# Patient Record
Sex: MALE | Race: WHITE | Employment: OTHER | URBAN - METROPOLITAN AREA
[De-identification: names, ages, dates, MRNs, and addresses within clinical notes are randomized per-mention and may not be internally consistent; named-entity substitution may affect disease eponyms.]

---

## 2020-06-16 ENCOUNTER — HOSPITAL ENCOUNTER (OUTPATIENT)
Age: 70
Discharge: HOME | End: 2020-06-18
Payer: COMMERCIAL

## 2020-06-16 PROCEDURE — U0003 INFECTIOUS AGENT DETECTION BY NUCLEIC ACID (DNA OR RNA); SEVERE ACUTE RESPIRATORY SYNDROME CORONAVIRUS 2 (SARS-COV-2) (CORONAVIRUS DISEASE [COVID-19]), AMPLIFIED PROBE TECHNIQUE, MAKING USE OF HIGH THROUGHPUT TECHNOLOGIES AS DESCRIBED BY CMS-2020-01-R: HCPCS

## 2020-06-18 LAB
SARS-COV-2: NOT DETECTED
SOURCE: NORMAL

## 2020-06-22 ENCOUNTER — ANESTHESIA EVENT (OUTPATIENT)
Dept: OPERATING ROOM | Age: 70
End: 2020-06-22
Payer: MEDICARE

## 2020-06-22 NOTE — ANESTHESIA PRE PROCEDURE
Department of Anesthesiology  Preprocedure Note       Name:  Juan Daniel Saini   Age:  69 y.o.  :  1950                                          MRN:  [id number]         Date:  2020      Surgeon: Terrance Stout):  Modesto Sandoval MD    Procedure: Procedure(s):  RIGHT EYE CATARACT EMULSIFICATION IOL IMPLANT    Medications prior to admission:   Prior to Admission medications    Medication Sig Start Date End Date Taking? Authorizing Provider   losartan (COZAAR) 50 MG tablet Take 50 mg by mouth daily 2PM    Historical Provider, MD       Current medications:    No current facility-administered medications for this encounter. Current Outpatient Medications   Medication Sig Dispense Refill    losartan (COZAAR) 50 MG tablet Take 50 mg by mouth daily 2PM         Allergies:  No Known Allergies    Problem List:  There is no problem list on file for this patient. Past Medical History:        Diagnosis Date    Chronic back pain     swarnoma     Hx of blood clots     after gastric bypass surgery    Hypertension        Past Surgical History:        Procedure Laterality Date    BACK SURGERY      spine-herniated disc    CHOLECYSTECTOMY      COLONOSCOPY      GASTRIC BYPASS SURGERY      HERNIA REPAIR      TONSILLECTOMY      VENA CAVA FILTER PLACEMENT         Social History:    Social History     Tobacco Use    Smoking status: Never Smoker    Smokeless tobacco: Never Used   Substance Use Topics    Alcohol use:  Yes     Alcohol/week: 1.0 standard drinks     Types: 1 Glasses of wine per week     Comment: 1 glass of wine daily                                Counseling given: Not Answered      Vital Signs (Current):   Vitals:    20 0902   Weight: 300 lb (136.1 kg)   Height: 6' 2\" (1.88 m)                                              BP Readings from Last 3 Encounters:   17 (!) 174/96   16 (!) 198/98   16 (!) 177/96       NPO Status:  >8.H

## 2020-06-23 ENCOUNTER — HOSPITAL ENCOUNTER (OUTPATIENT)
Age: 70
Setting detail: OUTPATIENT SURGERY
Discharge: HOME | End: 2020-06-23
Attending: OPHTHALMOLOGY | Admitting: OPHTHALMOLOGY
Payer: MEDICARE

## 2020-06-23 ENCOUNTER — ANESTHESIA (OUTPATIENT)
Dept: OPERATING ROOM | Age: 70
End: 2020-06-23
Payer: MEDICARE

## 2020-06-23 VITALS
HEART RATE: 72 BPM | TEMPERATURE: 97.7 F | HEIGHT: 74 IN | RESPIRATION RATE: 18 BRPM | WEIGHT: 315 LBS | BODY MASS INDEX: 40.43 KG/M2 | DIASTOLIC BLOOD PRESSURE: 81 MMHG | OXYGEN SATURATION: 96 % | SYSTOLIC BLOOD PRESSURE: 152 MMHG

## 2020-06-23 VITALS
TEMPERATURE: 98.6 F | SYSTOLIC BLOOD PRESSURE: 192 MMHG | DIASTOLIC BLOOD PRESSURE: 100 MMHG | OXYGEN SATURATION: 100 % | RESPIRATION RATE: 11 BRPM

## 2020-06-23 PROBLEM — H26.9 RIGHT CATARACT: Status: ACTIVE | Noted: 2020-06-23

## 2020-06-23 PROCEDURE — V2632 POST CHMBR INTRAOCULAR LENS: HCPCS | Performed by: OPHTHALMOLOGY

## 2020-06-23 PROCEDURE — 6370000000 HC RX 637 (ALT 250 FOR IP): Performed by: OPHTHALMOLOGY

## 2020-06-23 PROCEDURE — 2580000003 HC RX 258: Performed by: ANESTHESIOLOGY

## 2020-06-23 PROCEDURE — 2709999900 HC NON-CHARGEABLE SUPPLY: Performed by: OPHTHALMOLOGY

## 2020-06-23 PROCEDURE — 2500000003 HC RX 250 WO HCPCS: Performed by: NURSE ANESTHETIST, CERTIFIED REGISTERED

## 2020-06-23 PROCEDURE — 7100000010 HC PHASE II RECOVERY - FIRST 15 MIN: Performed by: OPHTHALMOLOGY

## 2020-06-23 PROCEDURE — 3700000001 HC ADD 15 MINUTES (ANESTHESIA): Performed by: OPHTHALMOLOGY

## 2020-06-23 PROCEDURE — 3600000013 HC SURGERY LEVEL 3 ADDTL 15MIN: Performed by: OPHTHALMOLOGY

## 2020-06-23 PROCEDURE — 2500000003 HC RX 250 WO HCPCS: Performed by: OPHTHALMOLOGY

## 2020-06-23 PROCEDURE — 3600000003 HC SURGERY LEVEL 3 BASE: Performed by: OPHTHALMOLOGY

## 2020-06-23 PROCEDURE — 7100000011 HC PHASE II RECOVERY - ADDTL 15 MIN: Performed by: OPHTHALMOLOGY

## 2020-06-23 PROCEDURE — 3700000000 HC ANESTHESIA ATTENDED CARE: Performed by: OPHTHALMOLOGY

## 2020-06-23 PROCEDURE — 6360000002 HC RX W HCPCS: Performed by: NURSE ANESTHETIST, CERTIFIED REGISTERED

## 2020-06-23 DEVICE — LENS INTOCU +17.5 DIOPT A CONSTANT 118.8 L13MM DIA6MM 0DEG: Type: IMPLANTABLE DEVICE | Site: EYE | Status: FUNCTIONAL

## 2020-06-23 RX ORDER — LABETALOL HYDROCHLORIDE 5 MG/ML
INJECTION, SOLUTION INTRAVENOUS PRN
Status: DISCONTINUED | OUTPATIENT
Start: 2020-06-23 | End: 2020-06-23 | Stop reason: SDUPTHER

## 2020-06-23 RX ORDER — TETRACAINE HYDROCHLORIDE 5 MG/ML
1 SOLUTION OPHTHALMIC ONCE
Status: COMPLETED | OUTPATIENT
Start: 2020-06-23 | End: 2020-06-23

## 2020-06-23 RX ORDER — HYDROCODONE BITARTRATE AND ACETAMINOPHEN 5; 325 MG/1; MG/1
1 TABLET ORAL PRN
Status: DISCONTINUED | OUTPATIENT
Start: 2020-06-23 | End: 2020-06-23 | Stop reason: HOSPADM

## 2020-06-23 RX ORDER — FENTANYL CITRATE 50 UG/ML
INJECTION, SOLUTION INTRAMUSCULAR; INTRAVENOUS PRN
Status: DISCONTINUED | OUTPATIENT
Start: 2020-06-23 | End: 2020-06-23 | Stop reason: SDUPTHER

## 2020-06-23 RX ORDER — TETRACAINE HYDROCHLORIDE 5 MG/ML
SOLUTION OPHTHALMIC PRN
Status: DISCONTINUED | OUTPATIENT
Start: 2020-06-23 | End: 2020-06-23 | Stop reason: ALTCHOICE

## 2020-06-23 RX ORDER — LABETALOL HYDROCHLORIDE 5 MG/ML
INJECTION, SOLUTION INTRAVENOUS PRN
Status: DISCONTINUED | OUTPATIENT
Start: 2020-06-23 | End: 2020-06-23

## 2020-06-23 RX ORDER — FENTANYL CITRATE 50 UG/ML
50 INJECTION, SOLUTION INTRAMUSCULAR; INTRAVENOUS EVERY 5 MIN PRN
Status: DISCONTINUED | OUTPATIENT
Start: 2020-06-23 | End: 2020-06-23 | Stop reason: HOSPADM

## 2020-06-23 RX ORDER — BALANCED SALT SOLUTION 6.4; .75; .48; .3; 3.9; 1.7 MG/ML; MG/ML; MG/ML; MG/ML; MG/ML; MG/ML
SOLUTION OPHTHALMIC PRN
Status: DISCONTINUED | OUTPATIENT
Start: 2020-06-23 | End: 2020-06-23 | Stop reason: ALTCHOICE

## 2020-06-23 RX ORDER — HYDRALAZINE HYDROCHLORIDE 20 MG/ML
5 INJECTION INTRAMUSCULAR; INTRAVENOUS EVERY 10 MIN PRN
Status: DISCONTINUED | OUTPATIENT
Start: 2020-06-23 | End: 2020-06-23 | Stop reason: HOSPADM

## 2020-06-23 RX ORDER — PHENYLEPHRINE HCL 2.5 %
1 DROPS OPHTHALMIC (EYE)
Status: COMPLETED | OUTPATIENT
Start: 2020-06-23 | End: 2020-06-23

## 2020-06-23 RX ORDER — PHENYLEPHRINE HYDROCHLORIDE 100 MG/ML
1 SOLUTION/ DROPS OPHTHALMIC PRN
Status: DISCONTINUED | OUTPATIENT
Start: 2020-06-23 | End: 2020-06-23 | Stop reason: HOSPADM

## 2020-06-23 RX ORDER — CYCLOPENTOLATE HYDROCHLORIDE 10 MG/ML
1 SOLUTION/ DROPS OPHTHALMIC
Status: COMPLETED | OUTPATIENT
Start: 2020-06-23 | End: 2020-06-23

## 2020-06-23 RX ORDER — DIPHENHYDRAMINE HYDROCHLORIDE 50 MG/ML
12.5 INJECTION INTRAMUSCULAR; INTRAVENOUS
Status: DISCONTINUED | OUTPATIENT
Start: 2020-06-23 | End: 2020-06-23 | Stop reason: HOSPADM

## 2020-06-23 RX ORDER — PROMETHAZINE HYDROCHLORIDE 25 MG/ML
25 INJECTION, SOLUTION INTRAMUSCULAR; INTRAVENOUS
Status: DISCONTINUED | OUTPATIENT
Start: 2020-06-23 | End: 2020-06-23 | Stop reason: HOSPADM

## 2020-06-23 RX ORDER — SODIUM CHLORIDE, SODIUM LACTATE, POTASSIUM CHLORIDE, CALCIUM CHLORIDE 600; 310; 30; 20 MG/100ML; MG/100ML; MG/100ML; MG/100ML
INJECTION, SOLUTION INTRAVENOUS CONTINUOUS
Status: DISCONTINUED | OUTPATIENT
Start: 2020-06-23 | End: 2020-06-23 | Stop reason: HOSPADM

## 2020-06-23 RX ORDER — MIDAZOLAM HYDROCHLORIDE 1 MG/ML
INJECTION INTRAMUSCULAR; INTRAVENOUS PRN
Status: DISCONTINUED | OUTPATIENT
Start: 2020-06-23 | End: 2020-06-23 | Stop reason: SDUPTHER

## 2020-06-23 RX ORDER — HYDROCODONE BITARTRATE AND ACETAMINOPHEN 5; 325 MG/1; MG/1
2 TABLET ORAL PRN
Status: DISCONTINUED | OUTPATIENT
Start: 2020-06-23 | End: 2020-06-23 | Stop reason: HOSPADM

## 2020-06-23 RX ORDER — MEPERIDINE HYDROCHLORIDE 25 MG/ML
12.5 INJECTION INTRAMUSCULAR; INTRAVENOUS; SUBCUTANEOUS EVERY 5 MIN PRN
Status: DISCONTINUED | OUTPATIENT
Start: 2020-06-23 | End: 2020-06-23 | Stop reason: HOSPADM

## 2020-06-23 RX ORDER — MORPHINE SULFATE 2 MG/ML
1 INJECTION, SOLUTION INTRAMUSCULAR; INTRAVENOUS EVERY 5 MIN PRN
Status: DISCONTINUED | OUTPATIENT
Start: 2020-06-23 | End: 2020-06-23 | Stop reason: HOSPADM

## 2020-06-23 RX ORDER — FLURBIPROFEN SODIUM 0.3 MG/ML
1 SOLUTION/ DROPS OPHTHALMIC
Status: COMPLETED | OUTPATIENT
Start: 2020-06-23 | End: 2020-06-23

## 2020-06-23 RX ORDER — LABETALOL HYDROCHLORIDE 5 MG/ML
5 INJECTION, SOLUTION INTRAVENOUS EVERY 10 MIN PRN
Status: DISCONTINUED | OUTPATIENT
Start: 2020-06-23 | End: 2020-06-23 | Stop reason: HOSPADM

## 2020-06-23 RX ADMIN — MIDAZOLAM 2 MG: 1 INJECTION INTRAMUSCULAR; INTRAVENOUS at 06:55

## 2020-06-23 RX ADMIN — LABETALOL HYDROCHLORIDE 10 MG: 5 INJECTION INTRAVENOUS at 07:01

## 2020-06-23 RX ADMIN — FLURBIPROFEN SODIUM 1 DROP: 0.3 SOLUTION/ DROPS OPHTHALMIC at 06:00

## 2020-06-23 RX ADMIN — FENTANYL CITRATE 50 MCG: 50 INJECTION, SOLUTION INTRAMUSCULAR; INTRAVENOUS at 06:55

## 2020-06-23 RX ADMIN — CYCLOPENTOLATE HYDROCHLORIDE 1 DROP: 10 SOLUTION/ DROPS OPHTHALMIC at 06:00

## 2020-06-23 RX ADMIN — FENTANYL CITRATE 50 MCG: 50 INJECTION, SOLUTION INTRAMUSCULAR; INTRAVENOUS at 06:56

## 2020-06-23 RX ADMIN — FLURBIPROFEN SODIUM 1 DROP: 0.3 SOLUTION/ DROPS OPHTHALMIC at 05:55

## 2020-06-23 RX ADMIN — LABETALOL HYDROCHLORIDE 10 MG: 5 INJECTION INTRAVENOUS at 06:57

## 2020-06-23 RX ADMIN — FLURBIPROFEN SODIUM 1 DROP: 0.3 SOLUTION/ DROPS OPHTHALMIC at 06:05

## 2020-06-23 RX ADMIN — TETRACAINE HYDROCHLORIDE 1 DROP: 25 LIQUID OPHTHALMIC at 06:21

## 2020-06-23 RX ADMIN — PHENYLEPHRINE HYDROCHLORIDE 1 DROP: 25 SOLUTION/ DROPS OPHTHALMIC at 06:05

## 2020-06-23 RX ADMIN — CYCLOPENTOLATE HYDROCHLORIDE 1 DROP: 10 SOLUTION/ DROPS OPHTHALMIC at 06:05

## 2020-06-23 RX ADMIN — CYCLOPENTOLATE HYDROCHLORIDE 1 DROP: 10 SOLUTION/ DROPS OPHTHALMIC at 05:55

## 2020-06-23 RX ADMIN — PHENYLEPHRINE HYDROCHLORIDE 1 DROP: 25 SOLUTION/ DROPS OPHTHALMIC at 06:00

## 2020-06-23 RX ADMIN — PHENYLEPHRINE HYDROCHLORIDE 1 DROP: 25 SOLUTION/ DROPS OPHTHALMIC at 05:55

## 2020-06-23 RX ADMIN — SODIUM CHLORIDE, POTASSIUM CHLORIDE, SODIUM LACTATE AND CALCIUM CHLORIDE: 600; 310; 30; 20 INJECTION, SOLUTION INTRAVENOUS at 06:18

## 2020-06-23 ASSESSMENT — PULMONARY FUNCTION TESTS
PIF_VALUE: 0

## 2020-06-23 ASSESSMENT — PAIN SCALES - GENERAL
PAINLEVEL_OUTOF10: 0
PAINLEVEL_OUTOF10: 0

## 2020-06-23 ASSESSMENT — LIFESTYLE VARIABLES: SMOKING_STATUS: 0

## 2020-06-23 NOTE — OP NOTE
instilled into the capsular bag and dialed to 3 and 9 o'clock positions. Healon was removed from in front of and behind the lens. The lens was found to be well centered, well positioned in the bag and stable. The wound was checked and found to be airtight and watertight. The lid speculum was removed and the drape was removed. The patient was brought to the recovery room in excellent condition, given postoperative instructions, and discharge in excellent condition. Operative Note      Patient: Frantz Bar  YOB: 1950  MRN: [id number]    Date of Procedure: 6/23/2020    Pre-Op Diagnosis: RIGHT EYE CATARACT    Post-Op Diagnosis: Same       Procedure(s):  RIGHT EYE CATARACT EMULSIFICATION IOL IMPLANT    Surgeon(s):  Slime Melton MD    Assistant:   * No surgical staff found *    Anesthesia: Monitor Anesthesia Care    Estimated Blood Loss (mL): Minimal    Complications: None    Specimens:   * No specimens in log *    Implants:  Implant Name Type Inv.  Item Serial No.  Lot No. LRB No. Used Action   LENS IOL TECNIS W/PRELOADED DEL SYS 17.5D - [id number] Eye LENS IOL TECNIS W/PRELOADED DEL SYS 17.5D [id number] [employer]  Right 1 Implanted         Drains: * No LDAs found *    Findings: Right cataract    Detailed Description of Procedure:   Right cataract extraction with intra ocular lens implant    Electronically signed by Rasheed Bills MD on 6/23/2020 at 7:02 AM

## 2020-09-24 ENCOUNTER — APPOINTMENT (OUTPATIENT)
Dept: GENERAL RADIOLOGY | Age: 70
End: 2020-09-24
Payer: MEDICARE

## 2020-09-24 ENCOUNTER — HOSPITAL ENCOUNTER (EMERGENCY)
Age: 70
Discharge: HOME | End: 2020-09-24
Payer: MEDICARE

## 2020-09-24 VITALS
TEMPERATURE: 98.3 F | SYSTOLIC BLOOD PRESSURE: 196 MMHG | WEIGHT: 310 LBS | HEART RATE: 83 BPM | BODY MASS INDEX: 39.8 KG/M2 | OXYGEN SATURATION: 98 % | RESPIRATION RATE: 16 BRPM | DIASTOLIC BLOOD PRESSURE: 97 MMHG

## 2020-09-24 PROCEDURE — 99212 OFFICE O/P EST SF 10 MIN: CPT

## 2020-09-24 PROCEDURE — 73030 X-RAY EXAM OF SHOULDER: CPT

## 2020-09-24 NOTE — ED PROVIDER NOTES
This is a 67-year-old male that presents to urgent care complaining of right anterior shoulder pain for the past 2 days patient states he got off of his tractor and fell on his right shoulder area. He denies head neck back chest or other extremity pain no numbness or tingling. Denies any significant injuries to the right shoulder in the past does complain of decreased range of motion of the right shoulder denies numbness or tingling. On first contact patient he appears to be in no acute distress. Review of Systems   Constitutional:        Pertinent positives and negatives are stated within HPI, all other systems reviewed and are negative. Physical Exam  Vitals signs and nursing note reviewed. Constitutional:       Appearance: He is well-developed. HENT:      Head: Normocephalic and atraumatic. Jaw: No trismus. Right Ear: Hearing, tympanic membrane, ear canal and external ear normal.      Left Ear: Hearing, tympanic membrane, ear canal and external ear normal.      Nose: Nose normal.      Right Sinus: No maxillary sinus tenderness or frontal sinus tenderness. Left Sinus: No maxillary sinus tenderness or frontal sinus tenderness. Mouth/Throat:      Pharynx: Uvula midline. No uvula swelling. Eyes:      General: Lids are normal.      Conjunctiva/sclera: Conjunctivae normal.      Pupils: Pupils are equal, round, and reactive to light. Neck:      Musculoskeletal: Normal range of motion and neck supple. Cardiovascular:      Rate and Rhythm: Normal rate and regular rhythm. Heart sounds: Normal heart sounds. No murmur. Pulmonary:      Effort: Pulmonary effort is normal.      Breath sounds: Normal breath sounds. Abdominal:      General: Bowel sounds are normal.      Palpations: Abdomen is soft. Abdomen is not rigid. Tenderness: There is no abdominal tenderness. There is no guarding or rebound. Musculoskeletal:      Comments: Head and neck are atraumatic.   Does have some right anterior shoulder tenderness with palpation no deformity noted. No collarbone pain no scapular pain no pain in the right elbow or upper arm. Has pain with range of motion to the right anterior shoulder. No cyanosis or open area. Has palpable radial pulse. Skin:     General: Skin is warm and dry. Findings: No abrasion or rash. Neurological:      Mental Status: He is alert and oriented to person, place, and time. GCS: GCS eye subscore is 4. GCS verbal subscore is 5. GCS motor subscore is 6. Cranial Nerves: No cranial nerve deficit. Sensory: No sensory deficit. Coordination: Coordination normal.      Gait: Gait normal.         Procedures    MDM  Number of Diagnoses or Management Options  Osteoarthritis of left shoulder, unspecified osteoarthritis type:   Sprain of right shoulder, unspecified shoulder sprain type, initial encounter:   Diagnosis management comments: Xray neg for acute  Take ibuprofen  Has sling  Use topical medications  rest      --------------------------------------------- PAST HISTORY ---------------------------------------------  Past Medical History:  has a past medical history of Chronic back pain, Hx of blood clots, and Hypertension. Past Surgical History:  has a past surgical history that includes Tonsillectomy; hernia repair; Gastric bypass surgery (2003); back surgery (2008); Vena Cava Filter Placement (2002); Colonoscopy; Cholecystectomy; Cataract removal with implant (Right, 06/23/2020); and Intracapsular cataract extraction (Right, 6/23/2020). Social History:  reports that he has never smoked. He has never used smokeless tobacco. He reports current alcohol use of about 1.0 standard drinks of alcohol per week. He reports that he does not use drugs. Family History: family history is not on file. The patients home medications have been reviewed.     Allergies: Patient has no known allergies. -------------------------------------------------- RESULTS -------------------------------------------------  No results found for this visit on 09/24/20. XR SHOULDER RIGHT (MIN 2 VIEWS)   Final Result   Moderate to advanced degenerative changes of the right acromioclavicular   joint. No acute osseous abnormality.             ------------------------- NURSING NOTES AND VITALS REVIEWED ---------------------------   The nursing notes within the ED encounter and vital signs as below have been reviewed. BP (!) 196/97   Pulse 83   Temp 98.3 °F (36.8 °C)   Resp 16   Wt (!) 310 lb (140.6 kg)   SpO2 98%   BMI 39.80 kg/m²   Oxygen Saturation Interpretation: Normal      ------------------------------------------ PROGRESS NOTES ------------------------------------------   I have spoken with the patient and discussed todays results, in addition to providing specific details for the plan of care and counseling regarding the diagnosis and prognosis. Their questions are answered at this time and they are agreeable with the plan.      --------------------------------- ADDITIONAL PROVIDER NOTES ---------------------------------     This patient is stable for discharge. I have shared the specific conditions for return, as well as the importance of follow-up. * NOTE: This report was transcribed using voice recognition software. Every effort was made to ensure accuracy; however, inadvertent computerized transcription errors may be present.    --------------------------------- IMPRESSION AND DISPOSITION ---------------------------------    IMPRESSION  1. Sprain of right shoulder, unspecified shoulder sprain type, initial encounter    2.  Osteoarthritis of left shoulder, unspecified osteoarthritis type        DISPOSITION  Disposition: Discharge to home  Patient condition is good            Wing Abad PA-C  09/24/20 0538

## 2021-03-04 ENCOUNTER — HOSPITAL ENCOUNTER (OUTPATIENT)
Age: 71
Discharge: HOME | End: 2021-03-06
Payer: MEDICARE

## 2021-03-04 DIAGNOSIS — H26.9 CATARACT OF LEFT EYE, UNSPECIFIED CATARACT TYPE: ICD-10-CM

## 2021-03-04 PROCEDURE — U0003 INFECTIOUS AGENT DETECTION BY NUCLEIC ACID (DNA OR RNA); SEVERE ACUTE RESPIRATORY SYNDROME CORONAVIRUS 2 (SARS-COV-2) (CORONAVIRUS DISEASE [COVID-19]), AMPLIFIED PROBE TECHNIQUE, MAKING USE OF HIGH THROUGHPUT TECHNOLOGIES AS DESCRIBED BY CMS-2020-01-R: HCPCS

## 2021-03-05 LAB
SARS-COV-2: NOT DETECTED
SOURCE: NORMAL

## 2021-03-07 NOTE — H&P
History and Physical    Patient's Name/Date of Birth: Ector Frances / 1950 (89 y.o.)    Date: March 7, 2021     Chief Complaint: Decreased vision of the left eye    HPI: Mature left cataract with decreased vision . Risks and complications as well as options and benefits were discussed with the patient and he has elected to proceed with left cataract extraction with intra ocular lens implant. Past Medical History:   Diagnosis Date    Chronic back pain 2008    swarnoma     Hx of blood clots 2003    after gastric bypass surgery    Hypertension        Past Surgical History:   Procedure Laterality Date    BACK SURGERY  2008    spine-herniated disc    CHOLECYSTECTOMY      COLONOSCOPY      GASTRIC BYPASS SURGERY  2003    HERNIA REPAIR      INTRACAPSULAR CATARACT EXTRACTION Right 06/23/2020    RIGHT EYE CATARACT EMULSIFICATION IOL IMPLANT performed by Suzanna Mcmahon MD at [address] [place] [address], [place] [address]  2002       Prior to Admission medications    Medication Sig Start Date End Date Taking? Authorizing Provider   losartan (COZAAR) 50 MG tablet Take 50 mg by mouth daily 2PM    Historical Provider, MD       Patient has no known allergies. History reviewed. No pertinent family history. Social History     Socioeconomic History    Marital status:      Spouse name: Not on file    Number of children: Not on file    Years of education: Not on file    Highest education level: Not on file   Occupational History    Not on file   Social Needs    Financial resource strain: Not on file    Food insecurity     Worry: Not on file     Inability: Not on file    Transportation needs     Medical: Not on file     Non-medical: Not on file   Tobacco Use    Smoking status: Never Smoker    Smokeless tobacco: Never Used   Substance and Sexual Activity    Alcohol use:  Yes     Alcohol/week: 1.0 standard drinks     Types: 1 Glasses of wine per week     Comment: 1 glass of wine daily    Drug use: No    Sexual activity: Not on file   Lifestyle    Physical activity     Days per week: Not on file     Minutes per session: Not on file    Stress: Not on file   Relationships    Social connections     Talks on phone: Not on file     Gets together: Not on file     Attends Mandaen service: Not on file     Active member of club or organization: Not on file     Attends meetings of clubs or organizations: Not on file     Relationship status: Not on file    Intimate partner violence     Fear of current or ex partner: Not on file     Emotionally abused: Not on file     Physically abused: Not on file     Forced sexual activity: Not on file   Other Topics Concern    Not on file   Social History Narrative    Not on file       Review of Systems:   CONSTITUTIONAL: Oriented to person, place and time   EYES: Mature left cataract with decreased vision effecting reading, driving and all routine home activities. Visual acuity is 20/80  HEENT:  negative  RESPIRATORY:  negative  CARDIOVASCULAR:  negative  GASTROINTESTINAL:  negative  GENITOURINARY:  negative  INTEGUMENT/BREAST:  negative  HEMATOLOGIC/LYMPHATIC:  negative  ALLERGIC/IMMUNOLOGIC:  negative  ENDOCRINE:  negative  MUSCULOSKELETAL:  negative  NEUROLOGICAL:  negative  BEHAVIOR/PSYCH:  negative    Physical Exam:  Vitals:    03/03/21 1454   Weight: (!) 310 lb (140.6 kg)   Height: 6' 2\" (1.88 m)       CONSTITUTIONAL:  awake, alert, cooperative, no apparent distress, and appears stated age  EYES:  Lids and lashes normal, pupils equal, round and reactive to light, extra ocular muscles intact, sclera clear, conjunctiva normal  ENT:  Normocephalic, without obvious abnormality, atraumatic, sinuses nontender on palpation, external ears without lesions, oral pharynx with moist mucus membranes, tonsils without erythema or exudates, gums normal and good dentition.   NECK:  Supple, symmetrical, trachea midline, no adenopathy, thyroid symmetric, not enlarged and no tenderness, skin normal  HEMATOLOGIC/LYMPHATICS:  no cervical lymphadenopathy and no supraclavicular lymphadenopathy  BACK:  Symmetric, no curvature, spinous processes are non-tender on palpation, paraspinous muscles are non-tender on palpation, no costal vertebral tenderness  LUNGS:  No increased work of breathing, good air exchange, clear to auscultation bilaterally, no crackles or wheezing  CARDIOVASCULAR:  Normal apical impulse, regular rate and rhythm, normal S1 and S2, no S3 or S4, and no murmur noted  ABDOMEN:  No scars, normal bowel sounds, soft, non-distended, non-tender, no masses palpated, no hepatosplenomegally  CHEST/BREASTS:  Breasts symmetrical, skin without lesion(s), no nipple retraction or dimpling, no nipple discharge, no masses palpated, no axillary or supraclavicular adenopathy  GENITAL/URINARY: Deferred   MUSCULOSKELETAL:  There is no redness, warmth, or swelling of the joints. Full range of motion noted. Motor strength is 5 out of 5 all extremities bilaterally. Tone is normal.  NEUROLOGIC:  Awake, alert, oriented to name, place and time. Cranial nerves II-XII are grossly intact. Motor is 5 out of 5 bilaterally. Cerebellar finger to nose, heel to shin intact. Sensory is intact. Babinski down going, Romberg negative, and gait is normal.  SKIN:  no bruising or bleeding, normal skin color, texture, turgor and no redness, warmth, or swelling    Assessment:  Active Problems:    * No active hospital problems. *  Resolved Problems:    * No resolved hospital problems. *      Plan:  1.  Left cataract extraction with intra ocular lens implant    Electronically signed by Ron Moreno MD on 3/7/21 at 4:02 PM EST

## 2021-03-08 ENCOUNTER — ANESTHESIA EVENT (OUTPATIENT)
Dept: OPERATING ROOM | Age: 71
End: 2021-03-08
Payer: MEDICARE

## 2021-03-08 NOTE — ANESTHESIA PRE PROCEDURE
Department of Anesthesiology  Preprocedure Note       Name:  Demarco Campbell   Age:  69 y.o.  :  1950                                          MRN:  [id number]         Date:  3/8/2021      Surgeon: Pretty Ansari):  Francesco Mayes MD    Procedure: Procedure(s):  LEFT CATARACT EXTRACTION WITH IOL    Medications prior to admission:   Prior to Admission medications    Medication Sig Start Date End Date Taking? Authorizing Provider   losartan (COZAAR) 50 MG tablet Take 50 mg by mouth daily 2PM    Historical Provider, MD       Current medications:    No current facility-administered medications for this encounter. Current Outpatient Medications   Medication Sig Dispense Refill    losartan (COZAAR) 50 MG tablet Take 50 mg by mouth daily 2PM         Allergies:  No Known Allergies    Problem List:    Patient Active Problem List   Diagnosis Code    Right cataract H26.9       Past Medical History:        Diagnosis Date    Chronic back pain     swarnoma     Hx of blood clots 2003    after gastric bypass surgery    Hypertension        Past Surgical History:        Procedure Laterality Date    BACK SURGERY      spine-herniated disc    CHOLECYSTECTOMY      COLONOSCOPY      GASTRIC BYPASS SURGERY      HERNIA REPAIR      INTRACAPSULAR CATARACT EXTRACTION Right 2020    RIGHT EYE CATARACT EMULSIFICATION IOL IMPLANT performed by Francesco Mayes MD at [address] [place] [address]         Social History:    Social History     Tobacco Use    Smoking status: Never Smoker    Smokeless tobacco: Never Used   Substance Use Topics    Alcohol use:  Yes     Alcohol/week: 1.0 standard drinks     Types: 1 Glasses of wine per week     Comment: 1 glass of wine daily                                Counseling given: Not Answered      Vital Signs (Current):   Vitals:    21 1454   Weight: (!) 310 lb (140.6 kg)   Height: 6' 2\" (1.88 m) BP Readings from Last 3 Encounters:   09/24/20 (!) 196/97   06/23/20 (!) 192/100   06/23/20 (!) 152/81       NPO Status:                                                                                 BMI:   Wt Readings from Last 3 Encounters:   09/24/20 (!) 310 lb (140.6 kg)   06/23/20 (!) 334 lb (151.5 kg)   05/11/16 (!) 305 lb (138.3 kg)     Body mass index is 39.8 kg/m². CBC:   Lab Results   Component Value Date    WBC 4.8 05/27/2016    RBC 4.09 05/27/2016    HGB 10.3 05/27/2016    HCT 33.0 05/27/2016    MCV 80.6 05/27/2016    RDW 17.8 05/27/2016     05/27/2016       CMP:   Lab Results   Component Value Date     05/27/2016    K 5.1 05/27/2016     05/27/2016    CO2 24 05/27/2016    BUN 16 03/09/2017    CREATININE 1.1 03/09/2017    GFRAA >60 03/09/2017    LABGLOM >60 03/09/2017    GLUCOSE 101 05/27/2016    PROT 7.1 05/27/2016    CALCIUM 8.9 05/27/2016    BILITOT 0.3 05/27/2016    ALKPHOS 80 05/27/2016    AST 25 05/27/2016    ALT 18 05/27/2016       POC Tests: No results for input(s): POCGLU, POCNA, POCK, POCCL, POCBUN, POCHEMO, POCHCT in the last 72 hours. Coags: No results found for: PROTIME, INR, APTT    HCG (If Applicable): No results found for: PREGTESTUR, PREGSERUM, HCG, HCGQUANT     ABGs: No results found for: PHART, PO2ART, YDR8IVQ, CAR0CPQ, BEART, H3ECZENG     Type & Screen (If Applicable):  No results found for: LABABO, LABRH    Drug/Infectious Status (If Applicable):  No results found for: HIV, HEPCAB    COVID-19 Screening (If Applicable):   Lab Results   Component Value Date    COVID19 Not Detected 03/04/2021         Anesthesia Evaluation  Patient summary reviewed and Nursing notes reviewed no history of anesthetic complications:   Airway: Mallampati: III        Dental:      Comment: Edent on top.   Missing several on bottom    Pulmonary:Negative Pulmonary ROS breath sounds clear to auscultation                             Cardiovascular:    (+) hypertension:, hyperlipidemia        Rhythm: regular  Rate: normal                    Neuro/Psych:   Negative Neuro/Psych ROS              GI/Hepatic/Renal: Neg GI/Hepatic/Renal ROS  (+) morbid obesity         ROS comment: S/P Gastric bypass      . Endo/Other: Negative Endo/Other ROS   (+) : arthritis (DDD with chronic back pain.): OA., .                 Abdominal:         Obesity:  Morbidly obese. Vascular: negative vascular ROS.  + DVT ( H/O blood clots following Gastric bypass surgery, underwent vena caval filter placement.,), . Anesthesia Plan      MAC     ASA 3       Induction: intravenous. Anesthetic plan and risks discussed with patient. Plan discussed with CRNA. PAT Chart Review:  Chart reviewed per routine on March 8, 2021 at 1:22 PM by Freedom Howell MD.  (Final assessment and plan per day of surgery team.)      Freedom Howell MD   3/8/2021    Patient seen and examined, chart reviewed, agree with above findings. Anesthetic plan, risks, benefits, alternatives, and personnel involved discussed with patient. Patient verbalized an understanding and agreed to proceed. NPO status confirmed. Anesthetic plan discussed with care team members and agreed upon.     Pancho Lucio DO   3/9/2021  7:15 AM

## 2021-03-09 ENCOUNTER — ANESTHESIA (OUTPATIENT)
Dept: OPERATING ROOM | Age: 71
End: 2021-03-09
Payer: MEDICARE

## 2021-03-09 ENCOUNTER — HOSPITAL ENCOUNTER (OUTPATIENT)
Age: 71
Setting detail: OUTPATIENT SURGERY
Discharge: HOME | End: 2021-03-09
Attending: OPHTHALMOLOGY | Admitting: OPHTHALMOLOGY
Payer: MEDICARE

## 2021-03-09 VITALS
TEMPERATURE: 96.8 F | WEIGHT: 315 LBS | HEIGHT: 74 IN | OXYGEN SATURATION: 95 % | DIASTOLIC BLOOD PRESSURE: 74 MMHG | RESPIRATION RATE: 16 BRPM | BODY MASS INDEX: 40.43 KG/M2 | HEART RATE: 71 BPM | SYSTOLIC BLOOD PRESSURE: 197 MMHG

## 2021-03-09 VITALS
DIASTOLIC BLOOD PRESSURE: 74 MMHG | OXYGEN SATURATION: 99 % | RESPIRATION RATE: 20 BRPM | SYSTOLIC BLOOD PRESSURE: 166 MMHG

## 2021-03-09 DIAGNOSIS — H26.9 CATARACT OF LEFT EYE, UNSPECIFIED CATARACT TYPE: Primary | ICD-10-CM

## 2021-03-09 PROCEDURE — 2580000003 HC RX 258: Performed by: ANESTHESIOLOGY

## 2021-03-09 PROCEDURE — 6360000002 HC RX W HCPCS: Performed by: NURSE ANESTHETIST, CERTIFIED REGISTERED

## 2021-03-09 PROCEDURE — 3700000000 HC ANESTHESIA ATTENDED CARE: Performed by: OPHTHALMOLOGY

## 2021-03-09 PROCEDURE — 6370000000 HC RX 637 (ALT 250 FOR IP): Performed by: OPHTHALMOLOGY

## 2021-03-09 PROCEDURE — 7100000011 HC PHASE II RECOVERY - ADDTL 15 MIN: Performed by: OPHTHALMOLOGY

## 2021-03-09 PROCEDURE — 7100000010 HC PHASE II RECOVERY - FIRST 15 MIN: Performed by: OPHTHALMOLOGY

## 2021-03-09 PROCEDURE — 3600000003 HC SURGERY LEVEL 3 BASE: Performed by: OPHTHALMOLOGY

## 2021-03-09 PROCEDURE — 2709999900 HC NON-CHARGEABLE SUPPLY: Performed by: OPHTHALMOLOGY

## 2021-03-09 PROCEDURE — V2632 POST CHMBR INTRAOCULAR LENS: HCPCS | Performed by: OPHTHALMOLOGY

## 2021-03-09 PROCEDURE — 2500000003 HC RX 250 WO HCPCS: Performed by: NURSE ANESTHETIST, CERTIFIED REGISTERED

## 2021-03-09 PROCEDURE — 2500000003 HC RX 250 WO HCPCS: Performed by: OPHTHALMOLOGY

## 2021-03-09 DEVICE — LENS INTOCU +17.5 DIOPT A CONSTANT 118.8 L13MM DIA6MM 0DEG: Type: IMPLANTABLE DEVICE | Site: EYE | Status: FUNCTIONAL

## 2021-03-09 RX ORDER — TETRACAINE HYDROCHLORIDE 5 MG/ML
1 SOLUTION OPHTHALMIC ONCE
Status: COMPLETED | OUTPATIENT
Start: 2021-03-09 | End: 2021-03-09

## 2021-03-09 RX ORDER — PHENYLEPHRINE HYDROCHLORIDE 100 MG/ML
1 SOLUTION/ DROPS OPHTHALMIC PRN
Status: DISCONTINUED | OUTPATIENT
Start: 2021-03-09 | End: 2021-03-09 | Stop reason: HOSPADM

## 2021-03-09 RX ORDER — CYCLOPENTOLATE HYDROCHLORIDE 10 MG/ML
1 SOLUTION/ DROPS OPHTHALMIC
Status: COMPLETED | OUTPATIENT
Start: 2021-03-09 | End: 2021-03-09

## 2021-03-09 RX ORDER — FLURBIPROFEN SODIUM 0.3 MG/ML
1 SOLUTION/ DROPS OPHTHALMIC
Status: COMPLETED | OUTPATIENT
Start: 2021-03-09 | End: 2021-03-09

## 2021-03-09 RX ORDER — SODIUM CHLORIDE, SODIUM LACTATE, POTASSIUM CHLORIDE, CALCIUM CHLORIDE 600; 310; 30; 20 MG/100ML; MG/100ML; MG/100ML; MG/100ML
INJECTION, SOLUTION INTRAVENOUS CONTINUOUS
Status: DISCONTINUED | OUTPATIENT
Start: 2021-03-09 | End: 2021-03-09 | Stop reason: HOSPADM

## 2021-03-09 RX ORDER — ALFENTANIL HYDROCHLORIDE 500 UG/ML
INJECTION INTRAVENOUS PRN
Status: DISCONTINUED | OUTPATIENT
Start: 2021-03-09 | End: 2021-03-09 | Stop reason: SDUPTHER

## 2021-03-09 RX ORDER — BALANCED SALT SOLUTION 6.4; .75; .48; .3; 3.9; 1.7 MG/ML; MG/ML; MG/ML; MG/ML; MG/ML; MG/ML
SOLUTION OPHTHALMIC PRN
Status: DISCONTINUED | OUTPATIENT
Start: 2021-03-09 | End: 2021-03-09 | Stop reason: ALTCHOICE

## 2021-03-09 RX ORDER — PHENYLEPHRINE HCL 2.5 %
1 DROPS OPHTHALMIC (EYE)
Status: COMPLETED | OUTPATIENT
Start: 2021-03-09 | End: 2021-03-09

## 2021-03-09 RX ORDER — TETRACAINE HYDROCHLORIDE 5 MG/ML
SOLUTION OPHTHALMIC PRN
Status: DISCONTINUED | OUTPATIENT
Start: 2021-03-09 | End: 2021-03-09 | Stop reason: ALTCHOICE

## 2021-03-09 RX ORDER — MIDAZOLAM HYDROCHLORIDE 1 MG/ML
INJECTION INTRAMUSCULAR; INTRAVENOUS PRN
Status: DISCONTINUED | OUTPATIENT
Start: 2021-03-09 | End: 2021-03-09 | Stop reason: SDUPTHER

## 2021-03-09 RX ADMIN — PHENYLEPHRINE HYDROCHLORIDE 1 DROP: 25 SOLUTION/ DROPS OPHTHALMIC at 06:25

## 2021-03-09 RX ADMIN — FLURBIPROFEN SODIUM 1 DROP: 0.3 SOLUTION/ DROPS OPHTHALMIC at 06:15

## 2021-03-09 RX ADMIN — CYCLOPENTOLATE HYDROCHLORIDE 1 DROP: 10 SOLUTION/ DROPS OPHTHALMIC at 06:25

## 2021-03-09 RX ADMIN — PHENYLEPHRINE HYDROCHLORIDE 1 DROP: 25 SOLUTION/ DROPS OPHTHALMIC at 06:20

## 2021-03-09 RX ADMIN — CYCLOPENTOLATE HYDROCHLORIDE 1 DROP: 10 SOLUTION/ DROPS OPHTHALMIC at 06:20

## 2021-03-09 RX ADMIN — FLURBIPROFEN SODIUM 1 DROP: 0.3 SOLUTION/ DROPS OPHTHALMIC at 06:20

## 2021-03-09 RX ADMIN — MIDAZOLAM 2 MG: 1 INJECTION INTRAMUSCULAR; INTRAVENOUS at 07:39

## 2021-03-09 RX ADMIN — CYCLOPENTOLATE HYDROCHLORIDE 1 DROP: 10 SOLUTION/ DROPS OPHTHALMIC at 06:15

## 2021-03-09 RX ADMIN — SODIUM CHLORIDE, POTASSIUM CHLORIDE, SODIUM LACTATE AND CALCIUM CHLORIDE: 600; 310; 30; 20 INJECTION, SOLUTION INTRAVENOUS at 06:52

## 2021-03-09 RX ADMIN — ALFENTANIL HYDROCHLORIDE 250 MCG: 500 INJECTION INTRAVENOUS at 07:41

## 2021-03-09 RX ADMIN — FLURBIPROFEN SODIUM 1 DROP: 0.3 SOLUTION/ DROPS OPHTHALMIC at 06:25

## 2021-03-09 RX ADMIN — PHENYLEPHRINE HYDROCHLORIDE 1 DROP: 25 SOLUTION/ DROPS OPHTHALMIC at 06:15

## 2021-03-09 RX ADMIN — TETRACAINE HYDROCHLORIDE 1 DROP: 25 LIQUID OPHTHALMIC at 07:02

## 2021-03-09 RX ADMIN — ALFENTANIL HYDROCHLORIDE 250 MCG: 500 INJECTION INTRAVENOUS at 07:46

## 2021-03-09 RX ADMIN — ALFENTANIL HYDROCHLORIDE 250 MCG: 500 INJECTION INTRAVENOUS at 07:39

## 2021-03-09 ASSESSMENT — PAIN SCALES - GENERAL
PAINLEVEL_OUTOF10: 0
PAINLEVEL_OUTOF10: 0

## 2021-03-09 NOTE — ANESTHESIA POSTPROCEDURE EVALUATION
Department of Anesthesiology  Postprocedure Note    Patient: Stephy Lomeli  MRN: [id number]  YOB: 1950  Date of evaluation: 3/9/2021  Time:  12:39 PM     Procedure Summary     Date: 03/09/21 Room / Location: [address] [place] [address]    Anesthesia Start: 1738 Anesthesia Stop: 5000    Procedure: LEFT CATARACT EXTRACTION WITH IOL (Left Eye) Diagnosis: (LEFT CATARACT)    Surgeons: Shabbir Esparza MD Responsible Provider: Marko Schneider DO    Anesthesia Type: MAC ASA Status: 3          Anesthesia Type: MAC    Glendy Phase I: Glendy Score: 10    Glendy Phase II: Glendy Score: 10    Last vitals: Reviewed and per EMR flowsheets.        Anesthesia Post Evaluation    Patient location during evaluation: PACU  Patient participation: complete - patient participated  Level of consciousness: awake and alert  Pain score: 1  Airway patency: patent  Nausea & Vomiting: no nausea and no vomiting  Complications: no  Cardiovascular status: hemodynamically stable  Respiratory status: acceptable  Hydration status: euvolemic

## 2021-03-09 NOTE — OP NOTE
PREOPERATIVE DIAGNOSIS:  Left Cataract    POSTOPERATIVE DIAGNOSIS: Left Cataract    PROCEDURE:  Left phacoemulsification with intraocular lens implant. ANESTHESIA:  Local Mac    ESTIMATED BLOOD LOSS:  Minimal    COMPLICATIONS:  None    DESCRIPTION OF PROCEDURE:  The patient was brought into the operating room. The operative eye was marked, which was the left eye. The left eye was then prepped with a full-strength Betadine preparation. The periorbital area was copiously washed with Betadine. The lashes were washed with Betadine. Dilute Betadine was then also put in the inferior and superior fornices and left in place for approximately a minute or 2. This was then irrigated with sterile water. The face was then wiped and the preparation was repeated 1 more time. The drape was then placed over the operative eye with the sticky adhesive placed at the lid margins so that it draped the lashes out of the operative field superiorly and inferiorly, as well as isolated the meibomian glands behind the drape. This cleared the operative field of any meibomian gland secretions and eyelashes. Next, a lid speculum was positioned in the left eye. A super sharp blade was used to make a side-port incision at the 2 o'clock position. A clear corneal incision was fashioned over the 12 o;clock meridian with a 2.3mm keratome at the limbus. Healon was used to reform the anterior chamber. A continuous tear anterior capsulotomy was then performed with a bent needle cystotome. Hydrodissection was performed by irrigating with balanced salt solution through a syringe underneath the anterior capsular flap to loosen and allow free rotation of the lens nucleus. Phacoemulsification was used and the entire lens nucleus was removed. The I A unit was instilled in the eye, and the remaining cortex was removed. Healon was used to separate the anterior and posterior capsular flaps.   The PCBOO 17.5 diopter lens was then instilled

## 2021-03-09 NOTE — H&P
Update History & Physical    The patient's History and Physical of 3 / 7 / 2021 was reviewed with the patient and there were no significant changes. I examined the patient and there were no significant changes from the previous History and Physical.    Plan: The risk, benefits, expected outcome, and alternative to the recommended procedure have been discussed with the patient. Patient understands and wants to proceed with the procedure.     Electronically signed by Charleen Cortes MD on 3/9/21 at 7:46 AM EST

## 2021-11-08 ENCOUNTER — TELEPHONE (OUTPATIENT)
Dept: PRIMARY CARE CLINIC | Age: 71
End: 2021-11-08

## 2021-11-09 RX ORDER — LOSARTAN POTASSIUM AND HYDROCHLOROTHIAZIDE 25; 100 MG/1; MG/1
1 TABLET ORAL DAILY
Qty: 90 TABLET | Refills: 0 | Status: SHIPPED
Start: 2021-11-09 | End: 2022-02-08 | Stop reason: SDUPTHER

## 2022-01-25 ENCOUNTER — OFFICE VISIT (OUTPATIENT)
Dept: PRIMARY CARE CLINIC | Age: 72
End: 2022-01-25
Payer: MEDICARE

## 2022-01-25 VITALS
SYSTOLIC BLOOD PRESSURE: 144 MMHG | DIASTOLIC BLOOD PRESSURE: 80 MMHG | TEMPERATURE: 97.4 F | BODY MASS INDEX: 39.4 KG/M2 | HEART RATE: 84 BPM | WEIGHT: 307 LBS | OXYGEN SATURATION: 98 % | HEIGHT: 74 IN

## 2022-01-25 DIAGNOSIS — I10 PRIMARY HYPERTENSION: Primary | ICD-10-CM

## 2022-01-25 DIAGNOSIS — Z12.5 PROSTATE CANCER SCREENING: ICD-10-CM

## 2022-01-25 DIAGNOSIS — D50.9 IRON DEFICIENCY ANEMIA, UNSPECIFIED IRON DEFICIENCY ANEMIA TYPE: ICD-10-CM

## 2022-01-25 DIAGNOSIS — I10 PRIMARY HYPERTENSION: ICD-10-CM

## 2022-01-25 DIAGNOSIS — D64.9 ANEMIA, UNSPECIFIED TYPE: ICD-10-CM

## 2022-01-25 LAB
ALBUMIN SERPL-MCNC: 3.8 G/DL (ref 3.5–5.2)
ALP BLD-CCNC: 111 U/L (ref 40–129)
ALT SERPL-CCNC: 13 U/L (ref 0–40)
ANION GAP SERPL CALCULATED.3IONS-SCNC: 15 MMOL/L (ref 7–16)
AST SERPL-CCNC: 23 U/L (ref 0–39)
BILIRUB SERPL-MCNC: 0.3 MG/DL (ref 0–1.2)
BUN BLDV-MCNC: 23 MG/DL (ref 6–23)
CALCIUM SERPL-MCNC: 8.6 MG/DL (ref 8.6–10.2)
CHLORIDE BLD-SCNC: 104 MMOL/L (ref 98–107)
CHOLESTEROL, TOTAL: 132 MG/DL (ref 0–199)
CO2: 22 MMOL/L (ref 22–29)
CREAT SERPL-MCNC: 1.3 MG/DL (ref 0.7–1.2)
FOLATE: 7.1 NG/ML (ref 4.8–24.2)
GFR AFRICAN AMERICAN: >60
GFR NON-AFRICAN AMERICAN: 54 ML/MIN/1.73
GLUCOSE FASTING: 99 MG/DL (ref 74–99)
HCT VFR BLD CALC: 30.7 % (ref 37–54)
HDLC SERPL-MCNC: 38 MG/DL
HEMOGLOBIN: 9 G/DL (ref 12.5–16.5)
IRON % SATURATION: 6 % (ref 20–55)
IRON: 27 MCG/DL (ref 59–158)
LDL CHOLESTEROL CALCULATED: 80 MG/DL (ref 0–99)
MCH RBC QN AUTO: 23.3 PG (ref 26–35)
MCHC RBC AUTO-ENTMCNC: 29.3 % (ref 32–34.5)
MCV RBC AUTO: 79.3 FL (ref 80–99.9)
PDW BLD-RTO: 19.5 FL (ref 11.5–15)
PLATELET # BLD: 462 E9/L (ref 130–450)
PMV BLD AUTO: 9.4 FL (ref 7–12)
POTASSIUM SERPL-SCNC: 4.6 MMOL/L (ref 3.5–5)
PROSTATE SPECIFIC ANTIGEN: 0.75 NG/ML (ref 0–4)
RBC # BLD: 3.87 E12/L (ref 3.8–5.8)
SODIUM BLD-SCNC: 141 MMOL/L (ref 132–146)
TOTAL IRON BINDING CAPACITY: 427 MCG/DL (ref 250–450)
TOTAL PROTEIN: 7.2 G/DL (ref 6.4–8.3)
TRIGL SERPL-MCNC: 72 MG/DL (ref 0–149)
VITAMIN B-12: 214 PG/ML (ref 211–946)
VLDLC SERPL CALC-MCNC: 14 MG/DL
WBC # BLD: 5.1 E9/L (ref 4.5–11.5)

## 2022-01-25 PROCEDURE — 1123F ACP DISCUSS/DSCN MKR DOCD: CPT | Performed by: FAMILY MEDICINE

## 2022-01-25 PROCEDURE — 99214 OFFICE O/P EST MOD 30 MIN: CPT | Performed by: FAMILY MEDICINE

## 2022-01-25 PROCEDURE — 4040F PNEUMOC VAC/ADMIN/RCVD: CPT | Performed by: FAMILY MEDICINE

## 2022-01-25 PROCEDURE — 1036F TOBACCO NON-USER: CPT | Performed by: FAMILY MEDICINE

## 2022-01-25 PROCEDURE — G8484 FLU IMMUNIZE NO ADMIN: HCPCS | Performed by: FAMILY MEDICINE

## 2022-01-25 PROCEDURE — G8427 DOCREV CUR MEDS BY ELIG CLIN: HCPCS | Performed by: FAMILY MEDICINE

## 2022-01-25 PROCEDURE — G8417 CALC BMI ABV UP PARAM F/U: HCPCS | Performed by: FAMILY MEDICINE

## 2022-01-25 PROCEDURE — 3017F COLORECTAL CA SCREEN DOC REV: CPT | Performed by: FAMILY MEDICINE

## 2022-01-25 RX ORDER — MINOCYCLINE HYDROCHLORIDE 100 MG/1
100 CAPSULE ORAL 2 TIMES DAILY
Qty: 30 CAPSULE | Refills: 0 | Status: SHIPPED | OUTPATIENT
Start: 2022-01-25

## 2022-01-25 RX ORDER — KETOCONAZOLE 20 MG/ML
SHAMPOO TOPICAL
Qty: 120 ML | Refills: 1 | Status: SHIPPED | OUTPATIENT
Start: 2022-01-25

## 2022-01-25 SDOH — ECONOMIC STABILITY: FOOD INSECURITY: WITHIN THE PAST 12 MONTHS, YOU WORRIED THAT YOUR FOOD WOULD RUN OUT BEFORE YOU GOT MONEY TO BUY MORE.: NEVER TRUE

## 2022-01-25 SDOH — ECONOMIC STABILITY: FOOD INSECURITY: WITHIN THE PAST 12 MONTHS, THE FOOD YOU BOUGHT JUST DIDN'T LAST AND YOU DIDN'T HAVE MONEY TO GET MORE.: NEVER TRUE

## 2022-01-25 ASSESSMENT — PATIENT HEALTH QUESTIONNAIRE - PHQ9
SUM OF ALL RESPONSES TO PHQ QUESTIONS 1-9: 0
SUM OF ALL RESPONSES TO PHQ QUESTIONS 1-9: 0
SUM OF ALL RESPONSES TO PHQ9 QUESTIONS 1 & 2: 0
2. FEELING DOWN, DEPRESSED OR HOPELESS: 0
SUM OF ALL RESPONSES TO PHQ QUESTIONS 1-9: 0
1. LITTLE INTEREST OR PLEASURE IN DOING THINGS: 0
SUM OF ALL RESPONSES TO PHQ QUESTIONS 1-9: 0

## 2022-01-25 ASSESSMENT — SOCIAL DETERMINANTS OF HEALTH (SDOH): HOW HARD IS IT FOR YOU TO PAY FOR THE VERY BASICS LIKE FOOD, HOUSING, MEDICAL CARE, AND HEATING?: NOT HARD AT ALL

## 2022-01-25 NOTE — PROGRESS NOTES
Bebo Bob (:  1950) is a 66 y.o. male,Established patient, here for evaluation of the following chief complaint(s):  Hypertension (Rx needed initially however was previously sent to pharmacy) and Other (Syed ears dry and has was request checked)         ASSESSMENT/PLAN:  1. Primary hypertension  -     CBC; Future  -     Comprehensive Metabolic Panel, Fasting; Future  -     Lipid Panel; Future  2. Prostate cancer screening  -     CBC; Future  -     Comprehensive Metabolic Panel, Fasting; Future  -     PSA screening; Future  3. Anemia, unspecified type  -     Iron and TIBC; Future  4. Iron deficiency anemia, unspecified iron deficiency anemia type      PLAN:   We will need follow-up for BP check.  Consider pneumonia vaccine, Shingrix and Tdap.  Advised weight loss.  Advised KAROLYN diet combined with cardiovascular exercise minimum 150 minutes weekly.  Labs drawn. Return in about 6 months (around 2022) for Follow up. Subjective   SUBJECTIVE/OBJECTIVE:  Patient here for routine follow-up. He is technically nonfasting. He did have a cup of coffee this morning with creamer in it but is agreeable to having labs done. He was tentatively scheduled for a follow-up appointment in August but canceled the appointment. A colonoscopy was previously recommended but patient did not follow through. He had negative Hemoccult testing done 2021. He does complain of a rash to his upper lip near the nostrils as well as in his beard that is scaly erythematous at times. He was prescribed minocycline 100 mg twice daily by his prior PCP. Review of Systems   Constitutional: Negative for chills, fatigue and fever. HENT: Negative for congestion, ear discharge, ear pain, facial swelling, hearing loss, nosebleeds, rhinorrhea, sinus pressure and sore throat. Eyes: Negative for photophobia, pain, discharge, itching and visual disturbance.    Respiratory: Negative for cough, shortness of membranes are moist.      Pharynx: No oropharyngeal exudate or posterior oropharyngeal erythema. Comments: Missing teeth noted. Eyes:      General: No scleral icterus. Extraocular Movements: Extraocular movements intact. Conjunctiva/sclera: Conjunctivae normal.      Pupils: Pupils are equal, round, and reactive to light. Neck:      Vascular: No carotid bruit. Comments: Trachea midline. No JVD. Cardiovascular:      Rate and Rhythm: Normal rate and regular rhythm. Pulses: Normal pulses. Heart sounds: Normal heart sounds. No murmur heard. Pulmonary:      Effort: No respiratory distress. Breath sounds: No wheezing, rhonchi or rales. Abdominal:      General: Bowel sounds are normal. There is no distension. Palpations: There is no mass. Tenderness: There is no abdominal tenderness. There is no guarding or rebound. Musculoskeletal:         General: No swelling, tenderness or deformity. Normal range of motion. Cervical back: Normal range of motion. Right lower leg: No edema. Left lower leg: No edema. Lymphadenopathy:      Cervical: No cervical adenopathy. Skin:     General: Skin is warm and dry. Neurological:      General: No focal deficit present. Mental Status: He is alert and oriented to person, place, and time. Cranial Nerves: No cranial nerve deficit. Psychiatric:         Mood and Affect: Mood normal.         Behavior: Behavior normal.         Thought Content:  Thought content normal.         Judgment: Judgment normal.            CBC  WBC   Date Value Ref Range Status   01/25/2022 5.1 4.5 - 11.5 E9/L Final     RBC   Date Value Ref Range Status   01/25/2022 3.87 3.80 - 5.80 E12/L Final     Hemoglobin   Date Value Ref Range Status   01/25/2022 9.0 (L) 12.5 - 16.5 g/dL Final     Hematocrit   Date Value Ref Range Status   01/25/2022 30.7 (L) 37.0 - 54.0 % Final     MCV   Date Value Ref Range Status   01/25/2022 79.3 (L) 80.0 - 99.9 fL Final     MCH   Date Value Ref Range Status   01/25/2022 23.3 (L) 26.0 - 35.0 pg Final     MCHC   Date Value Ref Range Status   01/25/2022 29.3 (L) 32.0 - 34.5 % Final     RDW   Date Value Ref Range Status   01/25/2022 19.5 (H) 11.5 - 15.0 fL Final     Platelets   Date Value Ref Range Status   01/25/2022 462 (H) 130 - 450 E9/L Final     MPV   Date Value Ref Range Status   01/25/2022 9.4 7.0 - 12.0 fL Final     Neutrophils %   Date Value Ref Range Status   05/27/2016 51 43 - 80 % Final     Lymphocytes %   Date Value Ref Range Status   05/27/2016 30 20 - 42 % Final     Monocytes %   Date Value Ref Range Status   05/27/2016 11 2 - 12 % Final     Eosinophils %   Date Value Ref Range Status   05/27/2016 8 (H) 0 - 6 % Final     Basophils %   Date Value Ref Range Status   05/27/2016 0 0 - 2 % Final     Neutrophils Absolute   Date Value Ref Range Status   05/27/2016 2.43 1.80 - 7.30 E9/L Final     Lymphocytes Absolute   Date Value Ref Range Status   05/27/2016 1.43 (L) 1.50 - 4.00 E9/L Final     Monocytes Absolute   Date Value Ref Range Status   05/27/2016 0.52 0.10 - 0.95 E9/L Final     Eosinophils Absolute   Date Value Ref Range Status   05/27/2016 0.38 0.05 - 0.50 E9/L Final     Basophils Absolute   Date Value Ref Range Status   05/27/2016 0.02 0.00 - 0.20 E9/L Final       CMP  Sodium   Date Value Ref Range Status   01/25/2022 141 132 - 146 mmol/L Final     Potassium   Date Value Ref Range Status   01/25/2022 4.6 3.5 - 5.0 mmol/L Final     Chloride   Date Value Ref Range Status   01/25/2022 104 98 - 107 mmol/L Final     CO2   Date Value Ref Range Status   01/25/2022 22 22 - 29 mmol/L Final     Anion Gap   Date Value Ref Range Status   01/25/2022 15 7 - 16 mmol/L Final     Glucose   Date Value Ref Range Status   05/27/2016 101 74 - 109 mg/dL Final     BUN   Date Value Ref Range Status   01/25/2022 23 6 - 23 mg/dL Final     CREATININE   Date Value Ref Range Status   01/25/2022 1.3 (H) 0.7 - 1.2 mg/dL Final     GFR Non-   Date Value Ref Range Status   01/25/2022 54 >=60 mL/min/1.73 Final     Comment:     Chronic Kidney Disease: less than 60 ml/min/1.73 sq.m. Kidney Failure: less than 15 ml/min/1.73 sq.m. Results valid for patients 18 years and older. GFR    Date Value Ref Range Status   01/25/2022 >60  Final     Calcium   Date Value Ref Range Status   01/25/2022 8.6 8.6 - 10.2 mg/dL Final     Total Protein   Date Value Ref Range Status   01/25/2022 7.2 6.4 - 8.3 g/dL Final     Albumin   Date Value Ref Range Status   01/25/2022 3.8 3.5 - 5.2 g/dL Final     Total Bilirubin   Date Value Ref Range Status   01/25/2022 0.3 0.0 - 1.2 mg/dL Final     Alkaline Phosphatase   Date Value Ref Range Status   01/25/2022 111 40 - 129 U/L Final     ALT   Date Value Ref Range Status   01/25/2022 13 0 - 40 U/L Final     AST   Date Value Ref Range Status   01/25/2022 23 0 - 39 U/L Final       TSH  Lab Results   Component Value Date    TSH 1.830 05/27/2016       A1C  No results found for: LABA1C    LIPID  Lab Results   Component Value Date    CHOL 132 01/25/2022    TRIG 72 01/25/2022    HDL 38 01/25/2022    LDLCALC 80 01/25/2022    LABVLDL 14 01/25/2022        PSA  Lab Results   Component Value Date    PSA 0.75 01/25/2022       No results found for this visit on 01/25/22. An electronic signature was used to authenticate this note.     --Savage Michael DO

## 2022-01-26 PROBLEM — D50.9 IRON DEFICIENCY ANEMIA: Status: ACTIVE | Noted: 2022-01-26

## 2022-01-26 ASSESSMENT — ENCOUNTER SYMPTOMS
SHORTNESS OF BREATH: 0
WHEEZING: 0
COLOR CHANGE: 0
FACIAL SWELLING: 0
ABDOMINAL DISTENTION: 0
COUGH: 0
SINUS PRESSURE: 0
SORE THROAT: 0
VOMITING: 0
BLOOD IN STOOL: 0
EYE ITCHING: 0
RHINORRHEA: 0
PHOTOPHOBIA: 0
ABDOMINAL PAIN: 0
EYE PAIN: 0
DIARRHEA: 0
EYE DISCHARGE: 0
CONSTIPATION: 0
NAUSEA: 0

## 2022-01-28 RX ORDER — FERROUS SULFATE 325(65) MG
325 TABLET ORAL 2 TIMES DAILY
Qty: 60 TABLET | Refills: 5 | Status: SHIPPED | OUTPATIENT
Start: 2022-01-28

## 2022-02-08 RX ORDER — LOSARTAN POTASSIUM AND HYDROCHLOROTHIAZIDE 25; 100 MG/1; MG/1
1 TABLET ORAL DAILY
Qty: 90 TABLET | Refills: 1 | Status: SHIPPED
Start: 2022-02-08 | End: 2022-09-09

## 2022-04-03 NOTE — TELEPHONE ENCOUNTER
Patient requesting    Losartan /12.5 1 qd #90      No ([id number] [facility])
Verified with chart.
warm

## 2022-05-16 RX ORDER — LOSARTAN POTASSIUM AND HYDROCHLOROTHIAZIDE 25; 100 MG/1; MG/1
1 TABLET ORAL DAILY
Qty: 90 TABLET | Refills: 1 | OUTPATIENT
Start: 2022-05-16

## 2022-09-09 RX ORDER — LOSARTAN POTASSIUM AND HYDROCHLOROTHIAZIDE 25; 100 MG/1; MG/1
1 TABLET ORAL DAILY
Qty: 90 TABLET | Refills: 0 | Status: SHIPPED | OUTPATIENT
Start: 2022-09-09

## 2022-11-09 ENCOUNTER — TELEPHONE (OUTPATIENT)
Dept: FAMILY MEDICINE CLINIC | Age: 72
End: 2022-11-09

## 2022-11-09 NOTE — TELEPHONE ENCOUNTER
----- Message from Loan Andrew sent at 11/9/2022 12:32 PM EST -----  Subject: Appointment Request    Reason for Call: New Patient/New to Provider Appointment needed: New   Patient Request Appointment    QUESTIONS    Reason for appointment request? Requested Provider unavailable - Evonne Alvarez     Additional Information for Provider?  Wife Karlos Gannon saw him yesterday and   said he would take on her  so she is needing to set him up with an   appt, please call to schedule   ---------------------------------------------------------------------------  --------------  Zoltan Veliz INFO  [phone]; OK to leave message on voicemail  ---------------------------------------------------------------------------  --------------  SCRIPT ANSWERS  COVID Screen: Waqar Arroyo

## 2022-11-10 NOTE — TELEPHONE ENCOUNTER
Mary Lou HUI yx [facility]   Subject: Message to Provider     QUESTIONS   Information for Provider? Pt's wife Karolina Velasquez is calling to check on the   status of appt request, pls give her a call back. ---------------------------------------------------------------------------   --------------   [facility] Bosworth [facility]   [phone]; OK to leave message on voicemail   ---------------------------------------------------------------------------   --------------   SCRIPT ANSWERS   Relationship to Patient? Other   Representative Name? Karolina Velasquez   Is the Representative on the appropriate HIPAA document in Epic?  Yes

## 2022-11-10 NOTE — TELEPHONE ENCOUNTER
Informed pt's wife message has been sent to the doctor and we'll call her back once we get a response.

## 2022-12-05 RX ORDER — LOSARTAN POTASSIUM AND HYDROCHLOROTHIAZIDE 25; 100 MG/1; MG/1
1 TABLET ORAL DAILY
Qty: 90 TABLET | Refills: 0 | Status: SHIPPED | OUTPATIENT
Start: 2022-12-05

## 2022-12-05 NOTE — TELEPHONE ENCOUNTER
Pt was last seen 1/25/22. Looks like pt has an appt scheduled 5/8/23 with Lidya Duffy no other appts

## 2022-12-11 ENCOUNTER — HOSPITAL ENCOUNTER (INPATIENT)
Age: 72
LOS: 3 days | Discharge: HOME | DRG: 309 | End: 2022-12-14
Attending: EMERGENCY MEDICINE | Admitting: INTERNAL MEDICINE
Payer: MEDICARE

## 2022-12-11 ENCOUNTER — APPOINTMENT (OUTPATIENT)
Dept: CT IMAGING | Age: 72
DRG: 309 | End: 2022-12-11
Payer: MEDICARE

## 2022-12-11 DIAGNOSIS — R55 NEAR SYNCOPE: ICD-10-CM

## 2022-12-11 DIAGNOSIS — R06.09 DOE (DYSPNEA ON EXERTION): Primary | ICD-10-CM

## 2022-12-11 LAB
ALBUMIN SERPL-MCNC: 3.8 G/DL (ref 3.5–5.2)
ALP BLD-CCNC: 110 U/L (ref 40–129)
ALT SERPL-CCNC: 13 U/L (ref 0–40)
ANION GAP SERPL CALCULATED.3IONS-SCNC: 10 MMOL/L (ref 7–16)
AST SERPL-CCNC: 22 U/L (ref 0–39)
BACTERIA: NORMAL /HPF
BASOPHILS ABSOLUTE: 0.02 E9/L (ref 0–0.2)
BASOPHILS RELATIVE PERCENT: 0.3 % (ref 0–2)
BILIRUB SERPL-MCNC: 0.4 MG/DL (ref 0–1.2)
BILIRUBIN URINE: NEGATIVE
BLOOD, URINE: NEGATIVE
BUN BLDV-MCNC: 16 MG/DL (ref 6–23)
CALCIUM SERPL-MCNC: 8.7 MG/DL (ref 8.6–10.2)
CHLORIDE BLD-SCNC: 102 MMOL/L (ref 98–107)
CLARITY: CLEAR
CO2: 24 MMOL/L (ref 22–29)
COLOR: YELLOW
CREAT SERPL-MCNC: 1.2 MG/DL (ref 0.7–1.2)
D DIMER: >5250 NG/ML DDU
EKG ATRIAL RATE: 119 BPM
EKG Q-T INTERVAL: 370 MS
EKG QRS DURATION: 110 MS
EKG QTC CALCULATION (BAZETT): 440 MS
EKG R AXIS: -31 DEGREES
EKG T AXIS: 10 DEGREES
EKG VENTRICULAR RATE: 85 BPM
EOSINOPHILS ABSOLUTE: 0.1 E9/L (ref 0.05–0.5)
EOSINOPHILS RELATIVE PERCENT: 1.5 % (ref 0–6)
EPITHELIAL CELLS, UA: NORMAL /HPF
GFR SERPL CREATININE-BSD FRML MDRD: >60 ML/MIN/1.73
GLUCOSE BLD-MCNC: 108 MG/DL (ref 74–99)
GLUCOSE URINE: NEGATIVE MG/DL
HCT VFR BLD CALC: 30 % (ref 37–54)
HEMOGLOBIN: 9.2 G/DL (ref 12.5–16.5)
IMMATURE GRANULOCYTES #: 0.02 E9/L
IMMATURE GRANULOCYTES %: 0.3 % (ref 0–5)
KETONES, URINE: NEGATIVE MG/DL
LACTIC ACID: 1.3 MMOL/L (ref 0.5–2.2)
LEUKOCYTE ESTERASE, URINE: NEGATIVE
LYMPHOCYTES ABSOLUTE: 0.73 E9/L (ref 1.5–4)
LYMPHOCYTES RELATIVE PERCENT: 10.7 % (ref 20–42)
MCH RBC QN AUTO: 26 PG (ref 26–35)
MCHC RBC AUTO-ENTMCNC: 30.7 % (ref 32–34.5)
MCV RBC AUTO: 84.7 FL (ref 80–99.9)
MONOCYTES ABSOLUTE: 0.79 E9/L (ref 0.1–0.95)
MONOCYTES RELATIVE PERCENT: 11.6 % (ref 2–12)
NEUTROPHILS ABSOLUTE: 5.15 E9/L (ref 1.8–7.3)
NEUTROPHILS RELATIVE PERCENT: 75.6 % (ref 43–80)
NITRITE, URINE: NEGATIVE
PDW BLD-RTO: 17 FL (ref 11.5–15)
PH UA: 5.5 (ref 5–9)
PLATELET # BLD: 190 E9/L (ref 130–450)
PMV BLD AUTO: 9.5 FL (ref 7–12)
POTASSIUM REFLEX MAGNESIUM: 4.2 MMOL/L (ref 3.5–5)
PRO-BNP: 1272 PG/ML (ref 0–125)
PROTEIN UA: NEGATIVE MG/DL
RBC # BLD: 3.54 E12/L (ref 3.8–5.8)
RBC UA: NORMAL /HPF (ref 0–2)
SODIUM BLD-SCNC: 136 MMOL/L (ref 132–146)
SPECIFIC GRAVITY UA: 1.01 (ref 1–1.03)
TOTAL PROTEIN: 6.8 G/DL (ref 6.4–8.3)
TROPONIN, HIGH SENSITIVITY: 17 NG/L (ref 0–11)
TROPONIN, HIGH SENSITIVITY: 20 NG/L (ref 0–11)
UROBILINOGEN, URINE: 1 E.U./DL
WBC # BLD: 6.8 E9/L (ref 4.5–11.5)
WBC UA: NORMAL /HPF (ref 0–5)

## 2022-12-11 PROCEDURE — 85025 COMPLETE CBC W/AUTO DIFF WBC: CPT

## 2022-12-11 PROCEDURE — 71275 CT ANGIOGRAPHY CHEST: CPT

## 2022-12-11 PROCEDURE — 93010 ELECTROCARDIOGRAM REPORT: CPT | Performed by: INTERNAL MEDICINE

## 2022-12-11 PROCEDURE — 74174 CTA ABD&PLVS W/CONTRAST: CPT

## 2022-12-11 PROCEDURE — 93005 ELECTROCARDIOGRAM TRACING: CPT | Performed by: PHYSICIAN ASSISTANT

## 2022-12-11 PROCEDURE — 70450 CT HEAD/BRAIN W/O DYE: CPT

## 2022-12-11 PROCEDURE — 1200000000 HC SEMI PRIVATE

## 2022-12-11 PROCEDURE — 99285 EMERGENCY DEPT VISIT HI MDM: CPT

## 2022-12-11 PROCEDURE — 83880 ASSAY OF NATRIURETIC PEPTIDE: CPT

## 2022-12-11 PROCEDURE — 80053 COMPREHEN METABOLIC PANEL: CPT

## 2022-12-11 PROCEDURE — 81001 URINALYSIS AUTO W/SCOPE: CPT

## 2022-12-11 PROCEDURE — 6360000004 HC RX CONTRAST MEDICATION: Performed by: RADIOLOGY

## 2022-12-11 PROCEDURE — 83605 ASSAY OF LACTIC ACID: CPT

## 2022-12-11 PROCEDURE — 84484 ASSAY OF TROPONIN QUANT: CPT

## 2022-12-11 PROCEDURE — 85378 FIBRIN DEGRADE SEMIQUANT: CPT

## 2022-12-11 PROCEDURE — 2580000003 HC RX 258: Performed by: PHYSICIAN ASSISTANT

## 2022-12-11 PROCEDURE — 6360000002 HC RX W HCPCS: Performed by: INTERNAL MEDICINE

## 2022-12-11 RX ORDER — 0.9 % SODIUM CHLORIDE 0.9 %
1000 INTRAVENOUS SOLUTION INTRAVENOUS ONCE
Status: COMPLETED | OUTPATIENT
Start: 2022-12-11 | End: 2022-12-11

## 2022-12-11 RX ORDER — PANTOPRAZOLE SODIUM 40 MG/10ML
40 INJECTION, POWDER, LYOPHILIZED, FOR SOLUTION INTRAVENOUS DAILY
Status: DISCONTINUED | OUTPATIENT
Start: 2022-12-12 | End: 2022-12-14 | Stop reason: HOSPADM

## 2022-12-11 RX ORDER — ENOXAPARIN SODIUM 150 MG/ML
1 INJECTION SUBCUTANEOUS 2 TIMES DAILY
Status: DISCONTINUED | OUTPATIENT
Start: 2022-12-11 | End: 2022-12-14 | Stop reason: HOSPADM

## 2022-12-11 RX ORDER — ENOXAPARIN SODIUM 100 MG/ML
30 INJECTION SUBCUTANEOUS 2 TIMES DAILY
Status: CANCELLED | OUTPATIENT
Start: 2022-12-11

## 2022-12-11 RX ADMIN — IOPAMIDOL 75 ML: 755 INJECTION, SOLUTION INTRAVENOUS at 16:53

## 2022-12-11 RX ADMIN — SODIUM CHLORIDE 1000 ML: 9 INJECTION, SOLUTION INTRAVENOUS at 19:53

## 2022-12-11 RX ADMIN — ENOXAPARIN SODIUM 135 MG: 150 INJECTION SUBCUTANEOUS at 23:58

## 2022-12-11 ASSESSMENT — PAIN - FUNCTIONAL ASSESSMENT: PAIN_FUNCTIONAL_ASSESSMENT: NONE - DENIES PAIN

## 2022-12-11 NOTE — ED PROVIDER NOTES
ED Attending shared visit  CC: No      HPI:  12/11/22, Time: 2:44 PM LILLY Ochoa is a 67 y.o. male presenting to the ED for near syncope, shortness of beginning 3 days  ago. The complaint has been intermittent moderate in severity, and worsened by light exertion. Patient comes in with complaint of near syncope that occurred on Friday while he was at work. States he had a sudden onset of feeling dizzy with blackening of his vision and hearing becoming muffled. He denied any chest pain at the time but had palpitations nausea diaphoresis and shortness of breath. Shortness of breath is worse with exertion. As of those symptoms started to resolve he noted some lower back pain that radiated towards the front of right lower quadrant. He denied any pain radiation to the legs. He states as he attempts to walk his legs get numb tingly and feel cool to him. 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 Review of Systems:   A complete review of systems was performed and pertinent positives and negatives are stated within HPI, all other systems reviewed and are negative.          --------------------------------------------- PAST HISTORY ---------------------------------------------  Past Medical History:  has a past medical history of Chronic back pain, Hx of blood clots, Hypertension, and Iron deficiency anemia. Past Surgical History:  has a past surgical history that includes Tonsillectomy; hernia repair; Gastric bypass surgery (2003); back surgery (2008); Vena Cava Filter Placement (2002); Colonoscopy; Cholecystectomy; Intracapsular cataract extraction (Right, 06/23/2020); Cataract removal with implant (Left, 03/09/2021); and Intracapsular cataract extraction (Left, 3/9/2021). Social History:  reports that he has never smoked. He has never used smokeless tobacco. He reports current alcohol use of about 1.0 standard drink per week. He reports that he does not use drugs.     Family History: family history is not on file. The patients home medications have been reviewed. Allergies: Patient has no known allergies.     -------------------------------------------------- RESULTS -------------------------------------------------  All laboratory and radiology results have been personally reviewed by myself   LABS:  Results for orders placed or performed during the hospital encounter of 12/11/22   CBC with Auto Differential   Result Value Ref Range    WBC 6.8 4.5 - 11.5 E9/L    RBC 3.54 (L) 3.80 - 5.80 E12/L    Hemoglobin 9.2 (L) 12.5 - 16.5 g/dL    Hematocrit 30.0 (L) 37.0 - 54.0 %    MCV 84.7 80.0 - 99.9 fL    MCH 26.0 26.0 - 35.0 pg    MCHC 30.7 (L) 32.0 - 34.5 %    RDW 17.0 (H) 11.5 - 15.0 fL    Platelets 989 408 - 830 E9/L    MPV 9.5 7.0 - 12.0 fL    Neutrophils % 75.6 43.0 - 80.0 %    Immature Granulocytes % 0.3 0.0 - 5.0 %    Lymphocytes % 10.7 (L) 20.0 - 42.0 %    Monocytes % 11.6 2.0 - 12.0 %    Eosinophils % 1.5 0.0 - 6.0 %    Basophils % 0.3 0.0 - 2.0 %    Neutrophils Absolute 5.15 1.80 - 7.30 E9/L    Immature Granulocytes # 0.02 E9/L    Lymphocytes Absolute 0.73 (L) 1.50 - 4.00 E9/L    Monocytes Absolute 0.79 0.10 - 0.95 E9/L    Eosinophils Absolute 0.10 0.05 - 0.50 E9/L    Basophils Absolute 0.02 0.00 - 0.20 E9/L   Comprehensive Metabolic Panel w/ Reflex to MG   Result Value Ref Range    Sodium 136 132 - 146 mmol/L    Potassium reflex Magnesium 4.2 3.5 - 5.0 mmol/L    Chloride 102 98 - 107 mmol/L    CO2 24 22 - 29 mmol/L    Anion Gap 10 7 - 16 mmol/L    Glucose 108 (H) 74 - 99 mg/dL    BUN 16 6 - 23 mg/dL    Creatinine 1.2 0.7 - 1.2 mg/dL    Est, Glom Filt Rate >60 >=60 mL/min/1.73    Calcium 8.7 8.6 - 10.2 mg/dL    Total Protein 6.8 6.4 - 8.3 g/dL    Albumin 3.8 3.5 - 5.2 g/dL    Total Bilirubin 0.4 0.0 - 1.2 mg/dL    Alkaline Phosphatase 110 40 - 129 U/L    ALT 13 0 - 40 U/L    AST 22 0 - 39 U/L   Lactic Acid   Result Value Ref Range    Lactic Acid 1.3 0.5 - 2.2 mmol/L   Troponin   Result Value Ref Range    Troponin, High Sensitivity 17 (H) 0 - 11 ng/L   Brain Natriuretic Peptide   Result Value Ref Range    Pro-BNP 1,272 (H) 0 - 125 pg/mL   Urinalysis with Microscopic   Result Value Ref Range    Color, UA Yellow Straw/Yellow    Clarity, UA Clear Clear    Glucose, Ur Negative Negative mg/dL    Bilirubin Urine Negative Negative    Ketones, Urine Negative Negative mg/dL    Specific Gravity, UA 1.010 1.005 - 1.030    Blood, Urine Negative Negative    pH, UA 5.5 5.0 - 9.0    Protein, UA Negative Negative mg/dL    Urobilinogen, Urine 1.0 <2.0 E.U./dL    Nitrite, Urine Negative Negative    Leukocyte Esterase, Urine Negative Negative    WBC, UA NONE 0 - 5 /HPF    RBC, UA NONE 0 - 2 /HPF    Epithelial Cells, UA NONE SEEN /HPF    Bacteria, UA NONE SEEN None Seen /HPF   Troponin   Result Value Ref Range    Troponin, High Sensitivity 20 (H) 0 - 11 ng/L   EKG 12 Lead   Result Value Ref Range    Ventricular Rate 85 BPM    Atrial Rate 119 BPM    QRS Duration 110 ms    Q-T Interval 370 ms    QTc Calculation (Bazett) 440 ms    R Axis -31 degrees    T Axis 10 degrees       RADIOLOGY:  Interpreted by Radiologist.  CTA CHEST W CONTRAST   Final Result   Normal CTA of the chest abdomen/ pelvis without aortic aneurysm, or aortic   dissection. 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 Mild coronary artery calcification. RECOMMENDATIONS:   Unavailable         CTA ABDOMEN PELVIS W CONTRAST   Final Result   Normal CTA of the chest abdomen/ pelvis without aortic aneurysm, or aortic   dissection. 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 Mild coronary artery calcification. RECOMMENDATIONS:   Unavailable         CT HEAD WO CONTRAST   Final Result   No acute intracranial abnormality. ------------------------- NURSING NOTES AND VITALS REVIEWED ---------------------------   The nursing notes within the ED encounter and vital signs as below have been reviewed. BP (!) 169/62   Pulse 81   Temp 97.8 °F (36.6 °C) (Infrared)   Resp 17   Wt (!) 307 lb (139.3 kg)   SpO2 98%   BMI 39.42 kg/m²   Oxygen Saturation Interpretation: Normal      ---------------------------------------------------PHYSICAL EXAM--------------------------------------      Constitutional/General: Alert and oriented x3, well appearing, non toxic in NAD  Head: Normocephalic and atraumatic  Eyes: PERRL, EOMI  Mouth: Oropharynx clear, handling secretions, no trismus  Neck: Supple, full ROM,   Pulmonary: Lungs clear to auscultation bilaterally, no wheezes, rales, or rhonchi. Not in respiratory distress  Cardiovascular:  Regular rate and rhythm, no murmurs, gallops, or rubs. 2+ distal pulses  Abdomen: Soft, non tender, non distended,   Extremities: Moves all extremities x 4. Warm and well perfused chronic vascular appearing changes there is no swelling or erythema present pulses normal bilaterally  Skin: warm and dry without rash  Neurologic: GCS 15,  Psych: Normal Affect      ------------------------------ ED COURSE/MEDICAL DECISION MAKING----------------------  Medications   0.9 % sodium chloride bolus (1,000 mLs IntraVENous New Bag 12/11/22 1953)   iopamidol (ISOVUE-370) 76 % injection 75 mL (75 mLs IntraVENous Given 12/11/22 1653)         ED COURSE:     6030 discussed findings with patient he is agreeable to admission for further evaluation  Medical Decision Making:    Patient came in with complaint of near syncopal episode that occurred on Friday he has had persistent shortness of breath with minimal exertion. CTA of the abdomen chest pelvis no dissection present they were unable to get a view of the pulmonary artery to rule out PE. Patient does have history of DVTs in the distant past with IVC filter in place. Initial troponin 17 with delta of 20. No acute findings on EKG. Patient admitted for further cardiac evaluation and assess for PE    Counseling: The emergency provider has spoken with the patient and discussed todays results, in addition to providing specific details for the plan of care and counseling regarding the diagnosis and prognosis. Questions are answered at this time and they are agreeable with the plan.      --------------------------------- IMPRESSION AND DISPOSITION ---------------------------------    IMPRESSION  1. FELICIANO (dyspnea on exertion)    2. Near syncope        DISPOSITION  Disposition: Admit to telemetry floor   Patient condition is fair      NOTE: This report was transcribed using voice recognition software.  Every effort was made to ensure accuracy; however, inadvertent computerized transcription errors may be present      Inna Wilder  12/11/22 2045

## 2022-12-12 ENCOUNTER — APPOINTMENT (OUTPATIENT)
Dept: GENERAL RADIOLOGY | Age: 72
DRG: 309 | End: 2022-12-12
Payer: MEDICARE

## 2022-12-12 ENCOUNTER — APPOINTMENT (OUTPATIENT)
Dept: NUCLEAR MEDICINE | Age: 72
DRG: 309 | End: 2022-12-12
Payer: MEDICARE

## 2022-12-12 PROBLEM — I48.91 NEW ONSET ATRIAL FIBRILLATION (HCC): Status: ACTIVE | Noted: 2022-12-12

## 2022-12-12 LAB
ADENOVIRUS BY PCR: NOT DETECTED
ALBUMIN SERPL-MCNC: 3.6 G/DL (ref 3.5–5.2)
ALP BLD-CCNC: 106 U/L (ref 40–129)
ALT SERPL-CCNC: 13 U/L (ref 0–40)
ANION GAP SERPL CALCULATED.3IONS-SCNC: 10 MMOL/L (ref 7–16)
AST SERPL-CCNC: 20 U/L (ref 0–39)
BASOPHILS ABSOLUTE: 0.02 E9/L (ref 0–0.2)
BASOPHILS RELATIVE PERCENT: 0.3 % (ref 0–2)
BILIRUB SERPL-MCNC: 0.7 MG/DL (ref 0–1.2)
BORDETELLA PARAPERTUSSIS BY PCR: NOT DETECTED
BORDETELLA PERTUSSIS BY PCR: NOT DETECTED
BUN BLDV-MCNC: 15 MG/DL (ref 6–23)
CALCIUM SERPL-MCNC: 8.5 MG/DL (ref 8.6–10.2)
CHLAMYDOPHILIA PNEUMONIAE BY PCR: NOT DETECTED
CHLORIDE BLD-SCNC: 104 MMOL/L (ref 98–107)
CHOLESTEROL, TOTAL: 112 MG/DL (ref 0–199)
CO2: 23 MMOL/L (ref 22–29)
CORONAVIRUS 229E BY PCR: NOT DETECTED
CORONAVIRUS HKU1 BY PCR: NOT DETECTED
CORONAVIRUS NL63 BY PCR: NOT DETECTED
CORONAVIRUS OC43 BY PCR: NOT DETECTED
CREAT SERPL-MCNC: 1.2 MG/DL (ref 0.7–1.2)
EOSINOPHILS ABSOLUTE: 0.2 E9/L (ref 0.05–0.5)
EOSINOPHILS RELATIVE PERCENT: 3.1 % (ref 0–6)
GFR SERPL CREATININE-BSD FRML MDRD: >60 ML/MIN/1.73
GLUCOSE BLD-MCNC: 95 MG/DL (ref 74–99)
HBA1C MFR BLD: 5.3 % (ref 4–5.6)
HCT VFR BLD CALC: 29.4 % (ref 37–54)
HDLC SERPL-MCNC: 40 MG/DL
HEMOGLOBIN: 9 G/DL (ref 12.5–16.5)
HUMAN METAPNEUMOVIRUS BY PCR: NOT DETECTED
HUMAN RHINOVIRUS/ENTEROVIRUS BY PCR: NOT DETECTED
IMMATURE GRANULOCYTES #: 0.03 E9/L
IMMATURE GRANULOCYTES %: 0.5 % (ref 0–5)
INFLUENZA A BY PCR: NOT DETECTED
INFLUENZA B BY PCR: NOT DETECTED
INR BLD: 1.2
LDL CHOLESTEROL CALCULATED: 61 MG/DL (ref 0–99)
LYMPHOCYTES ABSOLUTE: 1.01 E9/L (ref 1.5–4)
LYMPHOCYTES RELATIVE PERCENT: 15.8 % (ref 20–42)
MAGNESIUM: 2.2 MG/DL (ref 1.6–2.6)
MCH RBC QN AUTO: 26.2 PG (ref 26–35)
MCHC RBC AUTO-ENTMCNC: 30.6 % (ref 32–34.5)
MCV RBC AUTO: 85.5 FL (ref 80–99.9)
MONOCYTES ABSOLUTE: 0.81 E9/L (ref 0.1–0.95)
MONOCYTES RELATIVE PERCENT: 12.6 % (ref 2–12)
MYCOPLASMA PNEUMONIAE BY PCR: NOT DETECTED
NEUTROPHILS ABSOLUTE: 4.34 E9/L (ref 1.8–7.3)
NEUTROPHILS RELATIVE PERCENT: 67.7 % (ref 43–80)
PARAINFLUENZA VIRUS 1 BY PCR: NOT DETECTED
PARAINFLUENZA VIRUS 2 BY PCR: NOT DETECTED
PARAINFLUENZA VIRUS 3 BY PCR: NOT DETECTED
PARAINFLUENZA VIRUS 4 BY PCR: NOT DETECTED
PDW BLD-RTO: 17.2 FL (ref 11.5–15)
PHOSPHORUS: 3 MG/DL (ref 2.5–4.5)
PLATELET # BLD: 191 E9/L (ref 130–450)
PMV BLD AUTO: 9.5 FL (ref 7–12)
POTASSIUM SERPL-SCNC: 3.8 MMOL/L (ref 3.5–5)
PROCALCITONIN: 0.05 NG/ML (ref 0–0.08)
PROTHROMBIN TIME: 14.3 SEC (ref 9.3–12.4)
RBC # BLD: 3.44 E12/L (ref 3.8–5.8)
RESPIRATORY SYNCYTIAL VIRUS BY PCR: NOT DETECTED
SARS-COV-2, PCR: NOT DETECTED
SODIUM BLD-SCNC: 137 MMOL/L (ref 132–146)
T4 FREE: 1.04 NG/DL (ref 0.93–1.7)
TOTAL PROTEIN: 6.5 G/DL (ref 6.4–8.3)
TRIGL SERPL-MCNC: 53 MG/DL (ref 0–149)
TSH SERPL DL<=0.05 MIU/L-ACNC: 1.87 UIU/ML (ref 0.27–4.2)
VLDLC SERPL CALC-MCNC: 11 MG/DL
WBC # BLD: 6.4 E9/L (ref 4.5–11.5)

## 2022-12-12 PROCEDURE — 80053 COMPREHEN METABOLIC PANEL: CPT

## 2022-12-12 PROCEDURE — 84100 ASSAY OF PHOSPHORUS: CPT

## 2022-12-12 PROCEDURE — 1200000000 HC SEMI PRIVATE

## 2022-12-12 PROCEDURE — 80061 LIPID PANEL: CPT

## 2022-12-12 PROCEDURE — 83735 ASSAY OF MAGNESIUM: CPT

## 2022-12-12 PROCEDURE — 2580000003 HC RX 258: Performed by: INTERNAL MEDICINE

## 2022-12-12 PROCEDURE — 6370000000 HC RX 637 (ALT 250 FOR IP): Performed by: NURSE PRACTITIONER

## 2022-12-12 PROCEDURE — 85025 COMPLETE CBC W/AUTO DIFF WBC: CPT

## 2022-12-12 PROCEDURE — 78582 LUNG VENTILAT&PERFUS IMAGING: CPT

## 2022-12-12 PROCEDURE — C9113 INJ PANTOPRAZOLE SODIUM, VIA: HCPCS | Performed by: INTERNAL MEDICINE

## 2022-12-12 PROCEDURE — 84439 ASSAY OF FREE THYROXINE: CPT

## 2022-12-12 PROCEDURE — 97161 PT EVAL LOW COMPLEX 20 MIN: CPT | Performed by: PHYSICAL THERAPIST

## 2022-12-12 PROCEDURE — 6360000002 HC RX W HCPCS: Performed by: INTERNAL MEDICINE

## 2022-12-12 PROCEDURE — 83036 HEMOGLOBIN GLYCOSYLATED A1C: CPT

## 2022-12-12 PROCEDURE — 84252 ASSAY OF VITAMIN B-2: CPT

## 2022-12-12 PROCEDURE — 0202U NFCT DS 22 TRGT SARS-COV-2: CPT

## 2022-12-12 PROCEDURE — A9539 TC99M PENTETATE: HCPCS | Performed by: RADIOLOGY

## 2022-12-12 PROCEDURE — 71046 X-RAY EXAM CHEST 2 VIEWS: CPT

## 2022-12-12 PROCEDURE — 3430000000 HC RX DIAGNOSTIC RADIOPHARMACEUTICAL: Performed by: RADIOLOGY

## 2022-12-12 PROCEDURE — 6370000000 HC RX 637 (ALT 250 FOR IP): Performed by: INTERNAL MEDICINE

## 2022-12-12 PROCEDURE — 84145 PROCALCITONIN (PCT): CPT

## 2022-12-12 PROCEDURE — A9540 TC99M MAA: HCPCS | Performed by: RADIOLOGY

## 2022-12-12 PROCEDURE — 85610 PROTHROMBIN TIME: CPT

## 2022-12-12 PROCEDURE — 36415 COLL VENOUS BLD VENIPUNCTURE: CPT

## 2022-12-12 PROCEDURE — 84443 ASSAY THYROID STIM HORMONE: CPT

## 2022-12-12 RX ORDER — ACETAMINOPHEN 650 MG/1
650 SUPPOSITORY RECTAL EVERY 6 HOURS PRN
Status: DISCONTINUED | OUTPATIENT
Start: 2022-12-12 | End: 2022-12-14 | Stop reason: HOSPADM

## 2022-12-12 RX ORDER — ONDANSETRON 2 MG/ML
4 INJECTION INTRAMUSCULAR; INTRAVENOUS EVERY 6 HOURS PRN
Status: DISCONTINUED | OUTPATIENT
Start: 2022-12-12 | End: 2022-12-14 | Stop reason: HOSPADM

## 2022-12-12 RX ORDER — POLYETHYLENE GLYCOL 3350 17 G/17G
17 POWDER, FOR SOLUTION ORAL DAILY PRN
Status: DISCONTINUED | OUTPATIENT
Start: 2022-12-12 | End: 2022-12-14 | Stop reason: HOSPADM

## 2022-12-12 RX ORDER — SODIUM CHLORIDE 9 MG/ML
INJECTION, SOLUTION INTRAVENOUS PRN
Status: DISCONTINUED | OUTPATIENT
Start: 2022-12-12 | End: 2022-12-14 | Stop reason: HOSPADM

## 2022-12-12 RX ORDER — POTASSIUM CHLORIDE 20 MEQ/1
40 TABLET, EXTENDED RELEASE ORAL PRN
Status: DISCONTINUED | OUTPATIENT
Start: 2022-12-12 | End: 2022-12-14 | Stop reason: HOSPADM

## 2022-12-12 RX ORDER — ACETAMINOPHEN 325 MG/1
650 TABLET ORAL EVERY 6 HOURS PRN
Status: DISCONTINUED | OUTPATIENT
Start: 2022-12-12 | End: 2022-12-14 | Stop reason: HOSPADM

## 2022-12-12 RX ORDER — SODIUM CHLORIDE 0.9 % (FLUSH) 0.9 %
5-40 SYRINGE (ML) INJECTION EVERY 12 HOURS SCHEDULED
Status: DISCONTINUED | OUTPATIENT
Start: 2022-12-12 | End: 2022-12-14 | Stop reason: HOSPADM

## 2022-12-12 RX ORDER — ONDANSETRON 4 MG/1
4 TABLET, ORALLY DISINTEGRATING ORAL EVERY 8 HOURS PRN
Status: DISCONTINUED | OUTPATIENT
Start: 2022-12-12 | End: 2022-12-14 | Stop reason: HOSPADM

## 2022-12-12 RX ORDER — SODIUM CHLORIDE 0.9 % (FLUSH) 0.9 %
5-40 SYRINGE (ML) INJECTION PRN
Status: DISCONTINUED | OUTPATIENT
Start: 2022-12-12 | End: 2022-12-14 | Stop reason: HOSPADM

## 2022-12-12 RX ORDER — OXYCODONE HYDROCHLORIDE AND ACETAMINOPHEN 5; 325 MG/1; MG/1
1 TABLET ORAL EVERY 8 HOURS PRN
Status: DISCONTINUED | OUTPATIENT
Start: 2022-12-12 | End: 2022-12-14 | Stop reason: HOSPADM

## 2022-12-12 RX ORDER — MAGNESIUM SULFATE 1 G/100ML
1000 INJECTION INTRAVENOUS PRN
Status: DISCONTINUED | OUTPATIENT
Start: 2022-12-12 | End: 2022-12-14 | Stop reason: HOSPADM

## 2022-12-12 RX ORDER — METOPROLOL SUCCINATE 25 MG/1
25 TABLET, EXTENDED RELEASE ORAL DAILY
Status: DISCONTINUED | OUTPATIENT
Start: 2022-12-12 | End: 2022-12-14 | Stop reason: HOSPADM

## 2022-12-12 RX ORDER — POTASSIUM CHLORIDE 7.45 MG/ML
10 INJECTION INTRAVENOUS PRN
Status: DISCONTINUED | OUTPATIENT
Start: 2022-12-12 | End: 2022-12-14 | Stop reason: HOSPADM

## 2022-12-12 RX ORDER — DEXTROSE MONOHYDRATE 100 MG/ML
INJECTION, SOLUTION INTRAVENOUS CONTINUOUS PRN
Status: DISCONTINUED | OUTPATIENT
Start: 2022-12-12 | End: 2022-12-14 | Stop reason: HOSPADM

## 2022-12-12 RX ORDER — KIT FOR THE PREPARATION OF TECHNETIUM TC 99M PENTETATE 20 MG/1
35 INJECTION, POWDER, LYOPHILIZED, FOR SOLUTION INTRAVENOUS; RESPIRATORY (INHALATION)
Status: COMPLETED | OUTPATIENT
Start: 2022-12-12 | End: 2022-12-12

## 2022-12-12 RX ADMIN — Medication 7 ML: at 20:41

## 2022-12-12 RX ADMIN — Medication 10 ML: at 11:49

## 2022-12-12 RX ADMIN — Medication 6 MILLICURIE: at 13:49

## 2022-12-12 RX ADMIN — METOPROLOL SUCCINATE 25 MG: 25 TABLET, FILM COATED, EXTENDED RELEASE ORAL at 11:43

## 2022-12-12 RX ADMIN — ACETAMINOPHEN 650 MG: 325 TABLET ORAL at 02:49

## 2022-12-12 RX ADMIN — ENOXAPARIN SODIUM 135 MG: 150 INJECTION SUBCUTANEOUS at 20:38

## 2022-12-12 RX ADMIN — PANTOPRAZOLE SODIUM 40 MG: 40 INJECTION, POWDER, FOR SOLUTION INTRAVENOUS at 11:45

## 2022-12-12 RX ADMIN — ENOXAPARIN SODIUM 135 MG: 150 INJECTION SUBCUTANEOUS at 11:44

## 2022-12-12 RX ADMIN — KIT FOR THE PREPARATION OF TECHNETIUM TC 99M PENTETATE 35 MILLICURIE: 20 INJECTION, POWDER, LYOPHILIZED, FOR SOLUTION INTRAVENOUS; RESPIRATORY (INHALATION) at 13:41

## 2022-12-12 ASSESSMENT — PAIN SCALES - GENERAL
PAINLEVEL_OUTOF10: 4
PAINLEVEL_OUTOF10: 0

## 2022-12-12 NOTE — H&P
Department of Internal Medicine  History and Physical    PCP: Jennifer Randhawa DO  Admitting Physician: Dr. Dorinda Vinson  Consultants:   Date of Service: 12/11/2022    CHIEF COMPLAINT:  sob/back pain    HISTORY OF PRESENT ILLNESS:    Patient is 71-year-old male who presents to the ED due to back pain and shortness of breath on exertion. Patient also had an episode of near syncope on Friday. He states he had sudden onset of lightheadedness and blackening of his vision. His hearing also became muffled. He denies any chest pain but has palpitations, nausea, diaphoresis and shortness of breath. Patient states that his symptoms apparently started all of a sudden on Friday. States that he was at work and standing when he had increased pain in his right lower back. States that he also started to have increased shortness of breath especially/even more with exertion. However sitting down or laying down pain is improved and he does not have any shortness of breath. Otherwise he denies any chest pain or palpitations. He denies any subjective fever or chills. States she had a previous spinal surgery around 2008 . I denies any pain radiation to his legs. However if he stands for too long and the pain increases in his lower back he does feel like his lower extremities become cold and he has numbness and tingling.     PAST MEDICAL Hx:  Past Medical History:   Diagnosis Date    Chronic back pain 2008    swarnoma     Hx of blood clots 2003    after gastric bypass surgery    Hypertension     Iron deficiency anemia 1/26/2022       PAST SURGICAL Hx:   Past Surgical History:   Procedure Laterality Date    BACK SURGERY  2008    spine-herniated disc    CATARACT REMOVAL WITH IMPLANT Left 03/09/2021    CHOLECYSTECTOMY      COLONOSCOPY      GASTRIC BYPASS SURGERY  2003    HERNIA REPAIR      INTRACAPSULAR CATARACT EXTRACTION Right 06/23/2020    RIGHT EYE CATARACT EMULSIFICATION IOL IMPLANT performed by Keysha Croft MD at [facility]    INTRACAPSULAR CATARACT EXTRACTION Left 3/9/2021    LEFT CATARACT EXTRACTION WITH IOL performed by Damon Montenegro MD at [address]  2002       FAMILY Hx:  History reviewed. No pertinent family history. HOME MEDICATIONS:  Prior to Admission medications    Medication Sig Start Date End Date Taking? Authorizing Provider   losartan-hydroCHLOROthiazide (HYZAAR) 100-25 MG per tablet TAKE 1 TABLET BY MOUTH DAILY 12/5/22   Claire Campos DO       ALLERGIES:  Patient has no known allergies. SOCIAL Hx:  Social History     Socioeconomic History    Marital status:      Spouse name: Not on file    Number of children: Not on file    Years of education: Not on file    Highest education level: Not on file   Occupational History    Not on file   Tobacco Use    Smoking status: Never    Smokeless tobacco: Never   Vaping Use    Vaping Use: Never used   Substance and Sexual Activity    Alcohol use:  Yes     Alcohol/week: 1.0 standard drink     Types: 1 Glasses of wine per week     Comment: 1 glass of wine daily    Drug use: No    Sexual activity: Not on file   Other Topics Concern    Not on file   Social History Narrative    Not on file     Social Determinants of Health     Financial Resource Strain: Low Risk     Difficulty of Paying Living Expenses: Not hard at all   Food Insecurity: No Food Insecurity    Worried About Running Out of Food in the Last Year: Never true    Ran Out of Food in the Last Year: Never true   Transportation Needs: Not on file   Physical Activity: Not on file   Stress: Not on file   Social Connections: Not on file   Intimate Partner Violence: Not on file   Housing Stability: Not on file       ROS: Positive in bold  General:   Denies chills, fatigue, fever, malaise, night sweats or weight loss    Psychological:   Denies anxiety, disorientation or hallucinations    ENT:    Denies epistaxis, headaches, vertigo or visual changes    Cardiovascular:   Denies any chest pain, irregular heartbeats, or palpitations. No paroxysmal nocturnal dyspnea. Respiratory:   Denies shortness of breath, coughing, sputum production, hemoptysis, or wheezing. No orthopnea. Gastrointestinal:   Denies nausea, vomiting, diarrhea, or constipation. Denies any abdominal pain. Denies change in bowel habits or stools. Genito-Urinary:    Denies any urgency, frequency, hematuria. Voiding without difficulty. Musculoskeletal:   Denies joint pain, joint stiffness, joint swelling or muscle pain    Neurology:    Denies any headache or focal neurological deficits. No weakness or paresthesia. Derm:    Denies any rashes, ulcers, or excoriations. Denies bruising. Extremities:   Denies any lower extremity swelling or edema. PHYSICAL EXAM: Abnormal findings noted  VITALS:  Vitals:    12/11/22 1734   BP: 137/81   Pulse: 75   Resp: 18   Temp:    SpO2: 99%         CONSTITUTIONAL:    Awake, alert, cooperative, no apparent distress, and appears stated age    EYES:     EOMI, sclera clear, conjunctiva normal    ENT:    Normocephalic, atraumatic, External ears without lesions. NECK:    Supple, symmetrical, trachea midline, no JVD    HEMATOLOGIC/LYMPHATICS:    No cervical lymphadenopathy and no supraclavicular lymphadenopathy    LUNGS:    Symmetric. No increased work of breathing, good air exchange, clear to auscultation bilaterally, no wheezes, rhonchi, or rales,     CARDIOVASCULAR:    Normal apical impulse, regular rate and rhythm, normal S1 and S2, no S3 or S4, and no murmur noted    ABDOMEN:     soft, non-distended, non-tender    MUSCULOSKELETAL:    There is no redness, warmth, or swelling of the joints. NEUROLOGIC:    Awake, alert, oriented to name, place and time. SKIN:    No bruising or bleeding. No redness, warmth, or swelling    EXTREMITIES:    Peripheral pulses present. No edema, cyanosis, or swelling.     LINES/CATHETERS LABORATORY DATA:  CBC with Differential:    Lab Results   Component Value Date/Time    WBC 6.8 12/11/2022 03:29 PM    RBC 3.54 12/11/2022 03:29 PM    HGB 9.2 12/11/2022 03:29 PM    HCT 30.0 12/11/2022 03:29 PM     12/11/2022 03:29 PM    MCV 84.7 12/11/2022 03:29 PM    MCH 26.0 12/11/2022 03:29 PM    MCHC 30.7 12/11/2022 03:29 PM    RDW 17.0 12/11/2022 03:29 PM    LYMPHOPCT 10.7 12/11/2022 03:29 PM    MONOPCT 11.6 12/11/2022 03:29 PM    BASOPCT 0.3 12/11/2022 03:29 PM    MONOSABS 0.79 12/11/2022 03:29 PM    LYMPHSABS 0.73 12/11/2022 03:29 PM    EOSABS 0.10 12/11/2022 03:29 PM    BASOSABS 0.02 12/11/2022 03:29 PM     CMP:    Lab Results   Component Value Date/Time     12/11/2022 03:29 PM    K 4.2 12/11/2022 03:29 PM     12/11/2022 03:29 PM    CO2 24 12/11/2022 03:29 PM    BUN 16 12/11/2022 03:29 PM    CREATININE 1.2 12/11/2022 03:29 PM    GFRAA >60 01/25/2022 11:10 AM    LABGLOM >60 12/11/2022 03:29 PM    GLUCOSE 108 12/11/2022 03:29 PM    PROT 6.8 12/11/2022 03:29 PM    LABALBU 3.8 12/11/2022 03:29 PM    CALCIUM 8.7 12/11/2022 03:29 PM    BILITOT 0.4 12/11/2022 03:29 PM    ALKPHOS 110 12/11/2022 03:29 PM    AST 22 12/11/2022 03:29 PM    ALT 13 12/11/2022 03:29 PM       ASSESSMENT/PLAN:  New onset atrial fibrillation  Presyncope  Exertional dyspnea  Acute right lower back pain  Ectasia of abdominal aortic aneurysm   Left common iliac artery aneurysm  Chronic stage I diastolic congestive heart failure  Chronic anemia  History of blood clots with IVC filter in place  History of gastric bypass  Hypertension        Patient has multiple issues at play without any clear etiology but possibly secondary to atrial fibrillation. He does complain of shortness of breath with exertion. He has elevated D-dimer however also has IVC filter in place. CTA was not conclusive for DVT however was not an optimal study. Sherry Jurado He was also found to be in atrial fibrillation and denies previous diagnosis of this.   As such patient was placed on Lovenox as well as new echocardiogram has been ordered. Cardiology has been consulted. Additionally he does describe lower back pain. He has a history of low back pain and spinal surgery. PT OT will be consulted. Consider for further imaging.       Yimi LiuThe Dimock Centervannesa  7:27 PM  12/11/2022    Electronically signed by Ivone Lui DO on 12/11/22 at 7:27 PM EST

## 2022-12-12 NOTE — PROGRESS NOTES
Internal Medicine Progress Note    [facility]    Addy Thorndale. Kei Moura., F.ABBIE.ARAOSAURABH. Ashley Holly D.O., COY Valadez D.O. Mami Paetl, MSN, APRN, NP-C  Jazmyn Storm. Aldair Bolaños, MSN, APRN-CNP     Primary Care Physician: Nimesh Pierre DO   Admitting Physician:  Otilia Rene DO  Admission date and time: 12/11/2022  2:28 PM    Room:  19/19  Admitting diagnosis: Dyspnea on exertion [R06.09]    Patient Name: Germán Brock  MRN: [id number]    Date of Service: 12/12/2022     Subjective:  Cr Charles is a 67 y.o. male who was seen and examined today,12/12/2022, at the bedside. We have reviewed his medical and surgical history at length. We reviewed the results of work-up and plan of care moving forward. He is eager for discharge and understands importance of waiting completed work-up. Additional complaints or concerns are reported. No family present during my examination. Review of System:   Constitutional:   Denies fever or chills, weight loss or gain, positive for malaise and fatigue. HEENT:   Denies ear pain, sore throat, sinus or eye problems. Cardiovascular:   Denies any chest pain, palpitation. Denies a known history of atrial fibrillation. Respiratory:   Denies shortness of breath, no exertional dyspnea. Denies coughing, sputum production, hemoptysis, or wheezing. Gastrointestinal:   Denies nausea, vomiting, diarrhea, or constipation. Denies any abdominal pain. Genitourinary:    Denies any urgency, frequency, hematuria. Voiding  without difficulty. Extremities:   Denies lower extremity swelling, edema or cyanosis. Neurology:    Denies any headache or focal neurological deficits, positive for generalized weakness without focal complaint. Psch:   Denies being anxious or depressed. Musculoskeletal:    Denies  myalgias, joint complaints or back pain. Integumentary:   Denies any rashes, ulcers, or excoriations.   Denies bruising. Hematologic/Lymphatic:  Denies bruising or bleeding. Physical Exam:  No intake/output data recorded. No intake or output data in the 24 hours ending 12/12/22 0810No intake/output data recorded. Patient Vitals for the past 96 hrs (Last 3 readings):   Weight   12/11/22 1413 (!) 307 lb (139.3 kg)     Vital Signs:   Blood pressure (!) 147/110, pulse 90, temperature 98 °F (36.7 °C), resp. rate 18, weight (!) 307 lb (139.3 kg), SpO2 98 %. General appearance:  Alert, responsive, oriented to person, place, and time. Chronically ill-appearing, no distress. Head:  Normocephalic. No masses, lesions or tenderness. Eyes:  PERRLA. EOMI. Sclera clear. ENT:  Ears normal. Mucosa normal.  Neck:    Supple. Trachea midline. No thyromegaly. No JVD. No bruits. Heart:    Irregular rhythm with controlled rate, S1 and S2, systolic murmur  Lungs:    Symmetrical. Diminished bibasilar air exchange. Clear to auscultation bilaterally. No wheezes. No rhonchi. No rales. Abdomen:   Soft. Non-tender. Non-distended. Bowel sounds positive. No organomegaly or masses. No pain on palpation. Obese. Extremities:    Peripheral pulses present. No significant pitting peripheral edema. No ulcers. No cyanosis. No clubbing. Neurologic:    Alert x 3. Generally weak without focal deficit. Cranial nerves grossly intact. No focal weakness. Psych:   Behavior is normal. Mood appears normal. Speech is not rapid and/or pressured. Musculoskeletal:   No unilateral joint edema, erythema, or warmth. Gait not assessed. Integumentary:  No rashes  Skin normal color and texture.   Genitalia/Breast:  Deferred    Medication:  Scheduled Meds:   sodium chloride flush  5-40 mL IntraVENous 2 times per day    metoprolol succinate  25 mg Oral Daily    enoxaparin  1 mg/kg SubCUTAneous BID    pantoprazole  40 mg IntraVENous Daily     Continuous Infusions:   dextrose      sodium chloride         Objective Data:  CBC with Differential:    Lab Results Component Value Date/Time    WBC 6.4 12/12/2022 06:40 AM    RBC 3.44 12/12/2022 06:40 AM    HGB 9.0 12/12/2022 06:40 AM    HCT 29.4 12/12/2022 06:40 AM     12/12/2022 06:40 AM    MCV 85.5 12/12/2022 06:40 AM    MCH 26.2 12/12/2022 06:40 AM    MCHC 30.6 12/12/2022 06:40 AM    RDW 17.2 12/12/2022 06:40 AM    LYMPHOPCT 15.8 12/12/2022 06:40 AM    MONOPCT 12.6 12/12/2022 06:40 AM    BASOPCT 0.3 12/12/2022 06:40 AM    MONOSABS 0.81 12/12/2022 06:40 AM    LYMPHSABS 1.01 12/12/2022 06:40 AM    EOSABS 0.20 12/12/2022 06:40 AM    BASOSABS 0.02 12/12/2022 06:40 AM     BMP:    Lab Results   Component Value Date/Time     12/12/2022 06:40 AM    K 3.8 12/12/2022 06:40 AM    K 4.2 12/11/2022 03:29 PM     12/12/2022 06:40 AM    CO2 23 12/12/2022 06:40 AM    BUN 15 12/12/2022 06:40 AM    LABALBU 3.6 12/12/2022 06:40 AM    CREATININE 1.2 12/12/2022 06:40 AM    CALCIUM 8.5 12/12/2022 06:40 AM    GFRAA >60 01/25/2022 11:10 AM    LABGLOM >60 12/12/2022 06:40 AM    GLUCOSE 95 12/12/2022 06:40 AM     Recent Labs     12/11/22  1721   TROPHS 20*       Assessment:  New onset atrial fibrillation with variable ventricular response  Near syncope with component of orthostatic hypotension  Essential hypertension   Acute on chronic low back pain with no red flag symptomatology  Ectatic abdominal aorta without aneurysm  Left common iliac artery aneurysm  Chronic compensated diastolic congestive heart failure  Chronic anemia without overt blood loss  History of venous thromboembolism with IVC filter in place  Morbid obesity with BMI 39.42 kg meter squared and prior Melina-en-Y gastric bypass    Plan:   Dolores De Anda is a 59-year-old male who presented to the emergency department with bouts of near syncope that were associated with position changes. He is on a diuretic chronically and I suspect orthostatic hypotension. Orthostatic vital signs will be assessed.   Also contributing is atrial fibrillation of new onset with variable ventricular response. His exertional dyspnea would suggest an exertionally provoked tachycardia as well. We will hold his home lisinopril and hydrochlorothiazide, introduce metoprolol for rate control. The patient's [id number] at least 2 and he will require anticoagulation moving forward. We will utilize injectable blood thinner medications presently transition to oral upon discharge. Symptomatic and supportive care, PT and OT in the setting of acute on chronic low back pain without injury. Chronic morbidities, labs and vital signs are being monitored and addressed accordingly. Continue current therapy. See orders for further plan of care. Pending results of the work-up, anticipate discharge tomorrow. More than 50% of my  time was spent at the bedside counseling/coordinating care with the patient and/or family with face to face contact. This time was spent reviewing notes and laboratory data as well as instructing and counseling the patient. Time I spent with the family or surrogate(s) is included only if the patient was incapable of providing the necessary information or participating in medical decisions. I also discussed the differential diagnosis and all of the proposed management plans with the patient and individuals accompanying the patient. Abbey Olmstead requires this high level of physician care and nursing on the IMC/Telemetry unit due the complexity of decision management and chance of rapid decline or death. Continued cardiac monitoring and higher level of nursing are required. I am readily available for any further decision-making and intervention. Due to extremely high hospital inpatient census and bed limitations, along with staff shortages, we have had a linsey discussion with the patient/family regarding the likelihood of prolonged ER boarding status and potential delays/disruptions in care related to this.   They understand and agree to maintain hospitalization at this facility while accepting these risks. We have made every attempt to coordinate care with the ER nursing staff as well to avoid any disruptions in care.       ZEUS Haque CNP  12/12/2022  8:10 AM

## 2022-12-12 NOTE — CARE COORDINATION
SS Note: No Covid test. Pt presented for bouts of near syncope & dx w/Dyspnea on exertion & New onset A-fib w/VVR. Pt does have hx venous thromboembolism w/IVC filter in place. Will need oral   anticoagulation @ d/c. Echo schedulred & PT/OT eval.     SW met w/pt in ED 19 for transition of care planning. SW explained role. Pt is  & lives w/spouse in 1 story home. PTA, pt is independent in IADL's/ADL's, drives (works PT) and has insurance. PCP is Judge Misa DO & [facility] is [facility] Pt has following DME: none but did use his father's [employer] last 2 days d/t balance issues. Denies hx of [address] & has past hx of NEIL w/[facility]. No hx of 02. SW completed ACP. Pt notes he has LW & WANTS NO BLOOD PRODUCTS. SW completed FYI- red flag for this. Pt plans on returning home.    Electronically signed by SANTANA Ortega on 12/12/2022 at 12:30 PM

## 2022-12-12 NOTE — PROGRESS NOTES
Physical Therapy    Physical Therapy Initial Evaluation/Plan of Care    Room #:  19/19  Patient Name: Ruddy Wilkes  YOB: 1950  MRN: [id number]    Date of Service: 12/12/2022     Tentative placement recommendation: Outpatient Rehab  Equipment recommendation: None      Evaluating Physical Therapist: Natalie Gerber, PT  #[id number]      Specific Provider Orders/Date/Referring Provider :  12/12/22 0100    PT eval and treat  Start:  12/12/22 0100,   End:  12/12/22 0100,   ONE TIME,   Standing Count:  1 Occurrences,   R         Ester Price,      Admitting Diagnosis:   Dyspnea on exertion [R06.09]    Admitted with    near syncope, shortness of breath ; new back pain following near syncope with no fall or injury  Surgery: none  Visit Diagnoses         Codes    FELICIANO (dyspnea on exertion)    -  Primary R06.09    Near syncope     R55            Patient Active Problem List   Diagnosis    Right cataract    Left cataract    Hypertension    Iron deficiency anemia    Dyspnea on exertion    New onset atrial fibrillation (HCC)        ASSESSMENT of Current Deficits Patient exhibits decreased strength, balance, endurance, and pain low back  impairing functional mobility, transfers, gait , gait distance, and tolerance to activity are barriers to d/c and require skilled intervention to address concerns listed above to increase safety and independence at discharge. Decreased strength, balance and endurance  increases patient's risk for fall.     Pain limiting factor for gait distance and speed      PHYSICAL THERAPY  PLAN OF CARE       Physical therapy plan of care is established based on physician order,  patient diagnosis and clinical assessment    Current Treatment Recommendations:    -Bed Mobility: Lower extremity exercises , Upper extremity exercises , and Trunk control activities   -Standing Balance: Perform strengthening exercises in standing to promote motor control with or without upper extremity support -Transfers: Provide instruction on proper hand and foot position for adequate transfer of weight onto lower extremities and use of gait device if needed and Cues for hand placement, technique and safety. Provide stabilization to prevent fall   -Gait: Gait training and Standing activities to improve: base of support, weight shift, weight bearing    -Endurance: Utilize Supervised activities to increase level of endurance to allow for safe functional mobility including transfers and gait     PT long term treatment goals are located in below grid    Patient and or family understand(s) diagnosis, prognosis, and plan of care. Frequency of treatments: Patient will be seen  daily. Prior Level of Function: Patient ambulated independently    Rehab Potential: good   for baseline    Past medical history:   Past Medical History:   Diagnosis Date    Chronic back pain 2008    swarnoma     Hx of blood clots 2003    after gastric bypass surgery    Hypertension     Iron deficiency anemia 1/26/2022     Past Surgical History:   Procedure Laterality Date    BACK SURGERY  2008    spine-herniated disc    CATARACT REMOVAL WITH IMPLANT Left 03/09/2021    CHOLECYSTECTOMY      COLONOSCOPY      GASTRIC BYPASS SURGERY  2003    HERNIA REPAIR      INTRACAPSULAR CATARACT EXTRACTION Right 06/23/2020    RIGHT EYE CATARACT EMULSIFICATION IOL IMPLANT performed by Cristiane Villarreal MD at [address] [place] [address] Left 3/9/2021    LEFT CATARACT EXTRACTION WITH IOL performed by Cristiane Villarreal MD at [address] [facility]  2002       SUBJECTIVE:    Precautions:  Up with assistance, Check Pulse Oximetry while ambulating , and Orthostatic blood pressure and pulse , falls and alarm ,  covid r/o    Social history: Patient lives with spouse in a ranch home  with No steps  to enter Walk in shower  , built in shower chair      Equipment owned: None,       [address] [facility] Mobility Inpatient   How much difficulty turning over in bed?: A Little  How much difficulty sitting down on / standing up from a chair with arms?: A Little  How much difficulty moving from lying on back to sitting on side of bed?: A Little  How much help from another person moving to and from a bed to a chair?: A Little  How much help from another person needed to walk in hospital room?: A Little  How much help from another person for climbing 3-5 steps with a railing?: A Lot  AM-PAC Inpatient Mobility Raw Score : 17  AM-PAC Inpatient T-Scale Score : 42.13  Mobility Inpatient CMS 0-100% Score: 50.57  Mobility Inpatient CMS G-Code Modifier : CK    Nursing cleared patient for PT evaluation. The admitting diagnosis and active problem list as listed above have been reviewed prior to the initiation of this evaluation. OBJECTIVE;   Initial Evaluation  Date: 12/12/2022 Treatment Date:     Short Term/ Long Term   Goals   Was pt agreeable to Eval/treatment? Yes  To be met in 3 days   Pain level   1/10  At rest  4/10 with standing > 1-2 min     Bed Mobility    Rolling: Supervision     Supine to sit: Supervision     Sit to supine: Supervision     Scooting: Supervision     Rolling: Independent    Supine to sit:  Independent    Sit to supine: Independent    Scooting: Independent     Transfers Sit to stand: Minimal assist of 1 for hand placement for safety  Sit to stand: Independent     Ambulation     3x40 feet using  no device with Supervision    Patient with flexed posture and antalgic gait d/t back pain    150 feet using  no device with Independent    ROM Within functional limits    Increase range of motion 10% of affected joints    Strength BUE:  refer to OT eval  RLE:  5/5  LLE:  5/5  Increase strength in affected mm groups by 1/3 grade   Balance Sitting EOB:  good    Dynamic Standing:  fair    Sitting EOB:  good    Dynamic Standing: good       Patient is Alert & Oriented x person, place, time, and situation and follows directions    Sensation:  Patient  denies numbness/tingling   Edema:  yes bilateral lower extremities   Endurance: fair       Vitals: room air   Blood Pressure at rest  Blood Pressure during session    Heart Rate at rest 71 Heart Rate during session 82   SPO2 at rest 94%  SPO2 during session 94%     Patient education  Patient educated on role of Physical Therapy, risks of immobility, safety and plan of care, importance of positional changes for oxygen exchange,  importance of mobility while in hospital , and safety      Patient response to education:   Pt verbalized understanding Pt demonstrated skill Pt requires further education in this area   Yes Partial Yes      Treatment:  Patient practiced and was instructed/facilitated in the following treatment: Patient    performed  gait and trf training with attention to safety     Therapeutic Exercises:  ankle pumps  x 10 reps. At end of session, patient in wheelchair with nursing present call light and phone within reach,  all lines and tubes intact, nursing notified. Patient would benefit from continued skilled Physical Therapy to improve functional independence and quality of life. Patient's/ family goals   home    Time in  1243  Time out  100    Total Treatment Time  0 minutes    Evaluation time includes thorough review of current medical information, gathering information on past medical history/social history and prior level of function, completion of standardized testing/informal observation of tasks, assessment of data, and development of Plan of care and goals.      CPT codes:  Low Complexity PT evaluation (49026)  No treatment billed    Flavio Pierre, PT

## 2022-12-12 NOTE — ACP (ADVANCE CARE PLANNING)
Advance Care Planning     Advance Care Planning Activator (Inpatient)  Conversation Note      Date of ACP Conversation: 12/12/2022     Conversation Conducted with: Patient with Decision Making Capacity    ACP Activator: Mani Agee, [address] Maker:     Current Designated Health Care Decision Maker:     Primary Decision Maker: Patsy Christina - Spouse - [phone]  Click here to complete [address] including section of the Healthcare Decision Maker Relationship (ie \"Primary\")  Today we documented Decision Maker(s) consistent with Legal Next of Kin hierarchy. Care Preferences    Ventilation: \"If you were in your present state of health and suddenly became very ill and were unable to breathe on your own, what would your preference be about the use of a ventilator (breathing machine) if it were available to you? \"      Would the patient desire the use of ventilator (breathing machine)?: yes    \"If your health worsens and it becomes clear that your chance of recovery is unlikely, what would your preference be about the use of a ventilator (breathing machine) if it were available to you? \"     Would the patient desire the use of ventilator (breathing machine)?: No      Resuscitation  \"CPR works best to restart the heart when there is a sudden event, like a heart attack, in someone who is otherwise healthy. Unfortunately, CPR does not typically restart the heart for people who have serious health conditions or who are very sick. \"    \"In the event your heart stopped as a result of an underlying serious health condition, would you want attempts to be made to restart your heart (answer \"yes\" for attempt to resuscitate) or would you prefer a natural death (answer \"no\" for do not attempt to resuscitate)? \" yes       [] Yes   [x] No   Educated Patient / María Bunch regarding differences between Advance Directives and portable DNR orders.     Length of ACP Conversation in minutes: 11    Conversation Outcomes:  [x] ACP discussion completed  [] Existing advance directive reviewed with patient; no changes to patient's previously recorded wishes  [] New Advance Directive completed  [] Portable Do Not Rescitate prepared for Provider review and signature  [] POLST/POST/MOLST/MOST prepared for Provider review and signature      Follow-up plan:    [] Schedule follow-up conversation to continue planning  [] Referred individual to Provider for additional questions/concerns   [] Advised patient/agent/surrogate to review completed ACP document and update if needed with changes in condition, patient preferences or care setting    [x] This note routed to one or more involved healthcare providers. Pt does have LW and states he WANTS NO BLOOD PRODUCTS.

## 2022-12-13 ENCOUNTER — APPOINTMENT (OUTPATIENT)
Dept: ULTRASOUND IMAGING | Age: 72
DRG: 309 | End: 2022-12-13
Payer: MEDICARE

## 2022-12-13 LAB
ALBUMIN SERPL-MCNC: 3.5 G/DL (ref 3.5–5.2)
ALP BLD-CCNC: 100 U/L (ref 40–129)
ALT SERPL-CCNC: 14 U/L (ref 0–40)
ANION GAP SERPL CALCULATED.3IONS-SCNC: 10 MMOL/L (ref 7–16)
AST SERPL-CCNC: 23 U/L (ref 0–39)
BASOPHILS ABSOLUTE: 0.03 E9/L (ref 0–0.2)
BASOPHILS RELATIVE PERCENT: 0.5 % (ref 0–2)
BILIRUB SERPL-MCNC: 0.4 MG/DL (ref 0–1.2)
BUN BLDV-MCNC: 15 MG/DL (ref 6–23)
CALCIUM SERPL-MCNC: 8.5 MG/DL (ref 8.6–10.2)
CHLORIDE BLD-SCNC: 106 MMOL/L (ref 98–107)
CO2: 24 MMOL/L (ref 22–29)
CREAT SERPL-MCNC: 1.1 MG/DL (ref 0.7–1.2)
EOSINOPHILS ABSOLUTE: 0.36 E9/L (ref 0.05–0.5)
EOSINOPHILS RELATIVE PERCENT: 6.5 % (ref 0–6)
GFR SERPL CREATININE-BSD FRML MDRD: >60 ML/MIN/1.73
GLUCOSE BLD-MCNC: 89 MG/DL (ref 74–99)
HCT VFR BLD CALC: 28.8 % (ref 37–54)
HEMOGLOBIN: 8.8 G/DL (ref 12.5–16.5)
IMMATURE GRANULOCYTES #: 0.02 E9/L
IMMATURE GRANULOCYTES %: 0.4 % (ref 0–5)
LYMPHOCYTES ABSOLUTE: 1.03 E9/L (ref 1.5–4)
LYMPHOCYTES RELATIVE PERCENT: 18.5 % (ref 20–42)
MAGNESIUM: 2.2 MG/DL (ref 1.6–2.6)
MCH RBC QN AUTO: 26.4 PG (ref 26–35)
MCHC RBC AUTO-ENTMCNC: 30.6 % (ref 32–34.5)
MCV RBC AUTO: 86.5 FL (ref 80–99.9)
METER GLUCOSE: 112 MG/DL (ref 74–99)
MONOCYTES ABSOLUTE: 0.65 E9/L (ref 0.1–0.95)
MONOCYTES RELATIVE PERCENT: 11.7 % (ref 2–12)
NEUTROPHILS ABSOLUTE: 3.47 E9/L (ref 1.8–7.3)
NEUTROPHILS RELATIVE PERCENT: 62.4 % (ref 43–80)
PDW BLD-RTO: 17.5 FL (ref 11.5–15)
PHOSPHORUS: 3.1 MG/DL (ref 2.5–4.5)
PLATELET # BLD: 226 E9/L (ref 130–450)
PMV BLD AUTO: 9.8 FL (ref 7–12)
POTASSIUM SERPL-SCNC: 4.5 MMOL/L (ref 3.5–5)
RBC # BLD: 3.33 E12/L (ref 3.8–5.8)
SODIUM BLD-SCNC: 140 MMOL/L (ref 132–146)
TOTAL PROTEIN: 5.9 G/DL (ref 6.4–8.3)
WBC # BLD: 5.6 E9/L (ref 4.5–11.5)

## 2022-12-13 PROCEDURE — 2580000003 HC RX 258: Performed by: INTERNAL MEDICINE

## 2022-12-13 PROCEDURE — 82962 GLUCOSE BLOOD TEST: CPT

## 2022-12-13 PROCEDURE — 6370000000 HC RX 637 (ALT 250 FOR IP): Performed by: INTERNAL MEDICINE

## 2022-12-13 PROCEDURE — 6360000004 HC RX CONTRAST MEDICATION: Performed by: INTERNAL MEDICINE

## 2022-12-13 PROCEDURE — 80053 COMPREHEN METABOLIC PANEL: CPT

## 2022-12-13 PROCEDURE — 1200000000 HC SEMI PRIVATE

## 2022-12-13 PROCEDURE — 97530 THERAPEUTIC ACTIVITIES: CPT

## 2022-12-13 PROCEDURE — 6370000000 HC RX 637 (ALT 250 FOR IP): Performed by: NURSE PRACTITIONER

## 2022-12-13 PROCEDURE — 84100 ASSAY OF PHOSPHORUS: CPT

## 2022-12-13 PROCEDURE — 93970 EXTREMITY STUDY: CPT

## 2022-12-13 PROCEDURE — 83735 ASSAY OF MAGNESIUM: CPT

## 2022-12-13 PROCEDURE — 36415 COLL VENOUS BLD VENIPUNCTURE: CPT

## 2022-12-13 PROCEDURE — C9113 INJ PANTOPRAZOLE SODIUM, VIA: HCPCS | Performed by: INTERNAL MEDICINE

## 2022-12-13 PROCEDURE — 97165 OT EVAL LOW COMPLEX 30 MIN: CPT

## 2022-12-13 PROCEDURE — 6360000002 HC RX W HCPCS: Performed by: INTERNAL MEDICINE

## 2022-12-13 PROCEDURE — C8929 TTE W OR WO FOL WCON,DOPPLER: HCPCS

## 2022-12-13 PROCEDURE — 85025 COMPLETE CBC W/AUTO DIFF WBC: CPT

## 2022-12-13 RX ADMIN — ENOXAPARIN SODIUM 135 MG: 150 INJECTION SUBCUTANEOUS at 09:27

## 2022-12-13 RX ADMIN — PERFLUTREN 1.5 ML: 6.52 INJECTION, SUSPENSION INTRAVENOUS at 09:01

## 2022-12-13 RX ADMIN — Medication 10 ML: at 09:35

## 2022-12-13 RX ADMIN — Medication 10 ML: at 20:46

## 2022-12-13 RX ADMIN — ENOXAPARIN SODIUM 135 MG: 150 INJECTION SUBCUTANEOUS at 20:46

## 2022-12-13 RX ADMIN — METOPROLOL SUCCINATE 25 MG: 25 TABLET, FILM COATED, EXTENDED RELEASE ORAL at 09:28

## 2022-12-13 RX ADMIN — PANTOPRAZOLE SODIUM 40 MG: 40 INJECTION, POWDER, FOR SOLUTION INTRAVENOUS at 09:28

## 2022-12-13 RX ADMIN — ACETAMINOPHEN 650 MG: 325 TABLET ORAL at 23:57

## 2022-12-13 ASSESSMENT — PAIN SCALES - GENERAL: PAINLEVEL_OUTOF10: 3

## 2022-12-13 ASSESSMENT — PAIN DESCRIPTION - LOCATION: LOCATION: BACK

## 2022-12-13 NOTE — PROGRESS NOTES
Internal Medicine Progress Note    [facility]    Jass Joaquin. Sara Lovett, F.A.C.O.I. Virgin Crigler, D.O., MAXINE Lopez, MSN, APRN, NP-C  Varsha Hoyos. Leo Posada, MSN, APRN-CNP     Primary Care Physician: Alistair Miles DO   Admitting Physician:  Onesimo Sher DO  Admission date and time: 12/11/2022  2:28 PM    Room:  [address] [place] [address]  Admitting diagnosis: Dyspnea on exertion [R06.09]  FELICIANO (dyspnea on exertion) [R06.09]  Near syncope [R55]    Patient Name: Sophy Serna  MRN: [id number]    Date of Service: 12/13/2022     Subjective:  Barbara Gonzalez is a 67 y.o. male who was seen and examined today,12/13/2022, at the bedside. We he is feeling better overall. We have reviewed the new results and plan of care moving forward. He understands that additional testing will be required given the conflicting reports of the CTA of the chest and the nuclear medicine perfusion scan. Cardiology consultation is also outstanding. No family present during my examination. Review of System:   Constitutional:   Denies fever or chills, weight loss or gain, positive for malaise and fatigue. HEENT:   Denies ear pain, sore throat, sinus or eye problems. Cardiovascular:   Denies any chest pain, palpitation. Denies a known history of atrial fibrillation. Respiratory:   Denies shortness of breath, no exertional dyspnea. Denies coughing, sputum production, hemoptysis, or wheezing. Gastrointestinal:   Denies nausea, vomiting, diarrhea, or constipation. Denies any abdominal pain. Genitourinary:    Denies any urgency, frequency, hematuria. Voiding  without difficulty. Extremities:   Denies lower extremity swelling, edema or cyanosis. Neurology:    Denies any headache or focal neurological deficits, positive for generalized weakness without focal complaint. Psch:   Denies being anxious or depressed.   Musculoskeletal:    Denies  myalgias, joint complaints or back pain. Integumentary:   Denies any rashes, ulcers, or excoriations. Denies bruising. Hematologic/Lymphatic:  Denies bruising or bleeding. Physical Exam:  No intake/output data recorded. Intake/Output Summary (Last 24 hours) at 12/13/2022 0929  Last data filed at 12/12/2022 1843  Gross per 24 hour   Intake 240 ml   Output 50 ml   Net 190 ml     I/O last 3 completed shifts: In: 240 [P.O.:240]  Out: 48 [Urine:50]  Patient Vitals for the past 96 hrs (Last 3 readings):   Weight   12/11/22 1413 (!) 307 lb (139.3 kg)       Vital Signs:   Blood pressure (!) 123/90, pulse 66, temperature 97.9 °F (36.6 °C), temperature source Oral, resp. rate 19, height 6' 2\" (1.88 m), weight (!) 307 lb (139.3 kg), SpO2 99 %. General appearance:  Alert, responsive, oriented to person, place, and time. Chronically ill-appearing, no distress. Head:  Normocephalic. No masses, lesions or tenderness. Eyes:  PERRLA. EOMI. Sclera clear. ENT:  Ears normal. Mucosa normal.  Neck:    Supple. Trachea midline. No thyromegaly. No JVD. No bruits. Heart:    Irregular rhythm with controlled rate, S1 and S2, systolic murmur. Bedside telemetry reveals atrial fibrillation with controlled ventricular response. Lungs:    Symmetrical. Diminished bibasilar air exchange. Clear to auscultation bilaterally. No wheezes. No rhonchi. No rales. Abdomen:   Soft. Non-tender. Non-distended. Bowel sounds positive. No organomegaly or masses. No pain on palpation. Obese. Extremities:    Peripheral pulses present. No significant pitting peripheral edema. No ulcers. No cyanosis. No clubbing. Neurologic:    Alert x 3. Generally weak without focal deficit. Cranial nerves grossly intact. No focal weakness. Psych:   Behavior is normal. Mood appears normal. Speech is not rapid and/or pressured. Musculoskeletal:   No unilateral joint edema, erythema, or warmth. Gait not assessed.   Integumentary:  No rashes  Skin normal color and texture. Genitalia/Breast:  Deferred    Medication:  Scheduled Meds:   sodium chloride flush  5-40 mL IntraVENous 2 times per day    metoprolol succinate  25 mg Oral Daily    enoxaparin  1 mg/kg SubCUTAneous BID    pantoprazole  40 mg IntraVENous Daily     Continuous Infusions:   dextrose      sodium chloride         Objective Data:  CBC with Differential:    Lab Results   Component Value Date/Time    WBC 5.6 12/13/2022 08:01 AM    RBC 3.33 12/13/2022 08:01 AM    HGB 8.8 12/13/2022 08:01 AM    HCT 28.8 12/13/2022 08:01 AM     12/13/2022 08:01 AM    MCV 86.5 12/13/2022 08:01 AM    MCH 26.4 12/13/2022 08:01 AM    MCHC 30.6 12/13/2022 08:01 AM    RDW 17.5 12/13/2022 08:01 AM    LYMPHOPCT 18.5 12/13/2022 08:01 AM    MONOPCT 11.7 12/13/2022 08:01 AM    BASOPCT 0.5 12/13/2022 08:01 AM    MONOSABS 0.65 12/13/2022 08:01 AM    LYMPHSABS 1.03 12/13/2022 08:01 AM    EOSABS 0.36 12/13/2022 08:01 AM    BASOSABS 0.03 12/13/2022 08:01 AM     BMP:    Lab Results   Component Value Date/Time     12/12/2022 06:40 AM    K 3.8 12/12/2022 06:40 AM    K 4.2 12/11/2022 03:29 PM     12/12/2022 06:40 AM    CO2 23 12/12/2022 06:40 AM    BUN 15 12/12/2022 06:40 AM    LABALBU 3.6 12/12/2022 06:40 AM    CREATININE 1.2 12/12/2022 06:40 AM    CALCIUM 8.5 12/12/2022 06:40 AM    GFRAA >60 01/25/2022 11:10 AM    LABGLOM >60 12/12/2022 06:40 AM    GLUCOSE 95 12/12/2022 06:40 AM     Recent Labs     12/11/22  1721   TROPHS 20*         Assessment:  New onset atrial fibrillation with variable ventricular response  Near syncope with component of orthostatic hypotension  Essential hypertension   D-Dimer elevation greater than threshold for detection with high probability for PE based on nuclear medic however CTA chest excluded PE based on radiology interpretation.    Acute on chronic low back pain with no red flag symptomatology  Ectatic abdominal aorta without aneurysm  Left common iliac artery aneurysm  Chronic compensated diastolic congestive heart failure  Chronic anemia without overt blood loss  History of venous thromboembolism with IVC filter in place  Morbid obesity with BMI 39.42 kg meter squared and prior Melina-en-Y gastric bypass    Plan:   Charu Kinsey is doing better overall. Rate control has been achieved he is tolerating anticoagulation. Cardiology consultation is pending. No further orthostatic symptoms reported. Blood pressure control is improved as well. To note, D-Dimer elevation greater than threshold for detection with high probability for PE based on nuclear medic however CTA chest excluded PE based on radiology interpretation. D-dimer was greater than threshold for detection and he has a history of thromboembolic disease with IVC filter in place. Lower extremity ultrasound be obtained and we will move forward as though patient has thromboembolic event until otherwise determined. We will utilize injectable blood thinner medications presently transition to oral upon discharge. Close been obtained and we await interpretation. Symptomatic and supportive care, PT and OT in the setting of acute on chronic low back pain without injury. Chronic morbidities, labs and vital signs are being monitored and addressed accordingly. Continue current therapy. See orders for further plan of care. Pending results of the work-up, anticipate discharge in the next 24 to 48 hours. More than 50% of my  time was spent at the bedside counseling/coordinating care with the patient and/or family with face to face contact. This time was spent reviewing notes and laboratory data as well as instructing and counseling the patient. Time I spent with the family or surrogate(s) is included only if the patient was incapable of providing the necessary information or participating in medical decisions.  I also discussed the differential diagnosis and all of the proposed management plans with the patient and individuals accompanying the patient. Cr Charles requires this high level of physician care and nursing on the IMC/Telemetry unit due the complexity of decision management and chance of rapid decline or death. Continued cardiac monitoring and higher level of nursing are required. I am readily available for any further decision-making and intervention.        Feng Jaquez, ZEUS - CNP  12/13/2022  9:29 AM

## 2022-12-13 NOTE — H&P (VIEW-ONLY)
CHIEF COMPLAINT: New onset atrial fibrillation    HISTORY OF PRESENT ILLNESS: Patient is a 67 y.o. male   Who is new to our service. He has a history of hypertension, hyperlipidemia, obesity status post gastric bypass surgery, no prior significant cardiac history. He presented to the hospital with complaints of dizziness. He is also complaining of bilateral hip pain and ear fullness. He describes his dizziness as a woozy sensation. It can be positional.  He was found to be in atrial fibrillation with reasonable rate control. I was consulted for further recommendations. Does have a prior history of DVTs and has an IVC filter in place. He was on anticoagulation transiently. Currently, he is feeling better. He tried working with physical therapy today and did have hip pain with associated dizziness. He did complain of some dyspnea at that time. Otherwise he denies any chest pains. He states that he has had an irregular heartbeat for a while. This was based on a home BP device measurement which revealed that he had an irregular heartbeat. However, he has not seen a cardiologist or followed up with his PCP for the same.     Past Medical History:   Diagnosis Date    Chronic back pain 2008    [facility]     Hx of blood clots 2003    after gastric bypass surgery    Hypertension     Iron deficiency anemia 1/26/2022       Patient Active Problem List   Diagnosis    Right cataract    Left cataract    Hypertension    Iron deficiency anemia    Dyspnea on exertion    New onset atrial fibrillation (HCC)       No Known Allergies    Current Facility-Administered Medications   Medication Dose Route Frequency Provider Last Rate Last Admin    glucose chewable tablet 16 g  4 tablet Oral PRN Eliz Gey, DO        dextrose bolus 10% 125 mL  125 mL IntraVENous PRN Eliz Gey, DO        Or    dextrose bolus 10% 250 mL  250 mL IntraVENous PRN Islouie Barragan, DO        glucagon (rDNA) injection 1 mg  1 mg SubCUTAneous PRN Leann Ocala, DO        dextrose 10 % infusion   IntraVENous Continuous PRN Leann Ocala, DO        sodium chloride flush 0.9 % injection 5-40 mL  5-40 mL IntraVENous 2 times per day Leann Ocala, DO   10 mL at 12/13/22 0935    sodium chloride flush 0.9 % injection 5-40 mL  5-40 mL IntraVENous PRN Leann Ocala, DO        0.9 % sodium chloride infusion   IntraVENous PRN Leann Ocala, DO        ondansetron (ZOFRAN-ODT) disintegrating tablet 4 mg  4 mg Oral Q8H PRN Leann Ocala, DO        Or    ondansetron (ZOFRAN) injection 4 mg  4 mg IntraVENous Q6H PRN Leann Ocala, DO        polyethylene glycol (GLYCOLAX) packet 17 g  17 g Oral Daily PRN Leann Ocala, DO        acetaminophen (TYLENOL) tablet 650 mg  650 mg Oral Q6H PRN Leann Ocala, DO   650 mg at 12/12/22 4326    Or    acetaminophen (TYLENOL) suppository 650 mg  650 mg Rectal Q6H PRN Leann Ocala, DO        oxyCODONE-acetaminophen (PERCOCET) 5-325 MG per tablet 1 tablet  1 tablet Oral Q8H PRN Leann Ocala, DO        metoprolol succinate (TOPROL XL) extended release tablet 25 mg  25 mg Oral Daily Leandrew Saris, APRN - CNP   25 mg at 12/13/22 8536    magnesium sulfate 1000 mg in dextrose 5% 100 mL IVPB  1,000 mg IntraVENous PRN Leandrew Saris, APRN - CNP        sodium phosphate 16.8 mmol in sodium chloride 0.9 % 250 mL IVPB  0.16 mmol/kg (Adjusted) IntraVENous PRN Leandrew Saris, APRN - CNP        Or    sodium phosphate 33.6 mmol in sodium chloride 0.9 % 250 mL IVPB  0.32 mmol/kg (Adjusted) IntraVENous PRN Leandrew Saris, APRN - CNP        potassium chloride (KLOR-CON M) extended release tablet 40 mEq  40 mEq Oral PRN Leandrew Saris, APRN - CNP        Or    potassium bicarb-citric acid (EFFER-K) effervescent tablet 40 mEq  40 mEq Oral PRN Leandrew Saris, APRN - CNP        Or    potassium chloride 10 mEq/100 mL IVPB (Peripheral Line)  10 mEq IntraVENous PRN Leandrew Saris, APRN - CNP        enoxaparin (LOVENOX) injection 135 mg  1 mg/kg SubCUTAneous BID Oswaldo Shin, DO   135 mg at 12/13/22 8773    pantoprazole (PROTONIX) injection 40 mg  40 mg IntraVENous Daily Oswaldo Shin, DO   40 mg at 12/13/22 2433       Social History     Socioeconomic History    Marital status:      Spouse name: Not on file    Number of children: Not on file    Years of education: Not on file    Highest education level: Not on file   Occupational History    Not on file   Tobacco Use    Smoking status: Never    Smokeless tobacco: Never   Vaping Use    Vaping Use: Never used   Substance and Sexual Activity    Alcohol use: Yes     Alcohol/week: 1.0 standard drink     Types: 1 Glasses of wine per week     Comment: 1 glass of wine daily    Drug use: No    Sexual activity: Not on file   Other Topics Concern    Not on file   Social History Narrative    Not on file     Social Determinants of Health     Financial Resource Strain: Low Risk     Difficulty of Paying Living Expenses: Not hard at all   Food Insecurity: No Food Insecurity    Worried About Running Out of Food in the Last Year: Never true    Ran Out of Food in the Last Year: Never true   Transportation Needs: Not on file   Physical Activity: Not on file   Stress: Not on file   Social Connections: Not on file   Intimate Partner Violence: Not on file   Housing Stability: Not on file       History reviewed. No pertinent family history. Review of Systems:   Heart: as above   Lungs: as above   Eyes: denies changes in vision or discharge. Ears: denies changes in hearing or pain. Nose: denies epistaxis or masses   Throat: denies sore throat or trouble swallowing. Neuro: denies numbness, tingling, tremors. Skin: denies rashes or itching. : denies hematuria, dysuria   GI: denies vomiting, diarrhea   Psych: denies mood changed, anxiety, depression. all others negative.     Physical Exam   BP (!) 123/90   Pulse 66   Temp 97.9 °F (36.6 °C) (Oral)   Resp 19   Ht 6' 2\" (1.88 m)   Wt (!) 307 lb (139.3 kg)   SpO2 99%   BMI 39.42 kg/m²   Constitutional: Oriented to person, place, and time. Well-developed and well-nourished. No distress. Head: Normocephalic and atraumatic. Eyes: EOM are normal. Pupils are equal, round, and reactive to light. Neck: Normal range of motion. Neck supple. JVP difficult to assess. Positive hepatojugular reflux. Carotid bruit is not present. No tracheal deviation present. No thyromegaly present. Cardiovascular: Normal rate with an irregularly irregular rhythm, normal heart sounds and intact distal pulses. Exam reveals no gallop and no friction rub. No murmur heard. Pulmonary/Chest: Effort normal and breath sounds normal. No respiratory distress. No wheezes. No rales. No tenderness. Abdominal: Soft. Bowel sounds are normal. No distension and no mass. No tenderness. No rebound and no guarding. Musculoskeletal: Normal range of motion. No edema and no tenderness. Lymphadenopathy:   No cervical adenopathy. No groin adenopathy. Neurological: Alert and oriented to person, place, and time. Skin: Skin is warm and dry. No rash noted. Not diaphoretic. No erythema. Psychiatric: Normal mood and affect.  Behavior is normal.     CBC:   Lab Results   Component Value Date/Time    WBC 5.6 12/13/2022 08:01 AM    RBC 3.33 12/13/2022 08:01 AM    HGB 8.8 12/13/2022 08:01 AM    HCT 28.8 12/13/2022 08:01 AM    MCV 86.5 12/13/2022 08:01 AM    MCH 26.4 12/13/2022 08:01 AM    MCHC 30.6 12/13/2022 08:01 AM    RDW 17.5 12/13/2022 08:01 AM     12/13/2022 08:01 AM    MPV 9.8 12/13/2022 08:01 AM     BMP:   Lab Results   Component Value Date/Time     12/13/2022 08:01 AM    K 4.5 12/13/2022 08:01 AM    K 4.2 12/11/2022 03:29 PM     12/13/2022 08:01 AM    CO2 24 12/13/2022 08:01 AM    BUN 15 12/13/2022 08:01 AM    LABALBU 3.5 12/13/2022 08:01 AM    CREATININE 1.1 12/13/2022 08:01 AM    CALCIUM 8.5 12/13/2022 08:01 AM    GFRAA >60 01/25/2022 11:10 AM    LABGLOM >60 12/13/2022 08:01 AM     Magnesium:    Lab Results   Component Value Date/Time    MG 2.2 12/13/2022 08:01 AM     Cardiac Enzymes:   Lab Results   Component Value Date    TROPHS 20 (H) 12/11/2022    TROPHS 17 (H) 12/11/2022      PT/INR:    Lab Results   Component Value Date/Time    PROTIME 14.3 12/12/2022 04:31 PM    INR 1.2 12/12/2022 04:31 PM     TSH:    Lab Results   Component Value Date/Time    TSH 1.870 12/12/2022 06:40 AM       Rhythm Strip: Reviewed. Shows atrial fibrillation with rates in the 100s    EKG: Reviewed independently by me. Shows atrial fibrillation with left axis deviation. IVCD. Echo  2017  2D echo is of reduced quality due to a poor acoustic window. Moderate left ventricular concentric hypertrophy noted. Measured ejection fraction is 73 %. Normal left ventricular ejection fraction. There is doppler evidence of stage I diastolic dysfunction. The left atrium is mild-moderately dilated. Mildly dilated right ventricle. Moderately enlarged right atrium size. Trace mitral regurgitation is present. Trace tricuspid regurgitation. There is borderline pulmonary hypertension. Borderline dilated aortic root. Echocardiogram from 12/13/2022:   Mildly dilated left ventricular chamber size. Normal left ventricular systolic function. Moderate concentric LVH. Visually estimated LVEF is 55-60 %. No wall motion abnormalities. Diastolic pattern consistent with atrial fibrillation. Normal right ventricle structure and function. The left atrium is moderately dilated. Moderately enlarged right atrium size. Mild-moderate mitral regurgitation is present. Likely normal estimated PA pressure. Technically difficult examination. Definity contrast used for LV   opacification.     ASSESSMENT AND PLAN:  New onset atrial fibrillation with variable ventricular response  Near syncope with component of orthostatic hypotension  Essential hypertension   Acute on chronic low back pain Ectatic abdominal aorta without aneurysm  Left common iliac artery aneurysm  Heart failure with preserved ejection fraction: Hypervolemic on examination  Chronic anemia without overt blood loss  History of venous thromboembolism with IVC filter in place  Morbid obesity with BMI 39.42 kg meter squared and prior Melina-en-Y gastric bypass    Recommendations:  He has been initiated on Toprol-XL for rate control. Rates appear reasonably controlled currently. He is currently on therapeutic Lovenox. Recommend transitioning to Eliquis when able. Ultrasound done today suggestive of femoral DVTs. He will need an outpatient referral to sleep clinic to rule out obstructive sleep apnea. I will plan on a rhythm control strategy with him with a JERROD guided cardioversion. I will arrange for this as an outpatient. He does appear mildly hypervolemic on examination. Recommend discharging on Demadex 20 mg daily. Continue his home medications including  Cozaar and hydrochlorothiazide. Check orthostatic vital signs prior to discharge. Defer management of other issues including hip pain to primary team.  Counseled about compliance with diet, exercise and weight loss. Kike Nobles MD, [facility]     NOTE: This report was transcribed using voice recognition software. Every effort was made to ensure accuracy; however, inadvertent computerized transcription errors may be present.

## 2022-12-13 NOTE — CARE COORDINATION
12/13/22 1418 CM note: COVID (-) 12/12/22. Room air. New onset afib on ER. Cardiology consulted. Ddimer >5250, CT chest (-) DVT. Echo completed today, US BLE done today- pending. Pt currently on lovenox. Discharge plan is home. Therapy is recommending outpt therapy and pt is agreeable to this. WILL NEED SCRIPT FOR OUTPT PT/OT WITH DX WRITTEN ON SCRIPT. CM will watch for possible anticoagulant at discharge. Pts family will provide transportation.  Electronically signed by Allison Chapin RN on 12/13/2022 at 2:25 PM

## 2022-12-13 NOTE — CONSULTS
CHIEF COMPLAINT: New onset atrial fibrillation    HISTORY OF PRESENT ILLNESS: Patient is a 67 y.o. male   Who is new to our service. He has a history of hypertension, hyperlipidemia, obesity status post gastric bypass surgery, no prior significant cardiac history. He presented to the hospital with complaints of dizziness. He is also complaining of bilateral hip pain and ear fullness. He describes his dizziness as a woozy sensation. It can be positional.  He was found to be in atrial fibrillation with reasonable rate control. I was consulted for further recommendations. Does have a prior history of DVTs and has an IVC filter in place. He was on anticoagulation transiently. Currently, he is feeling better. He tried working with physical therapy today and did have hip pain with associated dizziness. He did complain of some dyspnea at that time. Otherwise he denies any chest pains. He states that he has had an irregular heartbeat for a while. This was based on a home BP device measurement which revealed that he had an irregular heartbeat. However, he has not seen a cardiologist or followed up with his PCP for the same.     Past Medical History:   Diagnosis Date    [facility]     Hx of blood clots 2003    after gastric bypass surgery    Hypertension     Iron deficiency anemia 1/26/2022       Patient Active Problem List   Diagnosis    Right cataract    Left cataract    Hypertension    Iron deficiency anemia    Dyspnea on exertion    New onset atrial fibrillation (HCC)       No Known Allergies    Current Facility-Administered Medications   Medication Dose Route Frequency Provider Last Rate Last Admin    glucose chewable tablet 16 g  4 tablet Oral PRN Beula Coca, DO        dextrose bolus 10% 125 mL  125 mL IntraVENous PRN Beula Coca, DO        Or    dextrose bolus 10% 250 mL  250 mL IntraVENous PRN Ismail U Yung, DO        glucagon (rDNA) injection 1 mg  1 mg SubCUTAneous PRN Millicent Jero, DO        dextrose 10 % infusion   IntraVENous Continuous PRN Millicent Jero, DO        sodium chloride flush 0.9 % injection 5-40 mL  5-40 mL IntraVENous 2 times per day Millicent Jero, DO   10 mL at 12/13/22 0935    sodium chloride flush 0.9 % injection 5-40 mL  5-40 mL IntraVENous PRN Millicent Jero, DO        0.9 % sodium chloride infusion   IntraVENous PRN Millicent Jero, DO        ondansetron (ZOFRAN-ODT) disintegrating tablet 4 mg  4 mg Oral Q8H PRN Millicent Jero, DO        Or    ondansetron (ZOFRAN) injection 4 mg  4 mg IntraVENous Q6H PRN Millicent Jero, DO        polyethylene glycol (GLYCOLAX) packet 17 g  17 g Oral Daily PRN Millicent Jero, DO        acetaminophen (TYLENOL) tablet 650 mg  650 mg Oral Q6H PRN Millicent Jero, DO   650 mg at 12/12/22 1967    Or    acetaminophen (TYLENOL) suppository 650 mg  650 mg Rectal Q6H PRN Mililcent Jero, DO        oxyCODONE-acetaminophen (PERCOCET) 5-325 MG per tablet 1 tablet  1 tablet Oral Q8H PRN Millicent Jero, DO        metoprolol succinate (TOPROL XL) extended release tablet 25 mg  25 mg Oral Daily Nasim Hiss, APRN - CNP   25 mg at 12/13/22 4552    magnesium sulfate 1000 mg in dextrose 5% 100 mL IVPB  1,000 mg IntraVENous PRN Nasim Hiss, APRN - CNP        sodium phosphate 16.8 mmol in sodium chloride 0.9 % 250 mL IVPB  0.16 mmol/kg (Adjusted) IntraVENous PRN Nasim Hiss, APRN - CNP        Or    sodium phosphate 33.6 mmol in sodium chloride 0.9 % 250 mL IVPB  0.32 mmol/kg (Adjusted) IntraVENous PRN Nasim Hiss, APRN - CNP        potassium chloride (KLOR-CON M) extended release tablet 40 mEq  40 mEq Oral PRN Nasim Hiss, APRN - CNP        Or    potassium bicarb-citric acid (EFFER-K) effervescent tablet 40 mEq  40 mEq Oral PRN Nasim Hiss, APRN - CNP        Or    potassium chloride 10 mEq/100 mL IVPB (Peripheral Line)  10 mEq IntraVENous PRN Nasim Hiss, APRN - CNP        enoxaparin (LOVENOX) injection 135 mg  1 mg/kg SubCUTAneous BID Lorrin Karen, DO   135 mg at 12/13/22 9909    pantoprazole (PROTONIX) injection 40 mg  40 mg IntraVENous Daily Lorrin Karen DO   40 mg at 12/13/22 2380       Social History     Socioeconomic History    Marital status:      Spouse name: Not on file    Number of children: Not on file    Years of education: Not on file    Highest education level: Not on file   Occupational History    Not on file   Tobacco Use    Smoking status: Never    Smokeless tobacco: Never   Vaping Use    Vaping Use: Never used   Substance and Sexual Activity    Alcohol use: Yes     Alcohol/week: 1.0 standard drink     Types: 1 Glasses of wine per week     Comment: 1 glass of wine daily    Drug use: No    Sexual activity: Not on file   Other Topics Concern    Not on file   Social History Narrative    Not on file     Social Determinants of Health     Financial Resource Strain: Low Risk     Difficulty of Paying Living Expenses: Not hard at all   Food Insecurity: No Food Insecurity    Worried About Running Out of Food in the Last Year: Never true    Ran Out of Food in the Last Year: Never true   Transportation Needs: Not on file   Physical Activity: Not on file   Stress: Not on file   Social Connections: Not on file   Intimate Partner Violence: Not on file   Housing Stability: Not on file       History reviewed. No pertinent family history. Review of Systems:   Heart: as above   Lungs: as above   Eyes: denies changes in vision or discharge. Ears: denies changes in hearing or pain. Nose: denies epistaxis or masses   Throat: denies sore throat or trouble swallowing. Neuro: denies numbness, tingling, tremors. Skin: denies rashes or itching. : denies hematuria, dysuria   GI: denies vomiting, diarrhea   Psych: denies mood changed, anxiety, depression. all others negative.     Physical Exam   BP (!) 123/90   Pulse 66   Temp 97.9 °F (36.6 °C) (Oral)   Resp 19   Ht 6' 2\" (1.88 m)   Wt (!) 307 lb (139.3 kg)   SpO2 99%   BMI 39.42 kg/m²   Constitutional: Oriented to person, place, and time. Well-developed and well-nourished. No distress. Head: Normocephalic and atraumatic. Eyes: EOM are normal. Pupils are equal, round, and reactive to light. Neck: Normal range of motion. Neck supple. JVP difficult to assess. Positive hepatojugular reflux. Carotid bruit is not present. No tracheal deviation present. No thyromegaly present. Cardiovascular: Normal rate with an irregularly irregular rhythm, normal heart sounds and intact distal pulses. Exam reveals no gallop and no friction rub. No murmur heard. Pulmonary/Chest: Effort normal and breath sounds normal. No respiratory distress. No wheezes. No rales. No tenderness. Abdominal: Soft. Bowel sounds are normal. No distension and no mass. No tenderness. No rebound and no guarding. Musculoskeletal: Normal range of motion. No edema and no tenderness. Lymphadenopathy:   No cervical adenopathy. No groin adenopathy. Neurological: Alert and oriented to person, place, and time. Skin: Skin is warm and dry. No rash noted. Not diaphoretic. No erythema. Psychiatric: Normal mood and affect.  Behavior is normal.     CBC:   Lab Results   Component Value Date/Time    WBC 5.6 12/13/2022 08:01 AM    RBC 3.33 12/13/2022 08:01 AM    HGB 8.8 12/13/2022 08:01 AM    HCT 28.8 12/13/2022 08:01 AM    MCV 86.5 12/13/2022 08:01 AM    MCH 26.4 12/13/2022 08:01 AM    MCHC 30.6 12/13/2022 08:01 AM    RDW 17.5 12/13/2022 08:01 AM     12/13/2022 08:01 AM    MPV 9.8 12/13/2022 08:01 AM     BMP:   Lab Results   Component Value Date/Time     12/13/2022 08:01 AM    K 4.5 12/13/2022 08:01 AM    K 4.2 12/11/2022 03:29 PM     12/13/2022 08:01 AM    CO2 24 12/13/2022 08:01 AM    BUN 15 12/13/2022 08:01 AM    LABALBU 3.5 12/13/2022 08:01 AM    CREATININE 1.1 12/13/2022 08:01 AM    CALCIUM 8.5 12/13/2022 08:01 AM    GFRAA >60 01/25/2022 11:10 AM    LABGLOM >60 12/13/2022 08:01 AM     Magnesium:    Lab Results   Component Value Date/Time    MG 2.2 12/13/2022 08:01 AM     Cardiac Enzymes:   Lab Results   Component Value Date    TROPHS 20 (H) 12/11/2022    TROPHS 17 (H) 12/11/2022      PT/INR:    Lab Results   Component Value Date/Time    PROTIME 14.3 12/12/2022 04:31 PM    INR 1.2 12/12/2022 04:31 PM     TSH:    Lab Results   Component Value Date/Time    TSH 1.870 12/12/2022 06:40 AM       Rhythm Strip: Reviewed. Shows atrial fibrillation with rates in the 100s    EKG: Reviewed independently by me. Shows atrial fibrillation with left axis deviation. IVCD. Echo  2017  2D echo is of reduced quality due to a poor acoustic window. Moderate left ventricular concentric hypertrophy noted. Measured ejection fraction is 73 %. Normal left ventricular ejection fraction. There is doppler evidence of stage I diastolic dysfunction. The left atrium is mild-moderately dilated. Mildly dilated right ventricle. Moderately enlarged right atrium size. Trace mitral regurgitation is present. Trace tricuspid regurgitation. There is borderline pulmonary hypertension. Borderline dilated aortic root. Echocardiogram from 12/13/2022:   Mildly dilated left ventricular chamber size. Normal left ventricular systolic function. Moderate concentric LVH. Visually estimated LVEF is 55-60 %. No wall motion abnormalities. Diastolic pattern consistent with atrial fibrillation. Normal right ventricle structure and function. The left atrium is moderately dilated. Moderately enlarged right atrium size. Mild-moderate mitral regurgitation is present. Likely normal estimated PA pressure. Technically difficult examination. Definity contrast used for LV   opacification.     ASSESSMENT AND PLAN:  New onset atrial fibrillation with variable ventricular response  Near syncope with component of orthostatic hypotension  Essential hypertension   Acute on chronic low back pain Ectatic abdominal aorta without aneurysm  Left common iliac artery aneurysm  Heart failure with preserved ejection fraction: Hypervolemic on examination  Chronic anemia without overt blood loss  History of venous thromboembolism with IVC filter in place  Morbid obesity with BMI 39.42 kg meter squared and prior Melina-en-Y gastric bypass    Recommendations:  He has been initiated on Toprol-XL for rate control. Rates appear reasonably controlled currently. He is currently on therapeutic Lovenox. Recommend transitioning to Eliquis when able. Ultrasound done today suggestive of femoral DVTs. He will need an outpatient referral to sleep clinic to rule out obstructive sleep apnea. I will plan on a rhythm control strategy with him with a JERROD guided cardioversion. I will arrange for this as an outpatient. He does appear mildly hypervolemic on examination. Recommend discharging on Demadex 20 mg daily. Continue his home medications including  Cozaar and hydrochlorothiazide. Check orthostatic vital signs prior to discharge. Defer management of other issues including hip pain to primary team.  Counseled about compliance with diet, exercise and weight loss. Dorothy Walter MD, [facility]     NOTE: This report was transcribed using voice recognition software. Every effort was made to ensure accuracy; however, inadvertent computerized transcription errors may be present.

## 2022-12-13 NOTE — PROGRESS NOTES
[address] [facility]  [address] [place]        Date:2022                                                  Patient Name: Renzo Dixon    MRN: [id number]    : 1950    Room: [address] [place] [address]      Evaluating OT: Sharon Bruce OTR/L #[id number]     Referring Provider and Specific Provider Orders/Date:      22  OT eval and treat  Start:  22,   End:  22,   ONE TIME,   Standing Count:  1 Occurrences,   R         Nasim Morales, APRN - CNP      Placement Recommendation: Home with OP rehab services vs Home        Diagnosis:   1. FELICIANO (dyspnea on exertion)    2.  Near syncope         Surgery: None       Pertinent Medical History:       Past Medical History:   Diagnosis Date    Chronic back pain     swarnoma     Hx of blood clots 2003    after gastric bypass surgery    Hypertension     Iron deficiency anemia 2022         Past Surgical History:   Procedure Laterality Date    BACK SURGERY  2008    spine-herniated disc    CATARACT REMOVAL WITH IMPLANT Left 2021    CHOLECYSTECTOMY      COLONOSCOPY      GASTRIC BYPASS SURGERY  2003    HERNIA REPAIR      INTRACAPSULAR CATARACT EXTRACTION Right 2020    RIGHT EYE CATARACT EMULSIFICATION IOL IMPLANT performed by Samantha Negro MD at [address] [place] [address] Left 3/9/2021    LEFT CATARACT EXTRACTION WITH IOL performed by Samantha Negro MD at [address]          Precautions:  Fall Risk, up with assistance, chronic back pain, dyspnea on exertion      Assessment of current deficits    [x] Functional mobility  [x]ADLs  [x] Strength               []Cognition    [x] Functional transfers   [x] IADLs         [] Safety Awareness   [x]Endurance    [] Fine Coordination              [x] Balance      [] Vision/perception   []Sensation     []Gross Motor Coordination  [] ROM  [] Delirium                   [] Motor Control     OT PLAN OF CARE   OT POC based on physician orders, patient diagnosis and results of clinical assessment    Frequency/Duration 1-3 days/wk for 2 weeks PRN     Specific OT Treatment Interventions to include:   * Instruction/training on adapted ADL techniques and AE recommendations to increase functional independence within precautions       * Training on energy conservation strategies, correct breathing pattern and techniques to improve independence/tolerance for self-care routine  * Functional transfer/mobility training/DME recommendations for increased independence, safety, and fall prevention  * Patient/Family education to increase follow through with safety techniques and functional independence  * Recommendation of environmental modifications for increased safety with functional transfers/mobility and ADLs  * Therapeutic exercise to improve motor endurance, ROM, and functional strength for ADLs/functional transfers  * Therapeutic activities to facilitate/challenge dynamic balance, stand tolerance for increased safety and independence with ADLs    Recommended Adaptive Equipment: TBD      Home Living: Lives with spouse, single family home, 1 story, No steps to enter. Bathroom set-up: walk-in shower with built-in seats, elevated commode. Equipment owned: wheeled walker     Prior Level of Function: Independent with ADLs , Independent with IADLs; ambulated independently. Driving: yes   Occupation: works PT      Pain Level: pt reported back pain, chronic vs acute, and worsening with mobility; Nursing notified.       Cognition: A&O: 4/4; Follows 3 step directions   Memory: intact   Sequencing: intact   Problem solving: intact   Judgement/safety: intact     [facility]   AM-PAC Daily Activity Inpatient   How much help for putting on and taking off regular lower body clothing?: A Little  How much help for Bathing?: A Little  How much help for Toileting?: A Little  How much help for putting on and taking off regular upper body clothing?: A Little  How much help for taking care of personal grooming?: A Little  How much help for eating meals?: A Little  AM-PAC Inpatient Daily Activity Raw Score: 18  AM-PAC Inpatient ADL T-Scale Score : 38.66  ADL Inpatient CMS 0-100% Score: 46.65  ADL Inpatient CMS G-Code Modifier : CK     Functional Assessment:    Initial Eval Status  Date: 12/13/22   Treatment Status  Date: STGs = LTGs  Time frame: 10-14 days   Feeding Supervision     Independent    Grooming Stand by Assist     Independent    UB Dressing Stand by Assist    Independent    LB Dressing Minimal Assist     Independent    Bathing Minimal Assist     Independent    Toileting Minimal Assist    Independent    Bed Mobility  Supine to sit: Supervision   Sit to supine: Supervision     Supine to sit: Independent   Sit to supine: Independent    Functional Transfers Sit to stand: Supervision   Stand to sit: Supervision     Transfer training with verbal cues for hand placement throughout session to improve safety. Independent    Functional Mobility Minimal Assist to improve balance greater than household distances, verbal cues for overall safety.      Independent    Balance Sitting:     Static: good    Dynamic: good  Standing: fair/fair plus     Sitting:     Static: good    Dynamic: good  Standing: good    Activity Tolerance fair  plus   Increase standing tolerance >3  minutes for improved engagement with functional transfers and indep in ADLs     Visual/  Perceptual Glasses: yes     Reports changes in vision since admission: no      NA      Hand Dominance:      AROM (PROM) Strength Additional Info:  Goal:   RUE  WFL 4-/5 good  and wfl FMC/dexterity noted during ADL tasks   Improve overall RUE strength  for participation in functional tasks   LUE WFL 4-/5 good  and wfl FMC/dexterity noted during ADL tasks   Improve overall LUE strength  for participation in functional tasks     Hearing:  [facility]   Sensation:   No c/o numbness or tingling  Tone:  WFL   Edema:      Vitals:  HR at rest: 80 bpm HR with activity:  bpm HR at end of session: 71 bpm   SpO2 at rest: 99% SpO2 with activity: 98% SpO2 at end of session: 98%   BP at rest:  BP with activity:  BP at end of session:      Comments: RN cleared patient for OT. Upon arrival patient in supine. Therapist facilitated and instructed pt on adapted  techniques & compensatory strategies to improve safety and independence with basic ADLs, bed mobility, functional transfers and mobility to allow pt to achieve highest level of independence and safely. Pt demonstrated fair plus understanding of education & follow through. At end of session, patient was in supine with call light and phone within reach, all lines and tubes intact. Overall, patient demonstrated  decreased independence and safety during completion of ADL tasks. Pt would benefit from continued skilled OT to increase safety and independence with completion of ADL tasks and functional mobility for improved quality of life. Rehab Potential: Good for established goals. Patient / Family Goal: return home       Patient and/or family were instructed on functional diagnosis, prognosis/goals and OT plan of care. Demonstrated good understanding.      Eval Complexity: Low    Time In: 9:48 AM   Time Out: 10:08 AM    Total Treatment Time: 0       Min Units   OT Eval Low 97165  X  1    OT Eval Medium 20275      OT Eval High 13968      OT Re-Eval [id number]            ADL/Self Care 78214     Therapeutic Activities 30661       Therapeutic Ex 86825       Orthotic Management 46814       Manual 94499     Neuro Re-Ed 56360       Non-Billable Time        Evaluation Time additionally includes thorough review of current medical information, gathering information on past medical history/social history and prior level of function, interpretation of standardized testing/informal observation of tasks, assessment of data and development of plan of care and goals.         Evaluating OT: Loretta Verdugo OTR/L #[id number]

## 2022-12-13 NOTE — PROGRESS NOTES
Physical Therapy    Physical Therapy Treatment Note/Plan of Care    Room #:  2293/6352-19  Patient Name: Barrington Vargas  YOB: 1950  MRN: [id number]    Date of Service: 12/13/2022     Tentative placement recommendation: Outpatient Rehab  Equipment recommendation: None      Evaluating Physical Therapist: Beba Orozco, PT  #[id number]      Specific Provider Orders/Date/Referring Provider :  12/12/22 0100    PT eval and treat  Start:  12/12/22 0100,   End:  12/12/22 0100,   ONE TIME,   Standing Count:  1 Occurrences,   R         Kacy Elders, DO     Admitting Diagnosis:   Dyspnea on exertion [R06.09]  FELICIANO (dyspnea on exertion) [R06.09]  Near syncope [R55]    Admitted with    near syncope, shortness of breath ; new back pain following near syncope with no fall or injury  Surgery: none  Visit Diagnoses         Codes    FELICIANO (dyspnea on exertion)    -  Primary R06.09    Near syncope     R55            Patient Active Problem List   Diagnosis    Right cataract    Left cataract    Hypertension    Iron deficiency anemia    Dyspnea on exertion    New onset atrial fibrillation (HCC)        ASSESSMENT of Current Deficits Patient exhibits decreased strength, balance, endurance, and pain low back  impairing functional mobility, transfers, gait , gait distance, and tolerance to activity are barriers to d/c and require skilled intervention to address concerns listed above to increase safety and independence at discharge. Decreased strength, balance and endurance  increases patient's risk for fall. Pain limiting factor for gait distance and tolerance to further function. Patient reports inc in back pain with standing and walking. Patient reaches out for objects when walking for balance and demonstrates lateral sway with ambulation.        PHYSICAL THERAPY  PLAN OF CARE       Physical therapy plan of care is established based on physician order,  patient diagnosis and clinical assessment    Current Treatment Recommendations:    -Bed Mobility: Lower extremity exercises , Upper extremity exercises , and Trunk control activities   -Standing Balance: Perform strengthening exercises in standing to promote motor control with or without upper extremity support   -Transfers: Provide instruction on proper hand and foot position for adequate transfer of weight onto lower extremities and use of gait device if needed and Cues for hand placement, technique and safety. Provide stabilization to prevent fall   -Gait: Gait training and Standing activities to improve: base of support, weight shift, weight bearing    -Endurance: Utilize Supervised activities to increase level of endurance to allow for safe functional mobility including transfers and gait     PT long term treatment goals are located in below grid    Patient and or family understand(s) diagnosis, prognosis, and plan of care. Frequency of treatments: Patient will be seen  daily. Prior Level of Function: Patient ambulated independently    Rehab Potential: good   for baseline    Past medical history:   Past Medical History:   Diagnosis Date    Chronic back pain 2008    swarnoma     Hx of blood clots 2003    after gastric bypass surgery    Hypertension     Iron deficiency anemia 1/26/2022     Past Surgical History:   Procedure Laterality Date    BACK SURGERY  2008    spine-herniated disc    CATARACT REMOVAL WITH IMPLANT Left 03/09/2021    CHOLECYSTECTOMY      COLONOSCOPY      GASTRIC BYPASS SURGERY  2003    HERNIA REPAIR      INTRACAPSULAR CATARACT EXTRACTION Right 06/23/2020    RIGHT EYE CATARACT EMULSIFICATION IOL IMPLANT performed by Alise Colby MD at [address] [place] [address] Left 3/9/2021    LEFT CATARACT EXTRACTION WITH IOL performed by Alise Colby MD at [address] [place] [address]  2002       SUBJECTIVE:    Precautions:  Up with assistance, Check Pulse Oximetry while ambulating , and Orthostatic blood pressure and pulse , falls and alarm ,  covid r/o    Social history: Patient lives with spouse in a ranch home  with No steps  to enter Walk in shower  , built in shower chair      Equipment owned: None,       [address]   How much difficulty turning over in bed?: None  How much difficulty sitting down on / standing up from a chair with arms?: A Little  How much difficulty moving from lying on back to sitting on side of bed?: None  How much help from another person moving to and from a bed to a chair?: A Little  How much help from another person needed to walk in hospital room?: A Little  How much help from another person for climbing 3-5 steps with a railing?: A Lot  AM-PAC Inpatient Mobility Raw Score : 19  AM-PAC Inpatient T-Scale Score : 45.44  Mobility Inpatient CMS 0-100% Score: 41.77  Mobility Inpatient CMS G-Code Modifier : CK    Nursing cleared patient for PT treatment. OBJECTIVE;   Initial Evaluation  Date: 12/12/2022 Treatment Date:   12/13/2022    Short Term/ Long Term   Goals   Was pt agreeable to Eval/treatment? Yes yes To be met in 3 days   Pain level   1/10  At rest  4/10 with standing > 1-2 min 0/10 at rest, pain inc with standing, no number assigned    Bed Mobility    Rolling: Supervision     Supine to sit: Supervision     Sit to supine: Supervision     Scooting: Supervision    Rolling: Independent   Supine to sit: Independent   Sit to supine: Not assessed patient seated edge of bed   Scooting: Independent    Rolling: Independent    Supine to sit:  Independent    Sit to supine: Independent    Scooting: Independent     Transfers Sit to stand: Minimal assist of 1 for hand placement for safety Sit to stand: Supervision     Sit to stand: Independent     Ambulation     3x40 feet using  no device with Supervision    Patient with flexed posture and antalgic gait d/t back pain 2x40 feet using  no device with Supervision    for balance, Patient with lateral sway, and cues for safety and pacing  Patient reaches for objects   150 feet using  no device with Independent    ROM Within functional limits    Increase range of motion 10% of affected joints    Strength BUE:  refer to OT eval  RLE:  5/5  LLE:  5/5  Increase strength in affected mm groups by 1/3 grade   Balance Sitting EOB:  good    Dynamic Standing:  fair   Sitting EOB: good   Dynamic Standing: fair    Sitting EOB:  good    Dynamic Standing: good       Patient is Alert & Oriented x person, place, time, and situation and follows directions    Sensation:  Patient  denies numbness/tingling   Edema:  yes bilateral lower extremities   Endurance: fair       Vitals: room air   Blood Pressure at rest  Blood Pressure during session    Heart Rate at rest  Heart Rate during session    SPO2 at rest %  SPO2 during session %     Patient education  Patient educated on role of Physical Therapy, risks of immobility, safety and plan of care, importance of positional changes for oxygen exchange,  importance of mobility while in hospital , and safety      Patient response to education:   Pt verbalized understanding Pt demonstrated skill Pt requires further education in this area   Yes Partial Yes      Treatment:  Patient practiced and was instructed/facilitated in the following treatment: Patient transferred to edge of bed and stood to [facility] in hallway. Patient returned to room and back to sit edge of bed. Therapeutic Exercises:  not performed      At end of session, patient sitting edge of bed with     call light and phone within reach,  all lines and tubes intact, nursing notified. Patient would benefit from continued skilled Physical Therapy to improve functional independence and quality of life.          Patient's/ family goals   home    Time in  323  Time out  336    Total Treatment Time  13 minutes    CPT codes:  Therapeutic activities (84318)   13 minutes  1 unit(s)    Mirella Hernandez, PTA #[id number]

## 2022-12-13 NOTE — PLAN OF CARE
Problem: Discharge Planning  Goal: Discharge to home or other facility with appropriate resources  12/13/2022 1016 by Abraham Hawk RN  Outcome: Progressing  Flowsheets (Taken 12/13/2022 0810)  Discharge to home or other facility with appropriate resources: Identify barriers to discharge with patient and caregiver  12/13/2022 0106 by Varun Bundy RN  Outcome: Progressing     Problem: Safety - Adult  Goal: Free from fall injury  12/13/2022 1016 by Abraham Hawk RN  Outcome: Progressing  12/13/2022 0106 by Varun Bundy RN  Outcome: Progressing

## 2022-12-14 VITALS
SYSTOLIC BLOOD PRESSURE: 141 MMHG | TEMPERATURE: 98.8 F | HEART RATE: 66 BPM | OXYGEN SATURATION: 99 % | BODY MASS INDEX: 39.4 KG/M2 | WEIGHT: 307 LBS | DIASTOLIC BLOOD PRESSURE: 89 MMHG | HEIGHT: 74 IN | RESPIRATION RATE: 18 BRPM

## 2022-12-14 PROBLEM — E66.01 MORBID OBESITY (HCC): Status: ACTIVE | Noted: 2022-12-14

## 2022-12-14 PROBLEM — I82.419 ACUTE DEEP VEIN THROMBOSIS (DVT) OF FEMORAL VEIN (HCC): Status: ACTIVE | Noted: 2022-12-14

## 2022-12-14 PROBLEM — I50.33 ACUTE ON CHRONIC HEART FAILURE WITH PRESERVED EJECTION FRACTION (HCC): Status: ACTIVE | Noted: 2022-12-14

## 2022-12-14 LAB
ALBUMIN SERPL-MCNC: 3.5 G/DL (ref 3.5–5.2)
ALP BLD-CCNC: 98 U/L (ref 40–129)
ALT SERPL-CCNC: 16 U/L (ref 0–40)
ANION GAP SERPL CALCULATED.3IONS-SCNC: 12 MMOL/L (ref 7–16)
AST SERPL-CCNC: 28 U/L (ref 0–39)
BASOPHILS ABSOLUTE: 0.03 E9/L (ref 0–0.2)
BASOPHILS RELATIVE PERCENT: 0.6 % (ref 0–2)
BILIRUB SERPL-MCNC: 0.4 MG/DL (ref 0–1.2)
BUN BLDV-MCNC: 16 MG/DL (ref 6–23)
CALCIUM SERPL-MCNC: 8.4 MG/DL (ref 8.6–10.2)
CHLORIDE BLD-SCNC: 106 MMOL/L (ref 98–107)
CO2: 22 MMOL/L (ref 22–29)
CREAT SERPL-MCNC: 1.3 MG/DL (ref 0.7–1.2)
EOSINOPHILS ABSOLUTE: 0.3 E9/L (ref 0.05–0.5)
EOSINOPHILS RELATIVE PERCENT: 5.8 % (ref 0–6)
GFR SERPL CREATININE-BSD FRML MDRD: 58 ML/MIN/1.73
GLUCOSE BLD-MCNC: 94 MG/DL (ref 74–99)
HCT VFR BLD CALC: 28.4 % (ref 37–54)
HEMOGLOBIN: 8.7 G/DL (ref 12.5–16.5)
IMMATURE GRANULOCYTES #: 0.01 E9/L
IMMATURE GRANULOCYTES %: 0.2 % (ref 0–5)
LYMPHOCYTES ABSOLUTE: 1.2 E9/L (ref 1.5–4)
LYMPHOCYTES RELATIVE PERCENT: 23.3 % (ref 20–42)
MAGNESIUM: 2.1 MG/DL (ref 1.6–2.6)
MCH RBC QN AUTO: 26.5 PG (ref 26–35)
MCHC RBC AUTO-ENTMCNC: 30.6 % (ref 32–34.5)
MCV RBC AUTO: 86.6 FL (ref 80–99.9)
MONOCYTES ABSOLUTE: 0.64 E9/L (ref 0.1–0.95)
MONOCYTES RELATIVE PERCENT: 12.4 % (ref 2–12)
NEUTROPHILS ABSOLUTE: 2.97 E9/L (ref 1.8–7.3)
NEUTROPHILS RELATIVE PERCENT: 57.7 % (ref 43–80)
PDW BLD-RTO: 18.1 FL (ref 11.5–15)
PHOSPHORUS: 3.2 MG/DL (ref 2.5–4.5)
PLATELET # BLD: 260 E9/L (ref 130–450)
PMV BLD AUTO: 9.5 FL (ref 7–12)
POTASSIUM SERPL-SCNC: 3.9 MMOL/L (ref 3.5–5)
RBC # BLD: 3.28 E12/L (ref 3.8–5.8)
SODIUM BLD-SCNC: 140 MMOL/L (ref 132–146)
TOTAL PROTEIN: 6.1 G/DL (ref 6.4–8.3)
WBC # BLD: 5.2 E9/L (ref 4.5–11.5)

## 2022-12-14 PROCEDURE — 2580000003 HC RX 258: Performed by: INTERNAL MEDICINE

## 2022-12-14 PROCEDURE — 84100 ASSAY OF PHOSPHORUS: CPT

## 2022-12-14 PROCEDURE — 85025 COMPLETE CBC W/AUTO DIFF WBC: CPT

## 2022-12-14 PROCEDURE — 83735 ASSAY OF MAGNESIUM: CPT

## 2022-12-14 PROCEDURE — 80053 COMPREHEN METABOLIC PANEL: CPT

## 2022-12-14 PROCEDURE — C9113 INJ PANTOPRAZOLE SODIUM, VIA: HCPCS | Performed by: INTERNAL MEDICINE

## 2022-12-14 PROCEDURE — 36415 COLL VENOUS BLD VENIPUNCTURE: CPT

## 2022-12-14 PROCEDURE — 6370000000 HC RX 637 (ALT 250 FOR IP): Performed by: NURSE PRACTITIONER

## 2022-12-14 PROCEDURE — 6360000002 HC RX W HCPCS: Performed by: INTERNAL MEDICINE

## 2022-12-14 RX ORDER — TORSEMIDE 20 MG/1
20 TABLET ORAL
Qty: 30 TABLET | Refills: 0 | Status: SHIPPED | OUTPATIENT
Start: 2022-12-15

## 2022-12-14 RX ORDER — METOPROLOL SUCCINATE 25 MG/1
25 TABLET, EXTENDED RELEASE ORAL DAILY
Qty: 30 TABLET | Refills: 1 | Status: SHIPPED | OUTPATIENT
Start: 2022-12-15

## 2022-12-14 RX ORDER — LOSARTAN POTASSIUM AND HYDROCHLOROTHIAZIDE 25; 100 MG/1; MG/1
TABLET ORAL
Qty: 90 TABLET | Refills: 0 | Status: SHIPPED | OUTPATIENT
Start: 2022-12-14

## 2022-12-14 RX ADMIN — METOPROLOL SUCCINATE 25 MG: 25 TABLET, FILM COATED, EXTENDED RELEASE ORAL at 09:56

## 2022-12-14 RX ADMIN — PANTOPRAZOLE SODIUM 40 MG: 40 INJECTION, POWDER, FOR SOLUTION INTRAVENOUS at 09:56

## 2022-12-14 RX ADMIN — Medication 10 ML: at 09:57

## 2022-12-14 RX ADMIN — ENOXAPARIN SODIUM 135 MG: 150 INJECTION SUBCUTANEOUS at 09:56

## 2022-12-14 ASSESSMENT — PAIN SCALES - GENERAL: PAINLEVEL_OUTOF10: 0

## 2022-12-14 NOTE — PROGRESS NOTES
INPATIENT CARDIOLOGY FOLLOW-UP    Name: Sanam Pimentel    Age: 67 y.o. Date of Admission: 12/11/2022  2:28 PM    Date of Service: 12/14/2022    Primary Cardiologist: New to me from this admission    Chief Complaint: Follow-up for new onset atrial fibrillation    Interim History:  No new overnight cardiac complaints. Currently with no complaints of CP, SOB, palpitations, dizziness, or lightheadedness. Atrial fibrillation with reasonable rate control on telemetry.     No complaints of dizziness  Review of Systems:   Negative except as described above    Problem List:  Patient Active Problem List   Diagnosis    Right cataract    Left cataract    Hypertension    Iron deficiency anemia    Dyspnea on exertion    New onset atrial fibrillation (HCC)       Current Medications:    Current Facility-Administered Medications:     glucose chewable tablet 16 g, 4 tablet, Oral, PRN, Ismail U Yung, DO    dextrose bolus 10% 125 mL, 125 mL, IntraVENous, PRN **OR** dextrose bolus 10% 250 mL, 250 mL, IntraVENous, PRN, Ismail U Yung, DO    glucagon (rDNA) injection 1 mg, 1 mg, SubCUTAneous, PRN, Ismail U Yung, DO    dextrose 10 % infusion, , IntraVENous, Continuous PRN, Ismail U Yung, DO    sodium chloride flush 0.9 % injection 5-40 mL, 5-40 mL, IntraVENous, 2 times per day, [address], DO, 10 mL at 12/14/22 0957    sodium chloride flush 0.9 % injection 5-40 mL, 5-40 mL, IntraVENous, PRN, Ismail U Yung, DO    0.9 % sodium chloride infusion, , IntraVENous, PRN, [address], DO    ondansetron (ZOFRAN-ODT) disintegrating tablet 4 mg, 4 mg, Oral, Q8H PRN **OR** ondansetron (ZOFRAN) injection 4 mg, 4 mg, IntraVENous, Q6H PRN, Ismail U Yung, DO    polyethylene glycol (GLYCOLAX) packet 17 g, 17 g, Oral, Daily PRN, [address], DO    acetaminophen (TYLENOL) tablet 650 mg, 650 mg, Oral, Q6H PRN, 650 mg at 12/13/22 8877 **OR** acetaminophen (TYLENOL) suppository 650 mg, 650 mg, Rectal, Q6H PRN, [address], DO    oxyCODONE-acetaminophen (PERCOCET) 5-325 MG per tablet 1 tablet, 1 tablet, Oral, Q8H PRN, Ismail U Yung, DO    metoprolol succinate (TOPROL XL) extended release tablet 25 mg, 25 mg, Oral, Daily, ZEUS Mccurdy CNP, 25 mg at 12/14/22 0956    magnesium sulfate 1000 mg in dextrose 5% 100 mL IVPB, 1,000 mg, IntraVENous, PRN, ZEUS Mccurdy CNP    sodium phosphate 16.8 mmol in sodium chloride 0.9 % 250 mL IVPB, 0.16 mmol/kg (Adjusted), IntraVENous, PRN **OR** sodium phosphate 33.6 mmol in sodium chloride 0.9 % 250 mL IVPB, 0.32 mmol/kg (Adjusted), IntraVENous, PRN, ZEUS Mccurdy CNP    potassium chloride (KLOR-CON M) extended release tablet 40 mEq, 40 mEq, Oral, PRN **OR** potassium bicarb-citric acid (EFFER-K) effervescent tablet 40 mEq, 40 mEq, Oral, PRN **OR** potassium chloride 10 mEq/100 mL IVPB (Peripheral Line), 10 mEq, IntraVENous, PRN, ZEUS Mccurdy CNP    enoxaparin (LOVENOX) injection 135 mg, 1 mg/kg, SubCUTAneous, BID, Ismail U Yung, DO, 135 mg at 12/14/22 0956    pantoprazole (PROTONIX) injection 40 mg, 40 mg, IntraVENous, Daily, Lorrin Elders, DO, 40 mg at 12/14/22 9646    Physical Exam:  BP (!) 141/89   Pulse 66   Temp 98.8 °F (37.1 °C) (Oral)   Resp 18   Ht 6' 2\" (1.88 m)   Wt (!) 307 lb (139.3 kg)   SpO2 99%   BMI 39.42 kg/m²   Wt Readings from Last 3 Encounters:   12/11/22 (!) 307 lb (139.3 kg)   01/25/22 (!) 307 lb (139.3 kg)   03/09/21 (!) 342 lb (155.1 kg)     Appearance: Awake, alert, no acute respiratory distress  Skin: Intact, no rash  Head: Normocephalic, atraumatic  Eyes: EOMI, no conjunctival erythema  ENMT: No pharyngeal erythema, MMM, no rhinorrhea  Neck: Supple, no elevated JVP, no carotid bruits  Lungs: Clear to auscultation bilaterally. No wheezes, rales, or rhonchi.   Cardiac: PMI nondisplaced, normal rate with an irregularly irregular rhythm, S1 & S2 normal, no murmurs  Abdomen: Soft, nontender, +bowel sounds  Extremities: Moves all extremities x 4, no lower extremity edema  Neurologic: No focal motor deficits apparent, normal mood and affect  Peripheral Pulses: Intact posterior tibial pulses bilaterally    Intake/Output:    Intake/Output Summary (Last 24 hours) at 12/14/2022 1240  Last data filed at 12/14/2022 1046  Gross per 24 hour   Intake 300 ml   Output --   Net 300 ml     I/O this shift:  In: 180 [P.O.:180]  Out: -     Laboratory Tests:  Recent Labs     12/12/22  0640 12/13/22  0801 12/14/22  0657    140 140   K 3.8 4.5 3.9    106 106   CO2 23 24 22   BUN 15 15 16   CREATININE 1.2 1.1 1.3*   GLUCOSE 95 89 94   CALCIUM 8.5* 8.5* 8.4*     Lab Results   Component Value Date/Time    MG 2.1 12/14/2022 06:57 AM     Recent Labs     12/12/22  0640 12/13/22  0801 12/14/22  0657   ALKPHOS 106 100 98   ALT 13 14 16   AST 20 23 28   PROT 6.5 5.9* 6.1*   BILITOT 0.7 0.4 0.4   LABALBU 3.6 3.5 3.5     Recent Labs     12/12/22  0640 12/13/22  0801 12/14/22  0657   WBC 6.4 5.6 5.2   RBC 3.44* 3.33* 3.28*   HGB 9.0* 8.8* 8.7*   HCT 29.4* 28.8* 28.4*   MCV 85.5 86.5 86.6   MCH 26.2 26.4 26.5   MCHC 30.6* 30.6* 30.6*   RDW 17.2* 17.5* 18.1*    226 260   MPV 9.5 9.8 9.5     No results found for: CKTOTAL, CKMB, CKMBINDEX, TROPONINI  Lab Results   Component Value Date    INR 1.2 12/12/2022    PROTIME 14.3 (H) 12/12/2022     Lab Results   Component Value Date    TSH 1.870 12/12/2022     Lab Results   Component Value Date    LABA1C 5.3 12/12/2022     No results found for: EAG  Lab Results   Component Value Date    CHOL 112 12/12/2022    CHOL 132 01/25/2022    CHOL 148 05/27/2016     Lab Results   Component Value Date    TRIG 53 12/12/2022    TRIG 72 01/25/2022    TRIG 48 05/27/2016     Lab Results   Component Value Date    HDL 40 12/12/2022    HDL 38 01/25/2022    HDL 51 05/27/2016     Lab Results   Component Value Date    LDLCALC 61 12/12/2022    LDLCALC 80 01/25/2022    LDLCALC 87 05/27/2016     Lab Results   Component Value Date    LABVLDL 11 12/12/2022    LABVLDL 14 01/25/2022    LABVLDL 10 05/27/2016     No results found for: CHOLHDLRATIO  Recent Labs     12/11/22  1529   PROBNP 1,272*       Cardiac Tests:    Echo  2017  2D echo is of reduced quality due to a poor acoustic window. Moderate left ventricular concentric hypertrophy noted. Measured ejection fraction is 73 %. Normal left ventricular ejection fraction. There is doppler evidence of stage I diastolic dysfunction. The left atrium is mild-moderately dilated. Mildly dilated right ventricle. Moderately enlarged right atrium size. Trace mitral regurgitation is present. Trace tricuspid regurgitation. There is borderline pulmonary hypertension. Borderline dilated aortic root. Echocardiogram from 12/13/2022:   Mildly dilated left ventricular chamber size. Normal left ventricular systolic function. Moderate concentric LVH. Visually estimated LVEF is 55-60 %. No wall motion abnormalities. Diastolic pattern consistent with atrial fibrillation. Normal right ventricle structure and function. The left atrium is moderately dilated. Moderately enlarged right atrium size. Mild-moderate mitral regurgitation is present. Likely normal estimated PA pressure. Technically difficult examination. Definity contrast used for LV   opacification. ASSESSMENT AND PLAN:  New onset atrial fibrillation with variable ventricular response  Near syncope with component of orthostatic hypotension  Essential hypertension   Acute on chronic low back pain   Ectatic abdominal aorta without aneurysm  Left common iliac artery aneurysm  Heart failure with preserved ejection fraction: Hypervolemic on examination  Chronic anemia without overt blood loss  History of venous thromboembolism with IVC filter in place  Morbid obesity with BMI 39.42 kg meter squared and prior Melina-en-Y gastric bypass     Recommendations:  He has been initiated on Toprol-XL for rate control.   Rates appear reasonably controlled currently. Therapeutic Lovenox has been transitioned to Eliquis. Ultrasound suggestive of new DVTs. He will need an outpatient referral to sleep clinic to rule out obstructive sleep apnea. I will plan on a rhythm control strategy with him with a JERROD guided cardioversion. I will arrange for this as an outpatient. He does appear mildly hypervolemic on examination. Recommend discharging on Demadex 20 mg daily. Continue his home medications including  Cozaar and hydrochlorothiazide. Check orthostatic vital signs prior to discharge. Defer management of other issues including hip pain to primary team.  Counseled about compliance with diet, exercise and weight loss. Derrick Eason MD, [facility]    NOTE: This report was transcribed using voice recognition software. Every effort was made to ensure accuracy; however, inadvertent computerized transcription errors may be present.

## 2022-12-14 NOTE — CARE COORDINATION
12/14/22 1115 CM note: COVID (-) 12/12/22. Room air. US RLE (+) DVT, eliquis started. Discharge order noted. Discharge plan is home. Plan for pt to f/u with PCP regarding possible c-scope and cardiology for outpt cardioversion. Once those issues are taken care of, if pt still needs outpt therapy he is to f/u with his PCP for script. Pt acknowledges understanding of this. Free 30 day eliquis card provided to patient. Pts wife will provide transportation home.  Electronically signed by Lior Rendon RN on 12/14/2022 at 11:20 AM

## 2022-12-15 ENCOUNTER — TELEPHONE (OUTPATIENT)
Dept: PRIMARY CARE CLINIC | Age: 72
End: 2022-12-15

## 2022-12-15 NOTE — DISCHARGE SUMMARY
[address]                               DISCHARGE SUMMARY    PATIENT NAME: Alfredo LEIVA                    :        1950  MED REC NO:   [id number]                            ROOM:         ACCOUNT NO:   [de-identified]                           ADMIT DATE: 2022  PROVIDER:     Qing Duckworth DO                  DISCHARGE DATE:  2022    ADMITTING DIAGNOSIS:  Atrial fibrillation. FINAL DISCHARGE DIAGNOSES:  New-onset atrial fibrillation with variable  ventricular response, and near syncope with component of orthostatic  hypotension. SECONDARY DISCHARGE DIAGNOSES:  Essential hypertension; D-dimer  elevation greater than threshold for detection, high probability for  pulmonary embolism based upon nuclear medic, however, CTA excluded PE  based on radiology interpretation; acute on chronic low back pain;  ectatic abdominal aorta without aneurysm; left common iliac artery  aneurysm; chronic compensated diastolic congestive heart failure;  chronic anemia without overt blood loss with associated microcytosis;  history of thromboembolism with IVC filter in place; morbid obesity with  elevated BMI of 11.52.    COMPLICATIONS:  No complications. OPERATIONS:  No operations. CONSULTATIONS:  Obtained with [address] [place] [address] Cardiology. No OMT was given. ADMITTING PHYSICIANS:  Dr. Cecily Leyva and Dr. Gunnar Durant. CHIEF COMPLAINT AND HISTORY OF CHIEF COMPLAINT:  This is a pleasant  58-year-old white male who was admitted to [address] [place] [address]. The  patient was admitted to the hospital here on 2022. The patient  presented to the hospital here with what appeared to be low back pain  along with shortness of breath. The patient did have an episode of near  syncope which occurred on Friday. The patient had sudden onset by  lightheadedness and blackening of his vision.   The patient states his  hearing became somewhat muffled. He presented to the hospital, was seen  and evaluated. We noted at that time that the patient did have evidence  of atrial fibrillation with rapid ventricular response. He was admitted  to the hospital at this time. 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 PHYSICAL EXAMINATION:  VITAL SIGNS:  Showed blood pressure to be 137/81, pulse 75, respirations  18, and O2 saturation 99%. GENERAL APPEARANCE:  This is a 48-year-old white male. He is alert,  responsive, and oriented to person, place and time. HEENT:  Normal.  NECK:  With adequate carotid upstrokes without bruits. Trachea midline. Thyroid not palpable. LUNGS:  Clear. HEART:  Fairly regular without ectopy. ABDOMEN:  Soft. EXTREMITIES:  Without any cyanosis, clubbing or edema. LABORATORY DATA:  While the patient was here in the hospital, he had  following laboratory studies performed:  Hemoglobin and hematocrit 9.2  and 30.0 respectively, white count 6000, platelet count adequate. BUN  and creatinine were 16 and 1.2 respectively. HOSPITAL COURSE:  The patient was admitted to the hospital to the  intermediate care unit. The patient at this time presented to the  hospital here with what appeared to be a new-onset atrial fibrillation,  and consultation was obtained from [facility]. Beta blocker was  in place for rate control and anticoagulant therapy. The patient was  seen by [facility], adjustments were made in medications. For the  most part, the patient clinically improved. Optimal care was given. The patient was anxious to be discharged home on 12/14/2022. Labs at  the time of discharge on 12/14/2022 revealed the following:  His BUN and  creatinine were 16 and 1.3 respectively. Electrolytes were  satisfactory.   CMP was normal.  CBC showed stable hemoglobin and  hematocrit of 8.7 and 28.4 respectively, white count was 5000, platelet  count adequate. His echocardiogram has reviewed by [facility],  did show an EF of 55-60%, mild to moderate mitral regurgitation was  noted. Technically, a difficult study. The patient's vital signs at  this time did show a blood pressure to be 141/89, temperature 98.8,  pulse 66, respirations 18, O2 saturations 99%. Height 6 feet 2 inches  and weight 307 pounds. The patient was discharged home on Eliquis 10 mg b.i.d. for seven days,  then 5 mg b.i.d.; metoprolol XL 25 mg daily; Demadex 20 mg tri-weekly; [id number]/25 mg half tablet daily. The patient does have evidence on his lab work of what appeared to be a  microcytosis and the anemia. I would recommend the patient undergo a  colonoscopic examination. The patient did state that he might have had  Cologuard test in the past, which was positive. This definitely needed  to be followed up accordingly. He does complain of some low back pain  with a prior history of MI, should be followed up accordingly. The  patient is going to follow up with [facility]. Arrangements were  made for outpatient sleep study along with possible JERROD and DC  cardioversion. The patient is recommended weight reduction along with  behavioral modification and appropriate followup accordingly. The  patient will follow up with his primary care doctor Dr. Lavenia Sandifer in one  week. More than 40 minutes was spent on the day of discharge with more than  50% of the time was spent face-to-face with the patient discussing plan  of care and treatment at this time.         Nereyda Magdlaeno DO    D: 12/14/2022 15:31:39       T: 12/15/2022 6:47:19     [email or web address]  Job#: [id number]     Doc#: [id number]    CC:

## 2022-12-15 NOTE — TELEPHONE ENCOUNTER
Care Transitions Initial Follow Up Call    Outreach made within 2 business days of discharge: Yes    Patient: Ginny Berrios Patient : 1950   MRN: [id number]  Reason for Admission: There are no discharge diagnoses documented for the most recent discharge. Discharge Date: 22       Spoke with: Patient     Discharge department/facility: [facility]     TCM Interactive Patient Contact:  Was patient able to fill all prescriptions: Yes  Was patient instructed to bring all medications to the follow-up visit: Yes  Is patient taking all medications as directed in the discharge summary?  Yes  Does patient understand their discharge instructions: Yes  Does patient have questions or concerns that need addressed prior to 7-14 day follow up office visit: no    Scheduled appointment with PCP within 7-14 days    Follow Up  Future Appointments   Date Time Provider [place]   2022  2:00 PM Colletta Saliva [facility]   2023  1:00 PM [place], [place]

## 2022-12-16 ENCOUNTER — TELEPHONE (OUTPATIENT)
Dept: CARDIOLOGY CLINIC | Age: 72
End: 2022-12-16

## 2022-12-16 ENCOUNTER — TELEPHONE (OUTPATIENT)
Dept: ADMINISTRATIVE | Age: 72
End: 2022-12-16

## 2022-12-16 LAB — VITAMIN B2: 5 NMOL/L (ref 5–50)

## 2022-12-16 NOTE — TELEPHONE ENCOUNTER
Patient scheduled 12-21-22 10 am  for JERROD/ Cardioversion instructions  given arrival 8:00 am     No prior Auth required     Patient is vaccinated

## 2022-12-16 NOTE — TELEPHONE ENCOUNTER
Pt called to schedule hospital follow up w/Dr Morales Friday; discharged 12/14 new onset AFIB, [facility].  Please call him w/appt  [phone]

## 2022-12-19 ENCOUNTER — HOSPITAL ENCOUNTER (OUTPATIENT)
Dept: PREADMISSION TESTING | Age: 72
Discharge: HOME | End: 2022-12-19

## 2022-12-19 VITALS — HEIGHT: 74 IN | BODY MASS INDEX: 35.68 KG/M2 | WEIGHT: 278 LBS

## 2022-12-19 NOTE — PROGRESS NOTES
[facility] PRE-ADMISSION TESTING INSTRUCTIONS    The Preadmission Testing patient is instructed accordingly using the following criteria (check applicable):    ARRIVAL INSTRUCTIONS:  [x] Parking the day of Surgery is located in the Main Entrance lot. Upon entering the door, make an immediate right to the surgery reception desk    [x] Bring photo ID and insurance card    [x] Bring in a copy of Living will or Durable Power of  papers. Please be sure to arrange for responsible a[x]dult to provide transportation to and from the hospital    [x] Please arrange for responsible adult to be with you for the 24 hour period post procedure due to having anesthesia    [x] If you awake am of surgery not feeling well or have temperature >100 please call [phone]    GENERAL INSTRUCTIONS:    [x] Nothing by mouth after midnight, including gum, candy, mints or water    [x] You may brush your teeth, but do not swallow any water    [x] Take medications as instructed with 1-2 oz of water    [x] Stop herbal supplements and vitamins 5 days prior to procedure    [x] Follow preop dosing of blood thinners per physician instructions    [] Take 1/2 dose of evening insulin, but no insulin after midnight    [] No oral diabetic medications after midnight    [] If diabetic and have low blood sugar or feel symptomatic, take 1-2oz apple juice only    [] Bring inhalers day of surgery    [] Bring C-PAP/ Bi-Pap day of surgery    [] Bring urine specimen day of surgery    [x] Shower or bath with soap, lather and rinse well, AM of Surgery, no lotion, powders or creams to surgical site    [] Follow bowel prep as instructed per surgeon    [x] No tobacco products within 24 hours of surgery     [x] No alcohol or illegal drug use within 24 hours of surgery.     [x] Jewelry, body piercing's, eyeglasses, contact lenses and dentures are not permitted into surgery (bring cases)      [x] Please do not wear any nail polish, make up or hair products on the day of surgery    [x] You can expect a call the business day prior to procedure to notify you if your arrival time changes    [x] If you receive a survey after surgery we would greatly appreciate your comments    [] Parent/guardian of a minor must accompany their child and remain on the premises  the entire time they are under our care     [] Pediatric patients may bring favorite toy, blanket or comfort item with them    [] A caregiver or family member must remain with the patient during their stay if they are mentally handicapped, have dementia, disoriented or unable to use a call light or would be a safety concern if left unattended    [x] Please notify surgeon if you develop any illness between now and time of surgery (cold, cough, sore throat, fever, nausea, vomiting) or any signs of infections  including skin, wounds, and dental.    [x]  The Outpatient Pharmacy is available to fill your prescription here on your day of surgery, ask your preop nurse for details    [] Other instructions    EDUCATIONAL MATERIALS PROVIDED:    [] PAT Preoperative Education Packet/Booklet     [] Medication List    [] Transfusion bracelet applied with instructions    [] Shower with soap, lather and rinse well, and use CHG wipes provided the evening before surgery as instructed    [] Incentive spirometer with instructions

## 2022-12-21 ENCOUNTER — ANESTHESIA EVENT (OUTPATIENT)
Dept: NON INVASIVE DIAGNOSTICS | Age: 72
End: 2022-12-21

## 2022-12-21 ENCOUNTER — HOSPITAL ENCOUNTER (OUTPATIENT)
Dept: NON INVASIVE DIAGNOSTICS | Age: 72
Discharge: HOME | End: 2022-12-21
Payer: MEDICARE

## 2022-12-21 ENCOUNTER — ANESTHESIA (OUTPATIENT)
Dept: NON INVASIVE DIAGNOSTICS | Age: 72
End: 2022-12-21

## 2022-12-21 VITALS
TEMPERATURE: 97.1 F | RESPIRATION RATE: 18 BRPM | HEART RATE: 68 BPM | SYSTOLIC BLOOD PRESSURE: 106 MMHG | HEIGHT: 74 IN | BODY MASS INDEX: 35.68 KG/M2 | WEIGHT: 278 LBS | DIASTOLIC BLOOD PRESSURE: 58 MMHG | OXYGEN SATURATION: 100 %

## 2022-12-21 DIAGNOSIS — I72.3 ILIAC ARTERY ANEURYSM (HCC): Primary | ICD-10-CM

## 2022-12-21 LAB
EKG ATRIAL RATE: 60 BPM
EKG P AXIS: 34 DEGREES
EKG P-R INTERVAL: 234 MS
EKG Q-T INTERVAL: 440 MS
EKG QRS DURATION: 98 MS
EKG QTC CALCULATION (BAZETT): 440 MS
EKG R AXIS: -30 DEGREES
EKG T AXIS: 3 DEGREES
EKG VENTRICULAR RATE: 60 BPM

## 2022-12-21 PROCEDURE — 6360000002 HC RX W HCPCS: Performed by: NURSE ANESTHETIST, CERTIFIED REGISTERED

## 2022-12-21 PROCEDURE — 7100000010 HC PHASE II RECOVERY - FIRST 15 MIN

## 2022-12-21 PROCEDURE — 93320 DOPPLER ECHO COMPLETE: CPT

## 2022-12-21 PROCEDURE — 6360000002 HC RX W HCPCS

## 2022-12-21 PROCEDURE — 3700000001 HC ADD 15 MINUTES (ANESTHESIA)

## 2022-12-21 PROCEDURE — 2580000003 HC RX 258: Performed by: NURSE ANESTHETIST, CERTIFIED REGISTERED

## 2022-12-21 PROCEDURE — 92960 CARDIOVERSION ELECTRIC EXT: CPT

## 2022-12-21 PROCEDURE — 93325 DOPPLER ECHO COLOR FLOW MAPG: CPT

## 2022-12-21 PROCEDURE — 3700000000 HC ANESTHESIA ATTENDED CARE

## 2022-12-21 PROCEDURE — 7100000011 HC PHASE II RECOVERY - ADDTL 15 MIN

## 2022-12-21 PROCEDURE — 93312 ECHO TRANSESOPHAGEAL: CPT

## 2022-12-21 RX ORDER — SODIUM CHLORIDE 0.9 % (FLUSH) 0.9 %
5-40 SYRINGE (ML) INJECTION EVERY 12 HOURS SCHEDULED
Status: DISCONTINUED | OUTPATIENT
Start: 2022-12-21 | End: 2022-12-22 | Stop reason: HOSPADM

## 2022-12-21 RX ORDER — SODIUM CHLORIDE 9 MG/ML
INJECTION, SOLUTION INTRAVENOUS PRN
Status: DISCONTINUED | OUTPATIENT
Start: 2022-12-21 | End: 2022-12-22 | Stop reason: HOSPADM

## 2022-12-21 RX ORDER — SODIUM CHLORIDE 0.9 % (FLUSH) 0.9 %
5-40 SYRINGE (ML) INJECTION PRN
Status: DISCONTINUED | OUTPATIENT
Start: 2022-12-21 | End: 2022-12-22 | Stop reason: HOSPADM

## 2022-12-21 RX ORDER — SODIUM CHLORIDE 9 MG/ML
INJECTION, SOLUTION INTRAVENOUS CONTINUOUS PRN
Status: DISCONTINUED | OUTPATIENT
Start: 2022-12-21 | End: 2022-12-21 | Stop reason: SDUPTHER

## 2022-12-21 RX ORDER — PROPOFOL 10 MG/ML
INJECTION, EMULSION INTRAVENOUS PRN
Status: DISCONTINUED | OUTPATIENT
Start: 2022-12-21 | End: 2022-12-21 | Stop reason: SDUPTHER

## 2022-12-21 RX ADMIN — PROPOFOL 120 MG: 10 INJECTION, EMULSION INTRAVENOUS at 11:10

## 2022-12-21 RX ADMIN — SODIUM CHLORIDE: 9 INJECTION, SOLUTION INTRAVENOUS at 10:30

## 2022-12-21 ASSESSMENT — ENCOUNTER SYMPTOMS: SHORTNESS OF BREATH: 1

## 2022-12-21 ASSESSMENT — PAIN SCALES - GENERAL
PAINLEVEL_OUTOF10: 0

## 2022-12-21 ASSESSMENT — PAIN - FUNCTIONAL ASSESSMENT: PAIN_FUNCTIONAL_ASSESSMENT: NONE - DENIES PAIN

## 2022-12-21 NOTE — OP NOTE
Cardioversion: Consent for operation or procedure: Risks and benefits discussed with patient and consent obtained    Diagnosis: Atrial fibrillation. EBL: 0 ml    The appropriate time-out procedure was performed including proper identification of the patient, physician, procedure, documentation, and there were no safety issues identified. The patient participated actively in this. After sedation was achieved, the patient  was placed in the supine position and hands free patches were placed on his chest in the AP position. JERROD performed. Pt tolerated procedure well. Full report provided separately. 1 shock was provided at, 200 Joules with successful restoration of normal sinus rhythm. Repeat EKG confirms return to sinus rhythm. Complications: The patient tolerated the procedure well without complications.     Ron Yepez MD  Cardiologist  Cardiology, [facility]) Physicians

## 2022-12-21 NOTE — PROGRESS NOTES
1108  in pacu for cadrdioversion / Ian  connected to all monitors and 02  anesthesia here at bedside   1108 Dr Mima Andino here and time out done for Ian/ Cardioversion procedure   See anesthesia notes also   1110 ian done first   1115 repositioned and cardioversion done with 200 joules  monitor shows NSr with 1st degree av block   1120 pt awake  MOHR = to command   1125 12 lead ekg done and Dr Mima Andino spoke to pts daughter

## 2022-12-21 NOTE — PROGRESS NOTES
1140 pts daughter here at bedside   1155 discharge instructions reviewed with pt and his daughter and they verbalized understanding of instructions   1210 discharged into the care of his daughter

## 2022-12-21 NOTE — ANESTHESIA POSTPROCEDURE EVALUATION
Department of Anesthesiology  Postprocedure Note    Patient: Rivera Ochoa  MRN: [id number]  YOB: 1950  Date of evaluation: 12/21/2022      Procedure Summary     Date: 12/21/22 Room / Location: [facility]    Anesthesia Start: 1052 Anesthesia Stop: 1119    Procedure: TRANSESOPHAGEAL ECHO Diagnosis: A-fib [facility])    Scheduled Providers:  Responsible Provider: Celestina Butler MD    Anesthesia Type: MAC ASA Status: 3          Anesthesia Type: No value filed.     Glendy Phase I: Glendy Score: 10    Glendy Phase II:        Anesthesia Post Evaluation    Patient location during evaluation: PACU  Patient participation: complete - patient participated  Level of consciousness: awake  Airway patency: patent  Nausea & Vomiting: no nausea and no vomiting  Complications: no  Cardiovascular status: hemodynamically stable  Respiratory status: acceptable  Hydration status: euvolemic

## 2022-12-21 NOTE — PROGRESS NOTES
Spoke with Mary from Dr Moris He office to verify pt can have a JERROD due to the fact he has had gastric bypass in the past.  Per Mary there are no contraindications for this procedure.   Will notify Dr Bing Davies

## 2022-12-21 NOTE — ANESTHESIA PRE PROCEDURE
Department of Anesthesiology  Preprocedure Note       Name:  Shukri Howard   Age:  67 y.o.  :  1950                                          MRN:  [id number]         Date:  2022      Surgeon: * No surgeons listed *    Procedure: * No procedures listed *    Medications prior to admission:   Prior to Admission medications    Medication Sig Start Date End Date Taking?  Authorizing Provider   apixaban (ELIQUIS) 5 MG TABS tablet 10 mg oral twice daily for 7 days them 5 mg twice daily 22   [facility], DO   metoprolol succinate (TOPROL XL) 25 MG extended release tablet Take 1 tablet by mouth daily 12/15/22   [facility],    losartan-hydroCHLOROthiazide (HYZAAR) 100-25 MG per tablet Take 1/2 pill daily 22   [facility], DO   torsemide (DEMADEX) 20 MG tablet Take 1 tablet by mouth Twice a Week 12/15/22   Morris Garrett DO       Current medications:    Current Outpatient Medications   Medication Sig Dispense Refill    apixaban (ELIQUIS) 5 MG TABS tablet 10 mg oral twice daily for 7 days them 5 mg twice daily 80 tablet 1    metoprolol succinate (TOPROL XL) 25 MG extended release tablet Take 1 tablet by mouth daily 30 tablet 1    losartan-hydroCHLOROthiazide (HYZAAR) 100-25 MG per tablet Take 1/2 pill daily 90 tablet 0    torsemide (DEMADEX) 20 MG tablet Take 1 tablet by mouth Twice a Week 30 tablet 0     Current Facility-Administered Medications   Medication Dose Route Frequency Provider Last Rate Last Admin    sodium chloride flush 0.9 % injection 5-40 mL  5-40 mL IntraVENous 2 times per day Ron Yepez MD        sodium chloride flush 0.9 % injection 5-40 mL  5-40 mL IntraVENous PRN Ron Yepez MD        0.9 % sodium chloride infusion   IntraVENous PRN Ron Yepez MD           Allergies:  No Known Allergies    Problem List:    Patient Active Problem List   Diagnosis Code    Right cataract H26.9    Left cataract H26.9    Hypertension I10    Iron deficiency anemia D50.9    Dyspnea on exertion R06.09    New onset atrial fibrillation (HCC) I48.91    Acute deep vein thrombosis (DVT) of femoral vein (HCC) I82.419    Morbid obesity (HCC) E66.01    Acute on chronic heart failure with preserved ejection fraction (HCC) I50.33       Past Medical History:        Diagnosis Date    Chronic back pain 2008    swarnoma     Hx of blood clots 2003    after gastric bypass surgery    Hypertension     Iron deficiency anemia 01/26/2022       Past Surgical History:        Procedure Laterality Date    BACK SURGERY  2008    spine-herniated disc    CATARACT REMOVAL WITH IMPLANT Left 03/09/2021    CHOLECYSTECTOMY      COLONOSCOPY      GASTRIC BYPASS SURGERY  2003    HERNIA REPAIR      INTRACAPSULAR CATARACT EXTRACTION Right 06/23/2020    RIGHT EYE CATARACT EMULSIFICATION IOL IMPLANT performed by Luis Zarate MD at [address] [place] [address] Left 3/9/2021    LEFT CATARACT EXTRACTION WITH IOL performed by Luis Zarate MD at [address] [place] [address], [place] [address]       Social History:    Social History     Tobacco Use    Smoking status: Never    Smokeless tobacco: Never   Substance Use Topics    Alcohol use:  Yes     Alcohol/week: 1.0 standard drink     Types: 1 Glasses of wine per week     Comment: occasionally                                Counseling given: Not Answered      Vital Signs (Current):   Vitals:    12/21/22 0925   BP: (!) 146/79   Pulse: 70   Resp: 16   Temp: 97.1 °F (36.2 °C)   TempSrc: Temporal   SpO2: 100%   Weight: 278 lb (126.1 kg)   Height: 6' 2\" (1.88 m)                                              BP Readings from Last 3 Encounters:   12/21/22 (!) 146/79   12/14/22 (!) 141/89   01/25/22 (!) 144/80       NPO Status: Time of last liquid consumption: 2200                        Time of last solid consumption: 1830                        Date of last liquid consumption: 12/20/22                        Date of last solid food consumption: 12/20/22    BMI:   Wt Readings from Last 3 Encounters:   12/21/22 278 lb (126.1 kg)   12/19/22 278 lb (126.1 kg)   12/11/22 (!) 307 lb (139.3 kg)     Body mass index is 35.69 kg/m². CBC:   Lab Results   Component Value Date/Time    WBC 5.2 12/14/2022 06:57 AM    RBC 3.28 12/14/2022 06:57 AM    HGB 8.7 12/14/2022 06:57 AM    HCT 28.4 12/14/2022 06:57 AM    MCV 86.6 12/14/2022 06:57 AM    RDW 18.1 12/14/2022 06:57 AM     12/14/2022 06:57 AM       CMP:   Lab Results   Component Value Date/Time     12/14/2022 06:57 AM    K 3.9 12/14/2022 06:57 AM    K 4.2 12/11/2022 03:29 PM     12/14/2022 06:57 AM    CO2 22 12/14/2022 06:57 AM    BUN 16 12/14/2022 06:57 AM    CREATININE 1.3 12/14/2022 06:57 AM    GFRAA >60 01/25/2022 11:10 AM    LABGLOM 58 12/14/2022 06:57 AM    GLUCOSE 94 12/14/2022 06:57 AM    PROT 6.1 12/14/2022 06:57 AM    CALCIUM 8.4 12/14/2022 06:57 AM    BILITOT 0.4 12/14/2022 06:57 AM    ALKPHOS 98 12/14/2022 06:57 AM    AST 28 12/14/2022 06:57 AM    ALT 16 12/14/2022 06:57 AM       POC Tests: No results for input(s): POCGLU, POCNA, POCK, POCCL, POCBUN, POCHEMO, POCHCT in the last 72 hours.     Coags:   Lab Results   Component Value Date/Time    PROTIME 14.3 12/12/2022 04:31 PM    INR 1.2 12/12/2022 04:31 PM       HCG (If Applicable): No results found for: PREGTESTUR, PREGSERUM, HCG, HCGQUANT     ABGs: No results found for: PHART, PO2ART, JYC5QRP, AOU2BIZ, BEART, L1VTROVE     Type & Screen (If Applicable):  No results found for: LABABO, LABRH    Drug/Infectious Status (If Applicable):  No results found for: HIV, HEPCAB    COVID-19 Screening (If Applicable):   Lab Results   Component Value Date/Time    COVID19 Not Detected 12/12/2022 02:57 PM           Anesthesia Evaluation  Patient summary reviewed no history of anesthetic complications:   Airway: Mallampati: III  TM distance: >3 FB   Neck ROM: full  Mouth opening: > = 3 FB   Dental:      Comment: No upper teeth  Most bottom teeth are missing. Pulmonary: breath sounds clear to auscultation  (+) shortness of breath:                             Cardiovascular:    (+) hypertension:, dysrhythmias: atrial fibrillation,         Rhythm: irregular  Rate: normal                    Neuro/Psych:   Negative Neuro/Psych ROS              GI/Hepatic/Renal:            ROS comment: S/P gastric bypass. Endo/Other: Negative Endo/Other ROS   (+) blood dyscrasia: anemia:., .                 Abdominal:             Vascular:   + DVT, . Other Findings:           Anesthesia Plan      MAC     ASA 3     (Pt agrees to [facility] ATHENS and IV sedation.)  Induction: intravenous. Anesthetic plan and risks discussed with patient. Plan discussed with CRNA.                     Brenton Gustafson MD   12/21/2022

## 2022-12-28 ENCOUNTER — TELEPHONE (OUTPATIENT)
Dept: CARDIOLOGY CLINIC | Age: 72
End: 2022-12-28

## 2022-12-28 NOTE — TELEPHONE ENCOUNTER
----- Message from Kike Nobles MD sent at 12/21/2022 11:32 AM EST -----  I just did his JERROD cardioversion. Successful. Lets schedule a follow up next month. I put in a referral for vascular surgery. Can you help him get an appointment please. Thanks.

## 2022-12-28 NOTE — TELEPHONE ENCOUNTER
Appointment scheduled Dr. Ginny Ramos 1-3                                           Dr. Dana Yanes  2-13

## 2022-12-29 ENCOUNTER — OFFICE VISIT (OUTPATIENT)
Dept: PRIMARY CARE CLINIC | Age: 72
End: 2022-12-29

## 2022-12-29 VITALS
OXYGEN SATURATION: 98 % | DIASTOLIC BLOOD PRESSURE: 94 MMHG | WEIGHT: 292 LBS | HEIGHT: 74 IN | SYSTOLIC BLOOD PRESSURE: 162 MMHG | BODY MASS INDEX: 37.47 KG/M2 | RESPIRATION RATE: 18 BRPM | TEMPERATURE: 98 F | HEART RATE: 72 BPM

## 2022-12-29 DIAGNOSIS — Z09 HOSPITAL DISCHARGE FOLLOW-UP: Primary | ICD-10-CM

## 2022-12-29 DIAGNOSIS — I10 PRIMARY HYPERTENSION: ICD-10-CM

## 2022-12-29 RX ORDER — LOSARTAN POTASSIUM AND HYDROCHLOROTHIAZIDE 12.5; 5 MG/1; MG/1
1 TABLET ORAL DAILY
Qty: 90 TABLET | Refills: 1 | Status: SHIPPED | OUTPATIENT
Start: 2023-01-13 | End: 2023-07-12

## 2022-12-29 NOTE — LETTER
[address]  [address] [place] [address]  Phone: [phone]  Fax: [phone] [facility] Nav [address], DO        December 29, 2022     Patient: He Brown   YOB: 1950   Date of Visit: 12/29/2022       To Whom It May Concern: It is my medical opinion that Elaine Cipro should not participate in formal exercise until cleared by cardiology. If you have any questions or concerns, please don't hesitate to call.     Sincerely,        Flora Matias, DO

## 2022-12-29 NOTE — LETTER
[address]  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 No lifting >10 pounds. If you have any questions or concerns, please don't hesitate to call.     Sincerely,        Raymon Vicente, DO

## 2022-12-29 NOTE — PROGRESS NOTES
Post-Discharge Transitional Care  Follow Up      Manpreet Curiel   YOB: 1950    Date of Office Visit:  12/29/2022  Date of Hospital Admission: 12/11/22  Date of Hospital Discharge: 12/14/22  Risk of hospital readmission (high >=14%. Medium >=10%) :Readmission Risk Score: 12.6      Care management risk score Rising risk (score 2-5) and Complex Care (Scores >=6): No Risk Score On File     Non face to face  following discharge, date last encounter closed (first attempt may have been earlier): 12/15/2022    Call initiated 2 business days of discharge: Yes    ASSESSMENT/PLAN:   Hospital discharge follow-up  -     [place] DISCHARGE MEDS RECONCILED W/ CURRENT OUTPATIENT MED LIST    Medical Decision Making: moderate complexity  Return in 3 months (on 3/29/2023). Subjective:   HPI:  Follow up of Hospital problems/diagnosis(es): A-fib  Inpatient course: Discharge summary reviewed- see chart. Interval history/Current status: Pt states that he is doing well overall and offers no complaints. Hospital course discussed with the pt. States that he is doing well at home. Underwent JERROD w cardioversion which was successful. Denies chest pain or shortness of breath. No orthopnea. Good appetite. Regular bowel and bladder habits. Medications reviewed- he is taking these as prescribed. He does have a f/u w cardiology- Dr. Rebeka Cleaning- in the future. Patient Active Problem List   Diagnosis    Right cataract    Left cataract    Hypertension    Iron deficiency anemia    Dyspnea on exertion    New onset atrial fibrillation (HCC)    Acute deep vein thrombosis (DVT) of femoral vein (HCC)    Morbid obesity ([address]    Acute on chronic heart failure with preserved ejection fraction ([address]       Medications listed as ordered at the time of discharge from hospital     Medication List            Accurate as of December 29, 2022  3:02 PM. If you have any questions, ask your nurse or doctor.                 CHANGE how you take these medications      losartan-hydroCHLOROthiazide 100-25 MG per tablet  Commonly known as: HYZAAR  Take 1/2 pill daily  What changed:   when to take this  additional instructions            CONTINUE taking these medications      apixaban 5 MG Tabs tablet  Commonly known as: Eliquis  10 mg oral twice daily for 7 days them 5 mg twice daily     metoprolol succinate 25 MG extended release tablet  Commonly known as: TOPROL XL  Take 1 tablet by mouth daily     torsemide 20 MG tablet  Commonly known as: Demadex  Take 1 tablet by mouth Twice a Week                Medications marked \"taking\" at this time  Outpatient Medications Marked as Taking for the 12/29/22 encounter (Office Visit) with Louie Weldon, DO   Medication Sig Dispense Refill    metoprolol succinate (TOPROL XL) 25 MG extended release tablet Take 1 tablet by mouth daily 30 tablet 1    losartan-hydroCHLOROthiazide (HYZAAR) 100-25 MG per tablet Take 1/2 pill daily (Patient taking differently: daily Takes one pill. 50-25mg) 90 tablet 0    torsemide (DEMADEX) 20 MG tablet Take 1 tablet by mouth Twice a Week 30 tablet 0        Medications patient taking as of now reconciled against medications ordered at time of hospital discharge: Yes    A comprehensive review of systems was negative except for what was noted in the HPI.     Objective:    BP (!) 162/94   Pulse 72   Temp 98 °F (36.7 °C)   Resp 18   Ht 6' 2\" (1.88 m)   Wt 292 lb (132.5 kg)   SpO2 98%   BMI 37.49 kg/m²   General Appearance: alert and oriented to person, place and time, well developed and well- nourished, in no acute distress  Skin: warm and dry, no rash or erythema  Head: normocephalic and atraumatic  Eyes: pupils equal, round, and reactive to light, extraocular eye movements intact, conjunctivae normal  ENT: tympanic membrane, external ear and ear canal normal bilaterally, nose without deformity, nasal mucosa and turbinates normal without polyps  Neck: supple and non-tender without mass, no thyromegaly or thyroid nodules, no cervical lymphadenopathy  Pulmonary/Chest: clear to auscultation bilaterally- no wheezes, rales or rhonchi, normal air movement, no respiratory distress  Cardiovascular: normal rate, regular rhythm, normal S1 and S2, no murmurs, rubs, clicks, or gallops, distal pulses intact, no carotid bruits  Abdomen: soft, non-tender, non-distended, normal bowel sounds, no masses or organomegaly  Extremities: no cyanosis, clubbing or edema  Musculoskeletal: normal range of motion, no joint swelling, deformity or tenderness  Neurologic: reflexes normal and symmetric, no cranial nerve deficit, gait, coordination and speech normal      An electronic signature was used to authenticate this note.   --Romel Murphy, DO

## 2023-01-03 ENCOUNTER — OFFICE VISIT (OUTPATIENT)
Dept: CARDIOLOGY CLINIC | Age: 73
End: 2023-01-03
Payer: COMMERCIAL

## 2023-01-03 VITALS
HEART RATE: 59 BPM | BODY MASS INDEX: 37.73 KG/M2 | DIASTOLIC BLOOD PRESSURE: 88 MMHG | SYSTOLIC BLOOD PRESSURE: 166 MMHG | HEIGHT: 74 IN | WEIGHT: 294 LBS | RESPIRATION RATE: 20 BRPM

## 2023-01-03 DIAGNOSIS — G47.33 OSA (OBSTRUCTIVE SLEEP APNEA): ICD-10-CM

## 2023-01-03 DIAGNOSIS — E66.01 SEVERE OBESITY (BMI 35.0-39.9) WITH COMORBIDITY (HCC): ICD-10-CM

## 2023-01-03 DIAGNOSIS — I48.91 NEW ONSET ATRIAL FIBRILLATION (HCC): Primary | ICD-10-CM

## 2023-01-03 DIAGNOSIS — I10 PRIMARY HYPERTENSION: ICD-10-CM

## 2023-01-03 DIAGNOSIS — I82.419 ACUTE DEEP VEIN THROMBOSIS (DVT) OF FEMORAL VEIN, UNSPECIFIED LATERALITY (HCC): ICD-10-CM

## 2023-01-03 DIAGNOSIS — I50.32 CHRONIC HEART FAILURE WITH PRESERVED EJECTION FRACTION (HCC): ICD-10-CM

## 2023-01-03 PROCEDURE — 1123F ACP DISCUSS/DSCN MKR DOCD: CPT | Performed by: INTERNAL MEDICINE

## 2023-01-03 PROCEDURE — 3074F SYST BP LT 130 MM HG: CPT | Performed by: INTERNAL MEDICINE

## 2023-01-03 PROCEDURE — 99214 OFFICE O/P EST MOD 30 MIN: CPT | Performed by: INTERNAL MEDICINE

## 2023-01-03 PROCEDURE — 93000 ELECTROCARDIOGRAM COMPLETE: CPT | Performed by: INTERNAL MEDICINE

## 2023-01-03 PROCEDURE — 3078F DIAST BP <80 MM HG: CPT | Performed by: INTERNAL MEDICINE

## 2023-01-03 NOTE — PROGRESS NOTES
CHIEF COMPLAINT:   Chief Complaint   Patient presents with    Shortness of Breath    Loss of Consciousness     OV-c/o dizziness and feeling foggy        HISTORY OF PRESENT ILLNESS: Patient is a 67 y.o. male who is here for a follow up visit with me. He has a history of morbid obesity status post gastric bypass surgery, recently diagnosed paroxysmal atrial fibrillation, hypertension, heart failure with preserved ejection fraction. I had seen him in the hospital last month for new onset atrial fibrillation. He presented to the hospital complaining of dizziness. He also had complaints of bilateral hip pain and ear fullness. He does have a history of DVTs with an IVC filter in place. He was on anticoagulation transiently. He was initiated on anticoagulation and deidre control agents. He was found to have new DVT during his hospitalization. He was subsequently discharged. He presented back as an outpatient for JERROD guided cardioversion which was successful. Since then, he has noticed an improvement in his functional capacity. No further visits to the hospital or emergency room. At today's office visit, He  denies any chest pain, shortness of breath, palpitations, dizziness, pedal edema. The patient is capable of activities of daily living. There is dyspnea on more than moderate exertion. There is no orthopnea or PND. He is compliant with medications as well as diet and exercise regimen.     Prior Cardiac workup:        Past Medical History:   Diagnosis Date    Chronic back pain 2008    [facility]     Hx of blood clots 2003    after gastric bypass surgery    Hypertension     Iron deficiency anemia 01/26/2022       No Known Allergies    Current Outpatient Medications   Medication Sig Dispense Refill    [START ON 1/13/2023] losartan-hydroCHLOROthiazide (HYZAAR) 50-12.5 MG per tablet Take 1 tablet by mouth daily 90 tablet 1    apixaban (ELIQUIS) 5 MG TABS tablet 10 mg oral twice daily for 7 days them 5 mg twice daily 80 tablet 1    metoprolol succinate (TOPROL XL) 25 MG extended release tablet Take 1 tablet by mouth daily 30 tablet 1    torsemide (DEMADEX) 20 MG tablet Take 1 tablet by mouth Twice a Week 30 tablet 0     No current facility-administered medications for this visit. Social History     Socioeconomic History    Marital status:      Spouse name: Not on file    Number of children: Not on file    Years of education: Not on file    Highest education level: Not on file   Occupational History    Not on file   Tobacco Use    Smoking status: Never    Smokeless tobacco: Never   Vaping Use    Vaping Use: Never used   Substance and Sexual Activity    Alcohol use: Yes     Alcohol/week: 1.0 standard drink     Types: 1 Glasses of wine per week     Comment: occasionally    Drug use: No    Sexual activity: Not on file   Other Topics Concern    Not on file   Social History Narrative    Not on file     Social Determinants of Health     Financial Resource Strain: Low Risk     Difficulty of Paying Living Expenses: Not hard at all   Food Insecurity: No Food Insecurity    Worried About Running Out of Food in the Last Year: Never true    Ran Out of Food in the Last Year: Never true   Transportation Needs: Not on file   Physical Activity: Not on file   Stress: Not on file   Social Connections: Not on file   Intimate Partner Violence: Not on file   Housing Stability: Not on file       No family history on file. Review of Systems  Constitutional: Negative for fever, malaise/fatigue and weight loss. HENT: Negative for sore throat and tinnitus. Eyes: Negative for blurred vision and double vision. Respiratory: Negative for shortness of breath. Negative for cough and wheezing. Cardiovascular: As mentioned in HPI. Gastrointestinal: Negative for abdominal pain, heartburn, nausea and vomiting. Genitourinary: Negative. Musculoskeletal: Negative for back pain, joint pain and myalgias.   Neurological: Negative for dizziness, tremors, loss of consciousness and headaches. Endo/Heme/Allergies: Negative. Psychiatric/Behavioral: Negative for depression and suicidal ideas. Physical Exam   BP (!) 166/88   Pulse 59   Resp 20   Ht 6' 2\" (1.88 m)   Wt 294 lb (133.4 kg)   BMI 37.75 kg/m²   Constitutional: Oriented to person, place, and time. Well-developed and well-nourished. No distress. Head: Normocephalic and atraumatic. Eyes: EOM are normal. Pupils are equal, round, and reactive to light. Neck: Normal range of motion. Neck supple. No hepatojugular reflux and no JVD present. Carotid bruit is not present. No tracheal deviation present. No thyromegaly present. Cardiovascular: Normal rate, regular rhythm, normal heart sounds and intact distal pulses. Exam reveals no gallop and no friction rub. No murmur heard. Pulmonary/Chest: Effort normal and breath sounds normal. No respiratory distress. No wheezes. No rales. No tenderness. Abdominal: Soft. Bowel sounds are normal. No distension and no mass. No tenderness. No rebound and no guarding. Musculoskeletal: Normal range of motion. No edema and no tenderness. Lymphadenopathy:   No cervical adenopathy. Neurological: Alert and oriented to person, place, and time. Skin: Skin is warm and dry. No rash noted. Not diaphoretic. No erythema. Psychiatric: Normal mood and affect. Behavior is normal.     Procedures and Testing  EKG: Done in the office today and reviewed by me. Shows sinus bradycardia. Low voltage limb leads. First-degree AV delay with a MA interval of 218 ms. Cardiac Tests:     Echo  2017  2D echo is of reduced quality due to a poor acoustic window. Moderate left ventricular concentric hypertrophy noted. Measured ejection fraction is 73 %. Normal left ventricular ejection fraction. There is doppler evidence of stage I diastolic dysfunction. The left atrium is mild-moderately dilated. Mildly dilated right ventricle.    Moderately enlarged right atrium size. Trace mitral regurgitation is present. Trace tricuspid regurgitation. There is borderline pulmonary hypertension. Borderline dilated aortic root. Echocardiogram from 12/13/2022:   Mildly dilated left ventricular chamber size. Normal left ventricular systolic function. Moderate concentric LVH. Visually estimated LVEF is 55-60 %. No wall motion abnormalities. Diastolic pattern consistent with atrial fibrillation. Normal right ventricle structure and function. The left atrium is moderately dilated. Moderately enlarged right atrium size. Mild-moderate mitral regurgitation is present. Likely normal estimated PA pressure. Technically difficult examination. Definity contrast used for LV   opacification. JERROD on 12/21/2022:   Ejection fraction is visually estimated at 55-60%. Right ventricle global systolic function is normal .   No evidence of thrombus within left atrium/ Left atrial appendage. Emptying velocity is normal at 40 cms/s. No evidence of thrombus or mass in the right atrium. Mild-moderate mitral regurgitation is present. Mildly redundant mitral   valve leaflets. Mild diffuse atherosclerosis in the descending aorta. Agitated saline contrast study negative for ASD/ PFO. Status post successful direct-current cardioversion on 12/21/2022:     ASSESSMENT AND PLAN:  New onset atrial fibrillation with variable ventricular response-in sinus rhythm today. Near syncope with component of orthostatic hypotension  Essential hypertension   Acute on chronic low back pain   Ectatic abdominal aorta without aneurysm  Left common iliac artery aneurysm  Heart failure with preserved ejection fraction: He is euvolemic on examination.   Chronic anemia without overt blood loss  History of venous thromboembolism with IVC filter in place  Severe obesity obesity with BMI 37.75 kg meter squared and prior Melina-en-Y gastric bypass     Recommendations:  He is in sinus rhythm today. He will continue with current doses of Toprol-XL 25 mg daily for rate control. He will continue with Eliquis 5 mg twice daily for anticoagulation. He appears euvolemic on examination. He is now taking Demadex 20 mg 2 times a week. He will use additional doses as needed for symptoms or weight gain. He will continue with current doses of Cozaar 50 mg daily. He does have a history of gout. The HCTZ is a new medication for him. I have asked him to switch from Hyzaar to Cozaar. His pressures are elevated in the office today. He states that better controlled at home. I have asked him to monitor his pressures and call me if they are above goal of 130/80. Salt restriction counseling provided. He does have some symptoms concerning for sleep apnea. I will schedule home sleep study for further evaluation. Follow-up with me in 6 months. Silas Haley MD, [facility]     NOTE: This report was transcribed using voice recognition software. Every effort was made to ensure accuracy; however, inadvertent computerized transcription errors may be present.

## 2023-01-03 NOTE — PATIENT INSTRUCTIONS
Continue all your medications at current doses. Try taking the cozaar. Stop the hctz. Take the toprol xl at night. Restrict sodium intake to less than 2-2.5 g/day. Restrict fluid intake to less than 2.2 L/day. Goal BP is less than 130/80. Monitor BP for 1 week. Please try to exercise for 150 minutes a week. I will see you back in the office in 6 months. Please call the office at ([phone], option 2) if you have any questions.

## 2023-01-04 PROBLEM — I50.32 CHRONIC HEART FAILURE WITH PRESERVED EJECTION FRACTION (HCC): Status: ACTIVE | Noted: 2022-12-14

## 2023-01-04 PROBLEM — G47.33 OSA (OBSTRUCTIVE SLEEP APNEA): Status: ACTIVE | Noted: 2023-01-04

## 2023-01-13 ENCOUNTER — HOSPITAL ENCOUNTER (OUTPATIENT)
Dept: SLEEP CENTER | Age: 73
Discharge: HOME | End: 2023-01-13
Payer: COMMERCIAL

## 2023-01-13 DIAGNOSIS — I48.91 NEW ONSET ATRIAL FIBRILLATION (HCC): ICD-10-CM

## 2023-01-13 DIAGNOSIS — G47.33 OSA (OBSTRUCTIVE SLEEP APNEA): ICD-10-CM

## 2023-01-13 PROCEDURE — 95800 SLP STDY UNATTENDED: CPT

## 2023-01-31 ENCOUNTER — TELEPHONE (OUTPATIENT)
Dept: CARDIOLOGY CLINIC | Age: 73
End: 2023-01-31

## 2023-01-31 ENCOUNTER — OFFICE VISIT (OUTPATIENT)
Dept: PRIMARY CARE CLINIC | Age: 73
End: 2023-01-31
Payer: COMMERCIAL

## 2023-01-31 VITALS
HEIGHT: 74 IN | TEMPERATURE: 97.2 F | BODY MASS INDEX: 35.81 KG/M2 | RESPIRATION RATE: 18 BRPM | WEIGHT: 279 LBS | SYSTOLIC BLOOD PRESSURE: 150 MMHG | OXYGEN SATURATION: 98 % | DIASTOLIC BLOOD PRESSURE: 78 MMHG | HEART RATE: 71 BPM

## 2023-01-31 DIAGNOSIS — M10.279 ACUTE DRUG-INDUCED GOUT INVOLVING TOE, UNSPECIFIED LATERALITY: Primary | ICD-10-CM

## 2023-01-31 PROCEDURE — 99214 OFFICE O/P EST MOD 30 MIN: CPT | Performed by: FAMILY MEDICINE

## 2023-01-31 PROCEDURE — 3078F DIAST BP <80 MM HG: CPT | Performed by: FAMILY MEDICINE

## 2023-01-31 PROCEDURE — 3074F SYST BP LT 130 MM HG: CPT | Performed by: FAMILY MEDICINE

## 2023-01-31 PROCEDURE — 1123F ACP DISCUSS/DSCN MKR DOCD: CPT | Performed by: FAMILY MEDICINE

## 2023-01-31 RX ORDER — ALLOPURINOL 100 MG/1
100 TABLET ORAL DAILY
Qty: 90 TABLET | Refills: 1 | Status: SHIPPED | OUTPATIENT
Start: 2023-01-31

## 2023-01-31 SDOH — ECONOMIC STABILITY: FOOD INSECURITY: WITHIN THE PAST 12 MONTHS, THE FOOD YOU BOUGHT JUST DIDN'T LAST AND YOU DIDN'T HAVE MONEY TO GET MORE.: NEVER TRUE

## 2023-01-31 SDOH — ECONOMIC STABILITY: FOOD INSECURITY: WITHIN THE PAST 12 MONTHS, YOU WORRIED THAT YOUR FOOD WOULD RUN OUT BEFORE YOU GOT MONEY TO BUY MORE.: NEVER TRUE

## 2023-01-31 ASSESSMENT — PATIENT HEALTH QUESTIONNAIRE - PHQ9
SUM OF ALL RESPONSES TO PHQ9 QUESTIONS 1 & 2: 0
SUM OF ALL RESPONSES TO PHQ QUESTIONS 1-9: 0
1. LITTLE INTEREST OR PLEASURE IN DOING THINGS: 0
SUM OF ALL RESPONSES TO PHQ QUESTIONS 1-9: 0
2. FEELING DOWN, DEPRESSED OR HOPELESS: 0

## 2023-01-31 ASSESSMENT — SOCIAL DETERMINANTS OF HEALTH (SDOH): HOW HARD IS IT FOR YOU TO PAY FOR THE VERY BASICS LIKE FOOD, HOUSING, MEDICAL CARE, AND HEATING?: NOT HARD AT ALL

## 2023-01-31 NOTE — TELEPHONE ENCOUNTER
Patient complaining of side effects from the Toprol. States he gets extremely wiped out, dizzy, \"not right in the head\", & difficulty focusing. Patient also tried taking it at night but had trouble maing it to the bathroom due to dizziness.  Please advise

## 2023-01-31 NOTE — PROGRESS NOTES
Subjective:  67 y.o. male who presents to the office today with chief complaint:  Chief Complaint   Patient presents with    Hypertension    Skin Problem     Under nose. Red and itches, gets whiteheads sometimes. Sore to touch. A couple weeks now    Discuss Medications     Stopped metoprolol due to him making him tired and was getting some swelling in the toe. A-fib: Follows w Dr. Neda Castillo. Stopped metoprolol d/t concern that it was causing him gout, as well as fatigue. BP running 135 to 157mmHg. Taking BP daily. Skin rash: Under nose; initially began on upper lip and has extended. Sore to touch. Will form \"white head\" without drainage that he has noticed. Has not attempted any meds on it other than vaseline or vicks in nose to \"ease up the dryness\" under the nose. Health Maintenance Due   Topic Date Due    Pneumococcal 65+ years Vaccine (1 - PCV) Never done    Hepatitis C screen  Never done    DTaP/Tdap/Td vaccine (1 - Tdap) Never done    Colorectal Cancer Screen  Never done    Shingles vaccine (1 of 2) Never done    Annual Wellness Visit (AWV)  Never done    COVID-19 Vaccine (4 - Booster for Pfizer series) 01/27/2022    Flu vaccine (1) Never done       Cancer History:  Family Cancer History:    Review of Systems    Review of Systems: All bolded are positive, all others are negative. General:  Fever, chills, diaphoresis, fatigue, malaise, night sweats, weight loss  Psychological:  Anxiety, disorientation, hallucinations. ENT:  Epistaxis, headaches, vertigo, visual changes. Cardiovascular:  Chest pain, irregular heartbeats, palpitations, paroxysmal nocturnal dyspnea. Respiratory:  Shortness of breath, coughing, sputum production, hemoptysis, wheezing, orthopnea.   Gastrointestinal:  Nausea, vomiting, diarrhea, heartburn, constipation, abdominal pain, hematemesis, hematochezia, melena, acholic stools  Genito-Urinary:  Dysuria, urgency, frequency, hematuria  Musculoskeletal:  Joint pain, joint stiffness, joint swelling, muscle pain  Neurology:  Headache, focal neurological deficits, weakness, numbness, paresthesia  Derm:  Rashes, ulcers, excoriations, bruising  Extremities:  Decreased ROM, peripheral edema, mottling      Objective:  Vitals:    01/31/23 1113   BP: (!) 150/78   Pulse: 71   Resp: 18   Temp: 97.2 °F (36.2 °C)   SpO2: 98%     Physical Exam  Constitutional:       General: He is not in acute distress. Appearance: He is well-developed. He is not diaphoretic. HENT:      Head: Normocephalic and atraumatic. Right Ear: External ear normal.      Left Ear: External ear normal.      Nose: Nose normal.      Mouth/Throat:      Pharynx: No oropharyngeal exudate. Eyes:      General:         Right eye: No discharge. Left eye: No discharge. Conjunctiva/sclera: Conjunctivae normal.      Pupils: Pupils are equal, round, and reactive to light. Cardiovascular:      Rate and Rhythm: Normal rate and regular rhythm. Pulses: Normal pulses. Heart sounds: Normal heart sounds. No murmur heard. No friction rub. No gallop. Pulmonary:      Effort: Pulmonary effort is normal. No respiratory distress. Breath sounds: Normal breath sounds. No wheezing or rales. Abdominal:      General: Bowel sounds are normal. There is no distension. Palpations: Abdomen is soft. Tenderness: There is no abdominal tenderness. There is no guarding or rebound. Musculoskeletal:      Cervical back: Normal range of motion and neck supple. Lymphadenopathy:      Cervical: No cervical adenopathy. Skin:     General: Skin is warm. Comments: Erythematous, crusting rash extending from naris through philtrum to upper boarder of upper lip. Neurological:      Mental Status: He is alert and oriented to person, place, and time. Psychiatric:         Mood and Affect: Mood normal.         Behavior: Behavior normal.         Thought Content:  Thought content normal.         Judgment: Judgment normal. Results for orders placed or performed during the hospital encounter of 12/21/22   EKG 12 Lead   Result Value Ref Range    Ventricular Rate 60 BPM    Atrial Rate 60 BPM    P-R Interval 234 ms    QRS Duration 98 ms    Q-T Interval 440 ms    QTc Calculation (Bazett) 440 ms    P Axis 34 degrees    R Axis -30 degrees    T Axis 3 degrees   ECHO Transesophageal   Result Value Ref Range    Left Ventricular Ejection Fraction 58     LVEF MODALITY ECHO          Assessment and Plan:  Janis Brambila was seen today for hypertension, skin problem and discuss medications. Diagnoses and all orders for this visit:    Acute drug-induced gout involving toe, unspecified laterality  -     allopurinol (ZYLOPRIM) 100 MG tablet; Take 1 tablet by mouth daily    Other orders  -     mupirocin (BACTROBAN) 2 % ointment; Apply topically 3 times daily. Gout: Not w current flare. Allopurinol 100mg qd ordered to minimize use of NSAIDs w eliquis. Check uric acid next visit. A-fib: Pt will call Dr. Joey Ozuna office to discuss fatigue caused by metoprolol. HR in good range. BP out of range. If not started on another BB by cardiology, will require another class of antihypertensive. Skin lesion: Mupirocin ointment prescribed; to be used TID. Return in about 2 months (around 3/31/2023) for gout. Patient may come in sooner if needed for medical concerns. Patient advised to call at any time to cancel, re-schedule, or for any questions/concerns.             Simone Weldon,     1/31/23  11:42 AM

## 2023-02-01 RX ORDER — DILTIAZEM HYDROCHLORIDE 120 MG/1
120 CAPSULE, EXTENDED RELEASE ORAL DAILY
COMMUNITY
End: 2023-02-02 | Stop reason: SDUPTHER

## 2023-02-01 NOTE — TELEPHONE ENCOUNTER
Patient states BP has been running upper 140s 150s/ 70s. Patient agrees to try the cardizem. Sent scripts.

## 2023-02-02 RX ORDER — DILTIAZEM HYDROCHLORIDE 120 MG/1
120 CAPSULE, EXTENDED RELEASE ORAL DAILY
Qty: 90 CAPSULE | Refills: 3 | Status: SHIPPED | OUTPATIENT
Start: 2023-02-02

## 2023-02-13 ENCOUNTER — OFFICE VISIT (OUTPATIENT)
Dept: VASCULAR SURGERY | Age: 73
End: 2023-02-13
Payer: COMMERCIAL

## 2023-02-13 DIAGNOSIS — Z95.828 S/P IVC FILTER: ICD-10-CM

## 2023-02-13 DIAGNOSIS — I26.94 MULTIPLE SUBSEGMENTAL PULMONARY EMBOLI WITHOUT ACUTE COR PULMONALE (HCC): ICD-10-CM

## 2023-02-13 DIAGNOSIS — I72.3 ANEURYSM OF LEFT COMMON ILIAC ARTERY (HCC): Primary | ICD-10-CM

## 2023-02-13 DIAGNOSIS — I82.411 DEEP VEIN THROMBOSIS (DVT) OF FEMORAL VEIN OF RIGHT LOWER EXTREMITY, UNSPECIFIED CHRONICITY (HCC): ICD-10-CM

## 2023-02-13 PROCEDURE — 99205 OFFICE O/P NEW HI 60 MIN: CPT | Performed by: SURGERY

## 2023-02-13 PROCEDURE — 1123F ACP DISCUSS/DSCN MKR DOCD: CPT | Performed by: SURGERY

## 2023-02-13 NOTE — PROGRESS NOTES
Vascular Surgery Outpatient Consultation    PCP : Bhavya Montejo DO  Cardiologist : Dr. Hayden Gutierrez:    The patient is a 67 y.o. male who is here in regards to findings of AAA on imaging noted incidentally. He was seen in er and was dizzy at work. He was feeling a little sob. He went to ER and was evaluated. Per his report he thinks his bp was a little low for him. He has not had any further episodes. He denies abdominal or back pain. He has chronic knee pain. Otherwise he is able to walk. He has had hx of     He has gastric bypass in 2003. He had developed LE DVT and PE. He had IVC filter placed. During recent admission he has noted to have high probability of PE on VQ scan. He had venous us done noting R LE DVT, he was started on eliquis in tx of this issue. He denies issues with lower extremity swelling or edema. Echo done at that time did not note RV dilation.        ROS : All others Negative if blank [], Positive if [x]  General Urinary   [] Fevers [] Hematuria   [] Chills [] Dysuria   [] Weight Loss Vascular   Skin [] Claudication   [] Tissue Loss [] Rest Pain   Eyes Neurologic   [x] Wears Glasses/Contacts [] Stroke/TIA   [] Vision Changes [] Focal weakness   Respiratory [] Slurred Speech    [] Shortness of breath ENT   Cardiovascular [] Difficulty swallowing   [] Chest Pain Endocrine    [x] Shortness of breath with exertion [] Increased Thirst   Gastrointestinal    [] Abdominal Pain    [] Melena   [] Hematochezia         Past Medical History:        Diagnosis Date    Chronic back pain 2008    swarnoma     Hx of blood clots 2003    after gastric bypass surgery    Hypertension     Iron deficiency anemia 01/26/2022     Past Surgical History:        Procedure Laterality Date    BACK SURGERY  2008    spine-herniated disc    CATARACT REMOVAL WITH IMPLANT Left 03/09/2021    CHOLECYSTECTOMY      COLONOSCOPY      GASTRIC BYPASS SURGERY  2003    HERNIA REPAIR INTRACAPSULAR CATARACT EXTRACTION Right 06/23/2020    RIGHT EYE CATARACT EMULSIFICATION IOL IMPLANT performed by Cole Marshall MD at [facility] Left 3/9/2021    LEFT CATARACT EXTRACTION WITH IOL performed by Cole Marshall MD at [address] [address]     Current Medications:   Current Outpatient Medications   Medication Sig Dispense Refill    allopurinol (ZYLOPRIM) 100 MG tablet Take 1 tablet by mouth daily 90 tablet 1    losartan-hydroCHLOROthiazide (HYZAAR) 50-12.5 MG per tablet Take 1 tablet by mouth daily 90 tablet 1    apixaban (ELIQUIS) 5 MG TABS tablet 10 mg oral twice daily for 7 days them 5 mg twice daily 80 tablet 1    torsemide (DEMADEX) 20 MG tablet Take 1 tablet by mouth Twice a Week 30 tablet 0    dilTIAZem (CARDIZEM 12 HR) 120 MG extended release capsule Take 1 capsule by mouth daily 90 capsule 3     No current facility-administered medications for this visit. Allergies:  Patient has no known allergies. Social History     Socioeconomic History    Marital status:      Spouse name: Not on file    Number of children: Not on file    Years of education: Not on file    Highest education level: Not on file   Occupational History    Not on file   Tobacco Use    Smoking status: Never    Smokeless tobacco: Never   Vaping Use    Vaping Use: Never used   Substance and Sexual Activity    Alcohol use:  Yes     Alcohol/week: 1.0 standard drink     Types: 1 Glasses of wine per week     Comment: occasionally    Drug use: No    Sexual activity: Not on file   Other Topics Concern    Not on file   Social History Narrative    Not on file     Social Determinants of Health     Financial Resource Strain: Low Risk     Difficulty of Paying Living Expenses: Not hard at all   Food Insecurity: No Food Insecurity    Worried About [address] in the Last Year: Never true    [address] in the Last Year: Never true   Transportation Needs: Not on file   Physical Activity: Not on file   Stress: Not on file   Social Connections: Not on file   Intimate Partner Violence: Not on file   Housing Stability: Not on file     Family Hx  Denies hx of AAA    Labs  Lab Results   Component Value Date    WBC 5.2 12/14/2022    HGB 8.7 (L) 12/14/2022    HCT 28.4 (L) 12/14/2022     12/14/2022    PROTIME 14.3 (H) 12/12/2022    INR 1.2 12/12/2022    K 3.9 12/14/2022    BUN 16 12/14/2022    CREATININE 1.3 (H) 12/14/2022     PHYSICAL EXAM:    There were no vitals taken for this visit.   CONSTITUTIONAL:   Awake, alert, cooperative  PSYCHIATRIC :  Oriented to time, place and person     Appropriate insight to disease process  EYES: Lids and lashes normal  ENT:  External ears and nose without lesions   Hearing deficits not noted  NECK: Supple, symmetrical, trachea midline   Thyroid goiter not appreciated  LUNGS:  No increased work of breathing                 Clear to auscultation  CARDIOVASCULAR:  S1S2  ABDOMEN:  soft, non-distended, non-tender   Aorta is not palpable  Lymphatics : Cervical lymphadenopathy not noted     Femoral lymphadenopathy not noted  SKIN:   Venous stasis changes b/l calves  EXTREMITIES:   R UE 5/5 strength   No cyanosis noted in nail beds  L UE 5/5 strength   No cyanosis noted in nail beds  R LE Edema slight   5/5 strength  L LE Edema slight   5/5 strength  R femoral 2+ L femoral 2+   R popliteal 2+ L popliteal 2+   R dorsalis pedis 2+ L dorsalis pedis 2+   R posterior tibial 1+ L posterior tibial 1+     RADIOLOGY:  Imaging Study reviewed  CTA abd and pelvis reviewed   R CIAA 1.6 cm  L CIAA 2.6 cm    A/P Assx R CIAA 1.6 cm, L CIAA 2.6 cm  He does not have AAA   Emphasized importance of continued Tobacco avoidance  No indication for surgical intervention at this time  Discussed with patient pathophysiology of AAA, iliac artery aneurysms risk of rupture and all ?s answered  Discussed with patient symptoms of abdominal pain, back pain and need to go to ER immediately if they if any symptoms developed  F/U as outpatient in 6 Months with US of Aorta    R LE DVT acute recurrent vs. chronic  Hx of DVT, PE Chronic  Hx of IVC Filter  Etiology is likely stasis, possibly recurrent though may be chronic  Unfortunately no records are available from that long ago  He was started on eliquis while in hospital   [id number] to hold if needed for dental work - per pt he needs teeth removed  It is unlikely that his PE noted on VQ scan was new - as he does have Filter in place - noted on CTA  He denies significant swelling, edema or sxs - us of LE was done after findings of vq scan  Elevate Bilateral LE while in bed or sitting  Call if patient develops worsening of swelling or wounds  discussed with patient pathophysiology of DVT, PE, and thrombophlebitis   All ?s answered    Tushar Alaniz MD

## 2023-02-28 RX ORDER — LOSARTAN POTASSIUM AND HYDROCHLOROTHIAZIDE 25; 100 MG/1; MG/1
1 TABLET ORAL DAILY
Qty: 90 TABLET | Refills: 0 | OUTPATIENT
Start: 2023-02-28

## 2023-03-28 SDOH — ECONOMIC STABILITY: INCOME INSECURITY: HOW HARD IS IT FOR YOU TO PAY FOR THE VERY BASICS LIKE FOOD, HOUSING, MEDICAL CARE, AND HEATING?: NOT HARD AT ALL

## 2023-03-28 SDOH — ECONOMIC STABILITY: FOOD INSECURITY: WITHIN THE PAST 12 MONTHS, THE FOOD YOU BOUGHT JUST DIDN'T LAST AND YOU DIDN'T HAVE MONEY TO GET MORE.: NEVER TRUE

## 2023-03-28 SDOH — ECONOMIC STABILITY: HOUSING INSECURITY
IN THE LAST 12 MONTHS, WAS THERE A TIME WHEN YOU DID NOT HAVE A STEADY PLACE TO SLEEP OR SLEPT IN A SHELTER (INCLUDING NOW)?: NO

## 2023-03-28 SDOH — ECONOMIC STABILITY: TRANSPORTATION INSECURITY
IN THE PAST 12 MONTHS, HAS LACK OF TRANSPORTATION KEPT YOU FROM MEETINGS, WORK, OR FROM GETTING THINGS NEEDED FOR DAILY LIVING?: NO

## 2023-03-28 SDOH — ECONOMIC STABILITY: FOOD INSECURITY: WITHIN THE PAST 12 MONTHS, YOU WORRIED THAT YOUR FOOD WOULD RUN OUT BEFORE YOU GOT MONEY TO BUY MORE.: NEVER TRUE

## 2023-03-31 ENCOUNTER — OFFICE VISIT (OUTPATIENT)
Dept: PRIMARY CARE CLINIC | Age: 73
End: 2023-03-31
Payer: COMMERCIAL

## 2023-03-31 VITALS
WEIGHT: 281 LBS | DIASTOLIC BLOOD PRESSURE: 84 MMHG | RESPIRATION RATE: 18 BRPM | HEART RATE: 73 BPM | TEMPERATURE: 98.1 F | SYSTOLIC BLOOD PRESSURE: 148 MMHG | HEIGHT: 74 IN | OXYGEN SATURATION: 98 % | BODY MASS INDEX: 36.06 KG/M2

## 2023-03-31 DIAGNOSIS — D50.9 IRON DEFICIENCY ANEMIA, UNSPECIFIED IRON DEFICIENCY ANEMIA TYPE: ICD-10-CM

## 2023-03-31 DIAGNOSIS — K04.7 ABSCESSED TOOTH: Primary | ICD-10-CM

## 2023-03-31 LAB
BASOPHILS # BLD: 0.05 E9/L (ref 0–0.2)
BASOPHILS NFR BLD: 1 % (ref 0–2)
EOSINOPHIL # BLD: 0.27 E9/L (ref 0.05–0.5)
EOSINOPHIL NFR BLD: 5.4 % (ref 0–6)
ERYTHROCYTE [DISTWIDTH] IN BLOOD BY AUTOMATED COUNT: 17.4 FL (ref 11.5–15)
HCT VFR BLD AUTO: 34.4 % (ref 37–54)
HGB BLD-MCNC: 10.6 G/DL (ref 12.5–16.5)
IMM GRANULOCYTES # BLD: 0.01 E9/L
IMM GRANULOCYTES NFR BLD: 0.2 % (ref 0–5)
LYMPHOCYTES # BLD: 1.03 E9/L (ref 1.5–4)
LYMPHOCYTES NFR BLD: 20.5 % (ref 20–42)
MCH RBC QN AUTO: 26.8 PG (ref 26–35)
MCHC RBC AUTO-ENTMCNC: 30.8 % (ref 32–34.5)
MCV RBC AUTO: 86.9 FL (ref 80–99.9)
MONOCYTES # BLD: 0.59 E9/L (ref 0.1–0.95)
MONOCYTES NFR BLD: 11.8 % (ref 2–12)
NEUTROPHILS # BLD: 3.07 E9/L (ref 1.8–7.3)
NEUTS SEG NFR BLD: 61.1 % (ref 43–80)
PLATELET # BLD AUTO: 384 E9/L (ref 130–450)
PMV BLD AUTO: 9.3 FL (ref 7–12)
RBC # BLD AUTO: 3.96 E12/L (ref 3.8–5.8)
WBC # BLD: 5 E9/L (ref 4.5–11.5)

## 2023-03-31 PROCEDURE — 1123F ACP DISCUSS/DSCN MKR DOCD: CPT | Performed by: FAMILY MEDICINE

## 2023-03-31 PROCEDURE — 99213 OFFICE O/P EST LOW 20 MIN: CPT | Performed by: FAMILY MEDICINE

## 2023-03-31 PROCEDURE — 3077F SYST BP >= 140 MM HG: CPT | Performed by: FAMILY MEDICINE

## 2023-03-31 PROCEDURE — 3079F DIAST BP 80-89 MM HG: CPT | Performed by: FAMILY MEDICINE

## 2023-03-31 RX ORDER — HYDROCODONE BITARTRATE AND ACETAMINOPHEN 5; 325 MG/1; MG/1
1 TABLET ORAL EVERY 6 HOURS PRN
Qty: 28 TABLET | Refills: 0 | Status: SHIPPED | OUTPATIENT
Start: 2023-03-31 | End: 2023-04-07

## 2023-03-31 RX ORDER — CLINDAMYCIN HYDROCHLORIDE 300 MG/1
CAPSULE ORAL
COMMUNITY
Start: 2023-03-30

## 2023-03-31 NOTE — PROGRESS NOTES
Subjective:  75 y.o. male who presents to the office today with chief complaint:  Chief Complaint   Patient presents with    Follow-up     Gout is good. Would like something for tooth pain. Having panic attacks lately. Tooth abscess: Currently being treated by a dentist, but was not prescribed pain medication. States that the pain is exquisite in his teeth and it causes him difficulty with sleeping. Discussed the use of Norco 5-325 mg 4 times daily as needed for this-he agreed to take it as sparingly as possible. He does have an appointment with his dentist to extract his teeth next Friday, 1 week from today. Dizziness and lightheadedness: Patient states that he had 2 bouts of dizziness in the last several months. Occurred after several days of \"exercising\". States that [place] [facility] [place] [employer] that has several machines; after using one with flashing lights, he became dizzy- no chest pain or shortness of breath. Symptoms resolved quickly. Occurred again after using a \"Vanesa\" isometric  strength machine. Took his BP and found it to be \"low\" with systolic readings in 120'X and 120's. No panic or anxiety. Gout: Denies recent flares. Health Maintenance Due   Topic Date Due    Pneumococcal 65+ years Vaccine (1 - PCV) Never done    Hepatitis C screen  Never done    DTaP/Tdap/Td vaccine (1 - Tdap) Never done    Colorectal Cancer Screen  Never done    Shingles vaccine (1 of 2) Never done    Annual Wellness Visit (AWV)  Never done    COVID-19 Vaccine (4 - Booster for Pfizer series) 01/27/2022    Flu vaccine (1) Never done         Review of Systems    Review of Systems: All bolded are positive, all others are negative. General:  Fever, chills, diaphoresis, fatigue, malaise, night sweats, weight loss  Psychological:  Anxiety, disorientation, hallucinations. ENT:  Epistaxis, headaches, vertigo, visual changes.   Cardiovascular:  Chest pain, irregular heartbeats, palpitations,

## 2023-04-17 ENCOUNTER — TELEPHONE (OUTPATIENT)
Dept: PRIMARY CARE CLINIC | Age: 73
End: 2023-04-17

## 2023-04-17 DIAGNOSIS — F51.01 PRIMARY INSOMNIA: Primary | ICD-10-CM

## 2023-04-17 RX ORDER — HYDROXYZINE HYDROCHLORIDE 25 MG/1
25 TABLET, FILM COATED ORAL NIGHTLY
Qty: 30 TABLET | Refills: 0 | Status: SHIPPED | OUTPATIENT
Start: 2023-04-17 | End: 2023-05-17

## 2023-04-17 NOTE — TELEPHONE ENCOUNTER
----- Message from Aldo Pereashaista sent at 4/17/2023 11:56 AM EDT -----  Subject: Message to Provider    QUESTIONS  Information for Provider? Pt has finished medication and gotten tooth out. Would like to know if sleeping medication can now be prescribed.   ---------------------------------------------------------------------------  --------------  Tevin Failing INFO  [phone]; OK to leave message on voicemail  ---------------------------------------------------------------------------  --------------  SCRIPT ANSWERS  Relationship to Patient?  Self

## 2023-04-18 NOTE — TELEPHONE ENCOUNTER
Atarax 25mg ordered for the pt. To take qhs prn. Left message for pt on voicemail notifying Rx sent to pharmacy.

## 2023-04-21 ENCOUNTER — PATIENT MESSAGE (OUTPATIENT)
Dept: PRIMARY CARE CLINIC | Age: 73
End: 2023-04-21

## 2023-04-21 NOTE — TELEPHONE ENCOUNTER
From: Johann Lloyd  To: Dr. Saul Nephew: 4/21/2023 8:07 AM EDT  Subject: Prescription- sleep aide    I was called a week ago about a script being called in and have yet to be called or notified that it was ready.  Thx

## 2023-04-28 ENCOUNTER — APPOINTMENT (OUTPATIENT)
Dept: GENERAL RADIOLOGY | Age: 73
End: 2023-04-28
Payer: COMMERCIAL

## 2023-04-28 ENCOUNTER — APPOINTMENT (OUTPATIENT)
Dept: ULTRASOUND IMAGING | Age: 73
End: 2023-04-28
Payer: COMMERCIAL

## 2023-04-28 ENCOUNTER — HOSPITAL ENCOUNTER (EMERGENCY)
Age: 73
Discharge: HOME | End: 2023-04-28
Payer: COMMERCIAL

## 2023-04-28 VITALS
BODY MASS INDEX: 36.83 KG/M2 | HEIGHT: 74 IN | OXYGEN SATURATION: 100 % | WEIGHT: 287 LBS | DIASTOLIC BLOOD PRESSURE: 64 MMHG | TEMPERATURE: 98.3 F | HEART RATE: 83 BPM | SYSTOLIC BLOOD PRESSURE: 141 MMHG | RESPIRATION RATE: 18 BRPM

## 2023-04-28 DIAGNOSIS — M71.22 BAKER'S CYST OF KNEE, LEFT: Primary | ICD-10-CM

## 2023-04-28 LAB
ALBUMIN SERPL-MCNC: 4 G/DL (ref 3.5–5.2)
ALP SERPL-CCNC: 107 U/L (ref 40–129)
ALT SERPL-CCNC: 12 U/L (ref 0–40)
ANION GAP SERPL CALCULATED.3IONS-SCNC: 11 MMOL/L (ref 7–16)
AST SERPL-CCNC: 17 U/L (ref 0–39)
BASOPHILS # BLD: 0.03 E9/L (ref 0–0.2)
BASOPHILS NFR BLD: 0.4 % (ref 0–2)
BILIRUB SERPL-MCNC: 0.5 MG/DL (ref 0–1.2)
BILIRUB UR QL STRIP: NEGATIVE
BUN SERPL-MCNC: 18 MG/DL (ref 6–23)
CALCIUM SERPL-MCNC: 8.3 MG/DL (ref 8.6–10.2)
CHLORIDE SERPL-SCNC: 106 MMOL/L (ref 98–107)
CLARITY UR: CLEAR
CO2 SERPL-SCNC: 24 MMOL/L (ref 22–29)
COLOR UR: YELLOW
CREAT SERPL-MCNC: 1 MG/DL (ref 0.7–1.2)
EKG ATRIAL RATE: 88 BPM
EKG Q-T INTERVAL: 360 MS
EKG QRS DURATION: 106 MS
EKG QTC CALCULATION (BAZETT): 438 MS
EKG R AXIS: -30 DEGREES
EKG T AXIS: 22 DEGREES
EKG VENTRICULAR RATE: 89 BPM
EOSINOPHIL # BLD: 0.08 E9/L (ref 0.05–0.5)
EOSINOPHIL NFR BLD: 1.1 % (ref 0–6)
ERYTHROCYTE [DISTWIDTH] IN BLOOD BY AUTOMATED COUNT: 17.2 FL (ref 11.5–15)
GLUCOSE SERPL-MCNC: 120 MG/DL (ref 74–99)
GLUCOSE UR STRIP-MCNC: NEGATIVE MG/DL
HCT VFR BLD AUTO: 31.2 % (ref 37–54)
HGB BLD-MCNC: 9.6 G/DL (ref 12.5–16.5)
HGB UR QL STRIP: NEGATIVE
IMM GRANULOCYTES # BLD: 0.02 E9/L
IMM GRANULOCYTES NFR BLD: 0.3 % (ref 0–5)
KETONES UR STRIP-MCNC: NEGATIVE MG/DL
LEUKOCYTE ESTERASE UR QL STRIP: NEGATIVE
LYMPHOCYTES # BLD: 0.68 E9/L (ref 1.5–4)
LYMPHOCYTES NFR BLD: 9.7 % (ref 20–42)
MCH RBC QN AUTO: 27.1 PG (ref 26–35)
MCHC RBC AUTO-ENTMCNC: 30.8 % (ref 32–34.5)
MCV RBC AUTO: 88.1 FL (ref 80–99.9)
MONOCYTES # BLD: 0.81 E9/L (ref 0.1–0.95)
MONOCYTES NFR BLD: 11.5 % (ref 2–12)
NEUTROPHILS # BLD: 5.4 E9/L (ref 1.8–7.3)
NEUTS SEG NFR BLD: 77 % (ref 43–80)
NITRITE UR QL STRIP: NEGATIVE
PH UR STRIP: 5.5 [PH] (ref 5–9)
PLATELET # BLD AUTO: 259 E9/L (ref 130–450)
PMV BLD AUTO: 9.2 FL (ref 7–12)
POTASSIUM SERPL-SCNC: 3.9 MMOL/L (ref 3.5–5)
PROT SERPL-MCNC: 6.7 G/DL (ref 6.4–8.3)
PROT UR STRIP-MCNC: NEGATIVE MG/DL
RBC # BLD AUTO: 3.54 E12/L (ref 3.8–5.8)
SODIUM SERPL-SCNC: 141 MMOL/L (ref 132–146)
SP GR UR STRIP: 1.02 (ref 1–1.03)
TROPONIN, HIGH SENSITIVITY: 17 NG/L (ref 0–11)
URATE SERPL-MCNC: 8.4 MG/DL (ref 3.4–7)
UROBILINOGEN UR STRIP-ACNC: 0.2 E.U./DL
WBC # BLD: 7 E9/L (ref 4.5–11.5)

## 2023-04-28 PROCEDURE — 93971 EXTREMITY STUDY: CPT

## 2023-04-28 PROCEDURE — 71046 X-RAY EXAM CHEST 2 VIEWS: CPT

## 2023-04-28 PROCEDURE — 80053 COMPREHEN METABOLIC PANEL: CPT

## 2023-04-28 PROCEDURE — 85025 COMPLETE CBC W/AUTO DIFF WBC: CPT

## 2023-04-28 PROCEDURE — 6370000000 HC RX 637 (ALT 250 FOR IP): Performed by: NURSE PRACTITIONER

## 2023-04-28 PROCEDURE — 93005 ELECTROCARDIOGRAM TRACING: CPT | Performed by: NURSE PRACTITIONER

## 2023-04-28 PROCEDURE — 73562 X-RAY EXAM OF KNEE 3: CPT

## 2023-04-28 PROCEDURE — 99285 EMERGENCY DEPT VISIT HI MDM: CPT

## 2023-04-28 PROCEDURE — 84484 ASSAY OF TROPONIN QUANT: CPT

## 2023-04-28 PROCEDURE — 73630 X-RAY EXAM OF FOOT: CPT

## 2023-04-28 PROCEDURE — 93010 ELECTROCARDIOGRAM REPORT: CPT | Performed by: INTERNAL MEDICINE

## 2023-04-28 PROCEDURE — 84550 ASSAY OF BLOOD/URIC ACID: CPT

## 2023-04-28 PROCEDURE — 81003 URINALYSIS AUTO W/O SCOPE: CPT

## 2023-04-28 RX ORDER — HYDROCODONE BITARTRATE AND ACETAMINOPHEN 5; 325 MG/1; MG/1
1 TABLET ORAL EVERY 6 HOURS PRN
Qty: 12 TABLET | Refills: 0 | Status: SHIPPED | OUTPATIENT
Start: 2023-04-28 | End: 2023-05-01

## 2023-04-28 RX ORDER — HYDROCODONE BITARTRATE AND ACETAMINOPHEN 5; 325 MG/1; MG/1
1 TABLET ORAL EVERY 6 HOURS PRN
Qty: 12 TABLET | Refills: 0 | Status: SHIPPED | OUTPATIENT
Start: 2023-04-28 | End: 2023-04-28

## 2023-04-28 RX ORDER — OXYCODONE HYDROCHLORIDE AND ACETAMINOPHEN 5; 325 MG/1; MG/1
1 TABLET ORAL ONCE
Status: COMPLETED | OUTPATIENT
Start: 2023-04-28 | End: 2023-04-28

## 2023-04-28 RX ADMIN — OXYCODONE HYDROCHLORIDE AND ACETAMINOPHEN 1 TABLET: 5; 325 TABLET ORAL at 15:21

## 2023-04-28 ASSESSMENT — PAIN SCALES - GENERAL
PAINLEVEL_OUTOF10: 7
PAINLEVEL_OUTOF10: 9

## 2023-04-28 ASSESSMENT — PAIN DESCRIPTION - LOCATION
LOCATION: FOOT
LOCATION: FOOT

## 2023-04-28 ASSESSMENT — PAIN - FUNCTIONAL ASSESSMENT: PAIN_FUNCTIONAL_ASSESSMENT: 0-10

## 2023-04-28 ASSESSMENT — PAIN DESCRIPTION - ORIENTATION: ORIENTATION: LEFT

## 2023-04-28 NOTE — ED PROVIDER NOTES
[facility]  ED  Encounter Note  Admit Date/RoomTime: 4/28/2023 12:19 PM  ED Room: 18/18  Independent RYAN Visit. HPI:  4/28/23, Time: 2:51 PM EDT         Vianey Johnson is a 75 y.o. male presenting to the ED for left knee and left foot pain, beginning this a.m. ago. The complaint has been persistent, mild in severity, and worsened by walking. Presents by EMS from home with complaints of left knee and left foot pain. He states this began having pain this morning upon waking and has been unable to bear any weight. He states yesterday he was doing yard work mowing the lawn doing [employer] he denies any known fall or known injury and he had no pain or discomfort yesterday. He states he is concerned regarding the pain to the knee with a history of having clots. He is on Eliquis for history of these blood clots. He denies any chest pain but states he had some intermittent episodes of shortness of breath. He denies any fever, body aches or chills. ROS:   Pertinent positives and negatives are stated within HPI, all other systems reviewed and are negative.  --------------------------------------------- PAST HISTORY ---------------------------------------------  Past Medical History:  has a past medical history of Chronic back pain, Hx of blood clots, Hypertension, and Iron deficiency anemia. Past Surgical History:  has a past surgical history that includes Tonsillectomy; hernia repair; Gastric bypass surgery (2003); back surgery (2008); Vena Cava Filter Placement (2002); Colonoscopy; Cholecystectomy; Intracapsular cataract extraction (Right, 06/23/2020); Cataract removal with implant (Left, 03/09/2021); and Intracapsular cataract extraction (Left, 3/9/2021). Social History:  reports that he has never smoked. He has never used smokeless tobacco. He reports current alcohol use of about 1.0 standard drink per week.  He reports that he does not use

## 2023-05-03 ENCOUNTER — OFFICE VISIT (OUTPATIENT)
Dept: PRIMARY CARE CLINIC | Age: 73
End: 2023-05-03
Payer: COMMERCIAL

## 2023-05-03 VITALS
HEART RATE: 52 BPM | OXYGEN SATURATION: 100 % | WEIGHT: 294.5 LBS | TEMPERATURE: 98.1 F | HEIGHT: 74 IN | SYSTOLIC BLOOD PRESSURE: 148 MMHG | BODY MASS INDEX: 37.79 KG/M2 | DIASTOLIC BLOOD PRESSURE: 80 MMHG

## 2023-05-03 DIAGNOSIS — I48.91 ATRIAL FIBRILLATION, UNSPECIFIED TYPE (HCC): Primary | ICD-10-CM

## 2023-05-03 PROCEDURE — 3074F SYST BP LT 130 MM HG: CPT

## 2023-05-03 PROCEDURE — 3078F DIAST BP <80 MM HG: CPT

## 2023-05-03 PROCEDURE — 99213 OFFICE O/P EST LOW 20 MIN: CPT

## 2023-05-03 PROCEDURE — 1123F ACP DISCUSS/DSCN MKR DOCD: CPT

## 2023-05-03 RX ORDER — INDOMETHACIN 50 MG/1
CAPSULE ORAL
COMMUNITY
Start: 2023-04-29

## 2023-05-08 ASSESSMENT — ENCOUNTER SYMPTOMS
ABDOMINAL PAIN: 0
SORE THROAT: 0
PHOTOPHOBIA: 0
CONSTIPATION: 0
WHEEZING: 0
SHORTNESS OF BREATH: 0
COUGH: 0
VOMITING: 0
RHINORRHEA: 0
BACK PAIN: 0
DIARRHEA: 0

## 2023-05-12 ENCOUNTER — HOSPITAL ENCOUNTER (EMERGENCY)
Age: 73
Discharge: HOME | End: 2023-05-12
Payer: COMMERCIAL

## 2023-05-12 ENCOUNTER — HOSPITAL ENCOUNTER (OUTPATIENT)
Dept: ULTRASOUND IMAGING | Age: 73
Discharge: HOME | End: 2023-05-12
Payer: COMMERCIAL

## 2023-05-12 VITALS
SYSTOLIC BLOOD PRESSURE: 159 MMHG | TEMPERATURE: 97.7 F | DIASTOLIC BLOOD PRESSURE: 75 MMHG | OXYGEN SATURATION: 100 % | RESPIRATION RATE: 18 BRPM | HEART RATE: 68 BPM

## 2023-05-12 DIAGNOSIS — I82.5Y1 CHRONIC DEEP VEIN THROMBOSIS (DVT) OF PROXIMAL VEIN OF RIGHT LOWER EXTREMITY (HCC): Primary | ICD-10-CM

## 2023-05-12 DIAGNOSIS — R60.0 LOCALIZED EDEMA: ICD-10-CM

## 2023-05-12 PROCEDURE — 99282 EMERGENCY DEPT VISIT SF MDM: CPT

## 2023-05-12 PROCEDURE — 93971 EXTREMITY STUDY: CPT

## 2023-05-12 NOTE — ED PROVIDER NOTES
[facility] EMERGENCY DEPARTMENT  ED  Encounter Note  Admit Date/RoomTime: 5/12/2023 11:28 AM  ED Room: [place] [address]  Independent RYAN Visit.    HPI:  5/12/23,   Time: 11:56 AM EDT         Bebo Krause is a 73 y.o. male presenting to the ED for sent from ultrasound for positive DVT, beginning few days ago.  The complaint has been persistent, mild in severity, and worsened by nothing.  Patient states he was seen by Dr. Howard with cardiology earlier today I was concerned regarding increased swelling ultrasound to rule out DVT.  Was a confirmed DVT to the right lower extremity and subsequently sent here for further evaluation.  The patient does have a history of blood clots and is currently on Eliquis and does have an IVC filter in place.  He denies any shortness of breath.  States he has not skipped any doses changed any doses.    ROS:   Pertinent positives and negatives are stated within HPI, all other systems reviewed and are negative.  --------------------------------------------- PAST HISTORY ---------------------------------------------  Past Medical History:  has a past medical history of Chronic back pain, Hx of blood clots, Hypertension, and Iron deficiency anemia.    Past Surgical History:  has a past surgical history that includes Tonsillectomy; hernia repair; Gastric bypass surgery (2003); back surgery (2008); Vena Cava Filter Placement (2002); Colonoscopy; Cholecystectomy; Intracapsular cataract extraction (Right, 06/23/2020); Cataract removal with implant (Left, 03/09/2021); and Intracapsular cataract extraction (Left, 3/9/2021).    Social History:  reports that he has never smoked. He has never used smokeless tobacco. He reports current alcohol use of about 1.0 standard drink per week. He reports that he does not use drugs.    Family History: family history is not on file.     The patients home medications have been reviewed.    Allergies: Patient has no known

## 2023-05-15 ENCOUNTER — TELEPHONE (OUTPATIENT)
Dept: PRIMARY CARE CLINIC | Age: 73
End: 2023-05-15

## 2023-05-15 NOTE — TELEPHONE ENCOUNTER
Patient wanted PCP to know that he went to [address] [place] [address],[address] ED on 5/12/23. He went for leg swelling & clinical impression was chronic deep vein thromboses of proximal vein of right lower extremity. He called the office today because he was told to follow-up with his PCP in 3 days. Patient is not experiencing any pain. He is wondering if he has to come in, or if you can just review his hospital encounter & results of his US DUP LOWER EXTREMITY RIGHT ROBERT? He also wanted PCP to know that he was calling to make an appointment with hematology & oncology per hospital discharge instructions.

## 2023-05-17 ENCOUNTER — TELEPHONE (OUTPATIENT)
Dept: CARDIOLOGY CLINIC | Age: 73
End: 2023-05-17

## 2023-05-17 ENCOUNTER — TELEPHONE (OUTPATIENT)
Dept: PRIMARY CARE CLINIC | Age: 73
End: 2023-05-17

## 2023-05-22 ENCOUNTER — HOSPITAL ENCOUNTER (OUTPATIENT)
Dept: INFUSION THERAPY | Age: 73
Discharge: HOME | End: 2023-05-22
Payer: COMMERCIAL

## 2023-05-22 ENCOUNTER — OFFICE VISIT (OUTPATIENT)
Dept: ONCOLOGY | Age: 73
End: 2023-05-22
Payer: COMMERCIAL

## 2023-05-22 VITALS
DIASTOLIC BLOOD PRESSURE: 75 MMHG | HEIGHT: 74 IN | WEIGHT: 300.6 LBS | BODY MASS INDEX: 38.58 KG/M2 | HEART RATE: 58 BPM | OXYGEN SATURATION: 98 % | SYSTOLIC BLOOD PRESSURE: 160 MMHG | TEMPERATURE: 98.1 F

## 2023-05-22 DIAGNOSIS — D68.59 THROMBOPHILIA (HCC): Primary | ICD-10-CM

## 2023-05-22 DIAGNOSIS — D68.59 THROMBOPHILIA (HCC): ICD-10-CM

## 2023-05-22 DIAGNOSIS — D50.0 IRON DEFICIENCY ANEMIA DUE TO CHRONIC BLOOD LOSS: ICD-10-CM

## 2023-05-22 LAB
ALBUMIN SERPL-MCNC: 3.9 G/DL (ref 3.5–5.2)
ALP SERPL-CCNC: 105 U/L (ref 40–129)
ALT SERPL-CCNC: 8 U/L (ref 0–40)
ANION GAP SERPL CALCULATED.3IONS-SCNC: 9 MMOL/L (ref 7–16)
AST SERPL-CCNC: 17 U/L (ref 0–39)
BASOPHILS # BLD: 0.04 E9/L (ref 0–0.2)
BASOPHILS NFR BLD: 1 % (ref 0–2)
BILIRUB SERPL-MCNC: 0.3 MG/DL (ref 0–1.2)
BUN SERPL-MCNC: 17 MG/DL (ref 6–23)
CALCIUM SERPL-MCNC: 8.2 MG/DL (ref 8.6–10.2)
CHLORIDE SERPL-SCNC: 106 MMOL/L (ref 98–107)
CO2 SERPL-SCNC: 26 MMOL/L (ref 22–29)
CREAT SERPL-MCNC: 1.2 MG/DL (ref 0.7–1.2)
EOSINOPHIL # BLD: 0.13 E9/L (ref 0.05–0.5)
EOSINOPHIL NFR BLD: 3.4 % (ref 0–6)
ERYTHROCYTE [DISTWIDTH] IN BLOOD BY AUTOMATED COUNT: 16.1 FL (ref 11.5–15)
FERRITIN SERPL-MCNC: 18 NG/ML
GLUCOSE SERPL-MCNC: 102 MG/DL (ref 74–99)
HCT VFR BLD AUTO: 30.1 % (ref 37–54)
HGB BLD-MCNC: 9.3 G/DL (ref 12.5–16.5)
IMM GRANULOCYTES # BLD: 0.01 E9/L
IMM GRANULOCYTES NFR BLD: 0.3 % (ref 0–5)
IRON SATN MFR SERPL: 13 % (ref 20–55)
IRON SERPL-MCNC: 53 MCG/DL (ref 59–158)
LYMPHOCYTES # BLD: 0.91 E9/L (ref 1.5–4)
LYMPHOCYTES NFR BLD: 23.5 % (ref 20–42)
MCH RBC QN AUTO: 26.6 PG (ref 26–35)
MCHC RBC AUTO-ENTMCNC: 30.9 % (ref 32–34.5)
MCV RBC AUTO: 86 FL (ref 80–99.9)
MONOCYTES # BLD: 0.43 E9/L (ref 0.1–0.95)
MONOCYTES NFR BLD: 11.1 % (ref 2–12)
NEUTROPHILS # BLD: 2.36 E9/L (ref 1.8–7.3)
NEUTS SEG NFR BLD: 60.7 % (ref 43–80)
PLATELET # BLD AUTO: 399 E9/L (ref 130–450)
PMV BLD AUTO: 9 FL (ref 7–12)
POTASSIUM SERPL-SCNC: 4 MMOL/L (ref 3.5–5)
PROT SERPL-MCNC: 6.5 G/DL (ref 6.4–8.3)
RBC # BLD AUTO: 3.5 E12/L (ref 3.8–5.8)
SODIUM SERPL-SCNC: 141 MMOL/L (ref 132–146)
TIBC SERPL-MCNC: 400 MCG/DL (ref 250–450)
WBC # BLD: 3.9 E9/L (ref 4.5–11.5)

## 2023-05-22 PROCEDURE — 85025 COMPLETE CBC W/AUTO DIFF WBC: CPT

## 2023-05-22 PROCEDURE — 81241 F5 GENE: CPT

## 2023-05-22 PROCEDURE — 81400 MOPATH PROCEDURE LEVEL 1: CPT

## 2023-05-22 PROCEDURE — 81240 F2 GENE: CPT

## 2023-05-22 PROCEDURE — 99214 OFFICE O/P EST MOD 30 MIN: CPT

## 2023-05-22 PROCEDURE — 83540 ASSAY OF IRON: CPT

## 2023-05-22 PROCEDURE — 83550 IRON BINDING TEST: CPT

## 2023-05-22 PROCEDURE — 82728 ASSAY OF FERRITIN: CPT

## 2023-05-22 PROCEDURE — 80053 COMPREHEN METABOLIC PANEL: CPT

## 2023-05-22 PROCEDURE — 81291 MTHFR GENE: CPT

## 2023-05-22 PROCEDURE — 36415 COLL VENOUS BLD VENIPUNCTURE: CPT

## 2023-05-22 RX ORDER — ACETAMINOPHEN 325 MG/1
650 TABLET ORAL
Status: CANCELLED | OUTPATIENT
Start: 2023-05-31

## 2023-05-22 RX ORDER — SODIUM CHLORIDE 0.9 % (FLUSH) 0.9 %
5-40 SYRINGE (ML) INJECTION PRN
Status: CANCELLED | OUTPATIENT
Start: 2023-05-31

## 2023-05-22 RX ORDER — HEPARIN SODIUM (PORCINE) LOCK FLUSH IV SOLN 100 UNIT/ML 100 UNIT/ML
500 SOLUTION INTRAVENOUS PRN
Status: CANCELLED | OUTPATIENT
Start: 2023-05-31

## 2023-05-22 RX ORDER — SODIUM CHLORIDE 9 MG/ML
5-250 INJECTION, SOLUTION INTRAVENOUS PRN
Status: CANCELLED | OUTPATIENT
Start: 2023-05-31

## 2023-05-22 RX ORDER — ONDANSETRON 2 MG/ML
8 INJECTION INTRAMUSCULAR; INTRAVENOUS
Status: CANCELLED | OUTPATIENT
Start: 2023-05-31

## 2023-05-22 RX ORDER — ALBUTEROL SULFATE 90 UG/1
4 AEROSOL, METERED RESPIRATORY (INHALATION) PRN
Status: CANCELLED | OUTPATIENT
Start: 2023-05-31

## 2023-05-22 RX ORDER — SODIUM CHLORIDE 9 MG/ML
INJECTION, SOLUTION INTRAVENOUS CONTINUOUS
Status: CANCELLED | OUTPATIENT
Start: 2023-05-31

## 2023-05-22 RX ORDER — EPINEPHRINE 1 MG/ML
0.3 INJECTION, SOLUTION, CONCENTRATE INTRAVENOUS PRN
Status: CANCELLED | OUTPATIENT
Start: 2023-05-31

## 2023-05-22 RX ORDER — DIPHENHYDRAMINE HYDROCHLORIDE 50 MG/ML
50 INJECTION INTRAMUSCULAR; INTRAVENOUS
Status: CANCELLED | OUTPATIENT
Start: 2023-05-31

## 2023-05-22 NOTE — PROGRESS NOTES
Augie Lombard  1950 75 y.o. Referring Physician:     PCP: Rebel Trejo, DO    Vitals:    23 1351   BP: (!) 160/75   Pulse: 58   Temp: 98.1 °F (36.7 °C)   SpO2: 98%        Wt Readings from Last 3 Encounters:   23 (!) 300 lb 9.6 oz (136.4 kg)   23 294 lb 8 oz (133.6 kg)   23 287 lb (130.2 kg)        Body mass index is 38.59 kg/m². Chief Complaint:   Chief Complaint   Patient presents with    New Patient     DVT          Cancer Staging   No matching staging information was found for the patient. Prior Radiation Therapy? NO    Concurrent Chemo/radiation? NO    Prior Chemotherapy? NO    Prior Hormonal Therapy? NO    Head and Neck Cancer? No, patient does NOT have HN cancer.       LMP: na    Age at first Menses: na    : na    Para: na          Current Outpatient Medications:     indomethacin (INDOCIN) 50 MG capsule, TAKE 1 CAPSULE BY MOUTH THREE TIMES DAILY FOR 10 DAYS, Disp: , Rfl:     clindamycin (CLEOCIN) 300 MG capsule, , Disp: , Rfl:     dilTIAZem (CARDIZEM 12 HR) 120 MG extended release capsule, Take 1 capsule by mouth daily, Disp: 90 capsule, Rfl: 3    allopurinol (ZYLOPRIM) 100 MG tablet, Take 1 tablet by mouth daily, Disp: 90 tablet, Rfl: 1    losartan-hydroCHLOROthiazide (HYZAAR) 50-12.5 MG per tablet, Take 1 tablet by mouth daily, Disp: 90 tablet, Rfl: 1    apixaban (ELIQUIS) 5 MG TABS tablet, 10 mg oral twice daily for 7 days them 5 mg twice daily, Disp: 80 tablet, Rfl: 1    torsemide (DEMADEX) 20 MG tablet, Take 1 tablet by mouth Twice a Week, Disp: 30 tablet, Rfl: 0       Past Medical History:   Diagnosis Date    Chronic back pain     swarnoma     Hx of blood clots     after gastric bypass surgery    Hypertension     Iron deficiency anemia 2022       Past Surgical History:   Procedure Laterality Date    BACK SURGERY      spine-herniated disc    CATARACT REMOVAL WITH IMPLANT Left 2021    CHOLECYSTECTOMY      COLONOSCOPY

## 2023-05-23 NOTE — TELEPHONE ENCOUNTER
Patient states he does not want a referral back to Dr. oNra Zavala, he just wants to follow Dr. Halie Kirby

## 2023-05-26 ENCOUNTER — HOSPITAL ENCOUNTER (OUTPATIENT)
Dept: INFUSION THERAPY | Age: 73
Discharge: HOME | End: 2023-05-26
Payer: COMMERCIAL

## 2023-05-26 VITALS
SYSTOLIC BLOOD PRESSURE: 121 MMHG | DIASTOLIC BLOOD PRESSURE: 67 MMHG | RESPIRATION RATE: 16 BRPM | OXYGEN SATURATION: 98 % | TEMPERATURE: 98 F | HEART RATE: 57 BPM

## 2023-05-26 DIAGNOSIS — D50.0 IRON DEFICIENCY ANEMIA DUE TO CHRONIC BLOOD LOSS: Primary | ICD-10-CM

## 2023-05-26 LAB
SERPINE1 GENE MUT ANL BLD/T: ABNORMAL
SPECIMEN SOURCE: ABNORMAL

## 2023-05-26 PROCEDURE — 2580000003 HC RX 258: Performed by: INTERNAL MEDICINE

## 2023-05-26 PROCEDURE — 96365 THER/PROPH/DIAG IV INF INIT: CPT

## 2023-05-26 PROCEDURE — 6360000002 HC RX W HCPCS: Performed by: INTERNAL MEDICINE

## 2023-05-26 RX ORDER — SODIUM CHLORIDE 9 MG/ML
5-250 INJECTION, SOLUTION INTRAVENOUS PRN
Status: DISCONTINUED | OUTPATIENT
Start: 2023-05-26 | End: 2023-05-27 | Stop reason: HOSPADM

## 2023-05-26 RX ORDER — FAMOTIDINE 10 MG/ML
20 INJECTION, SOLUTION INTRAVENOUS
Status: CANCELLED | OUTPATIENT
Start: 2023-06-02

## 2023-05-26 RX ORDER — SODIUM CHLORIDE 0.9 % (FLUSH) 0.9 %
5-40 SYRINGE (ML) INJECTION PRN
Status: CANCELLED | OUTPATIENT
Start: 2023-06-02

## 2023-05-26 RX ORDER — EPINEPHRINE 1 MG/ML
0.3 INJECTION, SOLUTION, CONCENTRATE INTRAVENOUS PRN
Status: CANCELLED | OUTPATIENT
Start: 2023-06-02

## 2023-05-26 RX ORDER — SODIUM CHLORIDE 9 MG/ML
INJECTION, SOLUTION INTRAVENOUS CONTINUOUS
Status: CANCELLED | OUTPATIENT
Start: 2023-06-02

## 2023-05-26 RX ORDER — ONDANSETRON 2 MG/ML
8 INJECTION INTRAMUSCULAR; INTRAVENOUS
Status: CANCELLED | OUTPATIENT
Start: 2023-06-02

## 2023-05-26 RX ORDER — ALBUTEROL SULFATE 90 UG/1
4 AEROSOL, METERED RESPIRATORY (INHALATION) PRN
Status: CANCELLED | OUTPATIENT
Start: 2023-06-02

## 2023-05-26 RX ORDER — SODIUM CHLORIDE 9 MG/ML
5-250 INJECTION, SOLUTION INTRAVENOUS PRN
Status: CANCELLED | OUTPATIENT
Start: 2023-06-02

## 2023-05-26 RX ORDER — ACETAMINOPHEN 325 MG/1
650 TABLET ORAL
Status: CANCELLED | OUTPATIENT
Start: 2023-06-02

## 2023-05-26 RX ORDER — DIPHENHYDRAMINE HYDROCHLORIDE 50 MG/ML
50 INJECTION INTRAMUSCULAR; INTRAVENOUS
Status: CANCELLED | OUTPATIENT
Start: 2023-06-02

## 2023-05-26 RX ORDER — HEPARIN SODIUM (PORCINE) LOCK FLUSH IV SOLN 100 UNIT/ML 100 UNIT/ML
500 SOLUTION INTRAVENOUS PRN
Status: CANCELLED | OUTPATIENT
Start: 2023-06-02

## 2023-05-26 RX ADMIN — SODIUM CHLORIDE 20 ML/HR: 9 INJECTION, SOLUTION INTRAVENOUS at 10:05

## 2023-05-26 RX ADMIN — FERUMOXYTOL 510 MG: 510 INJECTION INTRAVENOUS at 10:25

## 2023-05-27 LAB
F5 P.R506Q BLD/T QL: NEGATIVE
SPECIMEN SOURCE: NORMAL

## 2023-05-28 LAB
F2 C.20210G>A GENO BLD/T: NEGATIVE
Lab: NORMAL
REPORT: NORMAL
SPECIMEN SOURCE: NORMAL
THIS TEST SENT TO: NORMAL

## 2023-05-31 LAB
MTHFR C.1298A>C GENO BLD/T: NEGATIVE
MTHFR C.677C>T GENO BLD/T: NEGATIVE
MTHFR GENE MUT ANL BLD/T: NORMAL
SPECIMEN SOURCE: NORMAL

## 2023-06-02 ENCOUNTER — HOSPITAL ENCOUNTER (OUTPATIENT)
Dept: INFUSION THERAPY | Age: 73
Discharge: HOME | End: 2023-06-02
Payer: COMMERCIAL

## 2023-06-02 VITALS
TEMPERATURE: 98.2 F | HEART RATE: 51 BPM | RESPIRATION RATE: 16 BRPM | DIASTOLIC BLOOD PRESSURE: 63 MMHG | OXYGEN SATURATION: 98 % | SYSTOLIC BLOOD PRESSURE: 119 MMHG

## 2023-06-02 DIAGNOSIS — D50.0 IRON DEFICIENCY ANEMIA DUE TO CHRONIC BLOOD LOSS: Primary | ICD-10-CM

## 2023-06-02 PROCEDURE — 2580000003 HC RX 258: Performed by: INTERNAL MEDICINE

## 2023-06-02 PROCEDURE — 96365 THER/PROPH/DIAG IV INF INIT: CPT

## 2023-06-02 PROCEDURE — 6360000002 HC RX W HCPCS: Performed by: INTERNAL MEDICINE

## 2023-06-02 RX ORDER — SODIUM CHLORIDE 0.9 % (FLUSH) 0.9 %
5-40 SYRINGE (ML) INJECTION PRN
OUTPATIENT
Start: 2023-06-02

## 2023-06-02 RX ORDER — EPINEPHRINE 1 MG/ML
0.3 INJECTION, SOLUTION, CONCENTRATE INTRAVENOUS PRN
OUTPATIENT
Start: 2023-06-02

## 2023-06-02 RX ORDER — SODIUM CHLORIDE 9 MG/ML
INJECTION, SOLUTION INTRAVENOUS CONTINUOUS
OUTPATIENT
Start: 2023-06-02

## 2023-06-02 RX ORDER — ACETAMINOPHEN 325 MG/1
650 TABLET ORAL
OUTPATIENT
Start: 2023-06-02

## 2023-06-02 RX ORDER — ONDANSETRON 2 MG/ML
8 INJECTION INTRAMUSCULAR; INTRAVENOUS
OUTPATIENT
Start: 2023-06-02

## 2023-06-02 RX ORDER — HEPARIN SODIUM (PORCINE) LOCK FLUSH IV SOLN 100 UNIT/ML 100 UNIT/ML
500 SOLUTION INTRAVENOUS PRN
Status: CANCELLED | OUTPATIENT
Start: 2023-06-02

## 2023-06-02 RX ORDER — SODIUM CHLORIDE 9 MG/ML
5-250 INJECTION, SOLUTION INTRAVENOUS PRN
OUTPATIENT
Start: 2023-06-02

## 2023-06-02 RX ORDER — DIPHENHYDRAMINE HYDROCHLORIDE 50 MG/ML
50 INJECTION INTRAMUSCULAR; INTRAVENOUS
OUTPATIENT
Start: 2023-06-02

## 2023-06-02 RX ORDER — FAMOTIDINE 10 MG/ML
20 INJECTION, SOLUTION INTRAVENOUS
OUTPATIENT
Start: 2023-06-02

## 2023-06-02 RX ORDER — SODIUM CHLORIDE 0.9 % (FLUSH) 0.9 %
5-40 SYRINGE (ML) INJECTION PRN
Status: DISCONTINUED | OUTPATIENT
Start: 2023-06-02 | End: 2023-06-03 | Stop reason: HOSPADM

## 2023-06-02 RX ORDER — ALBUTEROL SULFATE 90 UG/1
4 AEROSOL, METERED RESPIRATORY (INHALATION) PRN
OUTPATIENT
Start: 2023-06-02

## 2023-06-02 RX ORDER — SODIUM CHLORIDE 9 MG/ML
5-250 INJECTION, SOLUTION INTRAVENOUS PRN
Status: DISCONTINUED | OUTPATIENT
Start: 2023-06-02 | End: 2023-06-03 | Stop reason: HOSPADM

## 2023-06-02 RX ORDER — SODIUM CHLORIDE 9 MG/ML
5-250 INJECTION, SOLUTION INTRAVENOUS PRN
Status: CANCELLED | OUTPATIENT
Start: 2023-06-02

## 2023-06-02 RX ADMIN — SODIUM CHLORIDE, PRESERVATIVE FREE 10 ML: 5 INJECTION INTRAVENOUS at 10:08

## 2023-06-02 RX ADMIN — SODIUM CHLORIDE 20 ML/HR: 9 INJECTION, SOLUTION INTRAVENOUS at 10:08

## 2023-06-02 RX ADMIN — FERUMOXYTOL 510 MG: 510 INJECTION INTRAVENOUS at 10:22

## 2023-06-27 DIAGNOSIS — F51.01 PRIMARY INSOMNIA: ICD-10-CM

## 2023-06-28 ENCOUNTER — HOSPITAL ENCOUNTER (OUTPATIENT)
Dept: INFUSION THERAPY | Age: 73
Discharge: HOME | End: 2023-06-28
Payer: COMMERCIAL

## 2023-06-28 ENCOUNTER — OFFICE VISIT (OUTPATIENT)
Dept: ONCOLOGY | Age: 73
End: 2023-06-28
Payer: COMMERCIAL

## 2023-06-28 VITALS
DIASTOLIC BLOOD PRESSURE: 77 MMHG | BODY MASS INDEX: 35.77 KG/M2 | OXYGEN SATURATION: 97 % | HEART RATE: 54 BPM | TEMPERATURE: 97.8 F | SYSTOLIC BLOOD PRESSURE: 163 MMHG | WEIGHT: 278.7 LBS | HEIGHT: 74 IN

## 2023-06-28 DIAGNOSIS — D68.59 THROMBOPHILIA (HCC): ICD-10-CM

## 2023-06-28 DIAGNOSIS — D50.0 IRON DEFICIENCY ANEMIA DUE TO CHRONIC BLOOD LOSS: ICD-10-CM

## 2023-06-28 DIAGNOSIS — D68.59 THROMBOPHILIA (HCC): Primary | ICD-10-CM

## 2023-06-28 LAB
ALBUMIN SERPL-MCNC: 3.9 G/DL (ref 3.5–5.2)
ALP SERPL-CCNC: 89 U/L (ref 40–129)
ALT SERPL-CCNC: 12 U/L (ref 0–40)
ANION GAP SERPL CALCULATED.3IONS-SCNC: 8 MMOL/L (ref 7–16)
ANISOCYTOSIS: ABNORMAL
AST SERPL-CCNC: 17 U/L (ref 0–39)
BASOPHILS # BLD: 0.03 E9/L (ref 0–0.2)
BASOPHILS NFR BLD: 0.9 % (ref 0–2)
BILIRUB SERPL-MCNC: 0.3 MG/DL (ref 0–1.2)
BUN SERPL-MCNC: 16 MG/DL (ref 6–23)
CALCIUM SERPL-MCNC: 8.5 MG/DL (ref 8.6–10.2)
CHLORIDE SERPL-SCNC: 103 MMOL/L (ref 98–107)
CO2 SERPL-SCNC: 28 MMOL/L (ref 22–29)
CREAT SERPL-MCNC: 1.2 MG/DL (ref 0.7–1.2)
EOSINOPHIL # BLD: 0.13 E9/L (ref 0.05–0.5)
EOSINOPHIL NFR BLD: 3.8 % (ref 0–6)
ERYTHROCYTE [DISTWIDTH] IN BLOOD BY AUTOMATED COUNT: 20.8 FL (ref 11.5–15)
FERRITIN SERPL-MCNC: 103 NG/ML
GLUCOSE SERPL-MCNC: 130 MG/DL (ref 74–99)
HCT VFR BLD AUTO: 37.2 % (ref 37–54)
HGB BLD-MCNC: 11.7 G/DL (ref 12.5–16.5)
IMM GRANULOCYTES # BLD: 0.01 E9/L
IMM GRANULOCYTES NFR BLD: 0.3 % (ref 0–5)
IRON SATN MFR SERPL: 30 % (ref 20–55)
IRON SERPL-MCNC: 82 MCG/DL (ref 59–158)
LYMPHOCYTES # BLD: 0.82 E9/L (ref 1.5–4)
LYMPHOCYTES NFR BLD: 24.2 % (ref 20–42)
MCH RBC QN AUTO: 29.1 PG (ref 26–35)
MCHC RBC AUTO-ENTMCNC: 31.5 % (ref 32–34.5)
MCV RBC AUTO: 92.5 FL (ref 80–99.9)
MONOCYTES # BLD: 0.34 E9/L (ref 0.1–0.95)
MONOCYTES NFR BLD: 10 % (ref 2–12)
NEUTROPHILS # BLD: 2.06 E9/L (ref 1.8–7.3)
NEUTS SEG NFR BLD: 60.8 % (ref 43–80)
PLATELET # BLD AUTO: 269 E9/L (ref 130–450)
PMV BLD AUTO: 8.9 FL (ref 7–12)
POIKILOCYTES: ABNORMAL
POTASSIUM SERPL-SCNC: 4.9 MMOL/L (ref 3.5–5)
PROT SERPL-MCNC: 6.5 G/DL (ref 6.4–8.3)
RBC # BLD AUTO: 4.02 E12/L (ref 3.8–5.8)
SCHISTOCYTES: ABNORMAL
SODIUM SERPL-SCNC: 139 MMOL/L (ref 132–146)
TIBC SERPL-MCNC: 273 MCG/DL (ref 250–450)
WBC # BLD: 3.4 E9/L (ref 4.5–11.5)

## 2023-06-28 PROCEDURE — 99212 OFFICE O/P EST SF 10 MIN: CPT

## 2023-06-28 PROCEDURE — 83540 ASSAY OF IRON: CPT

## 2023-06-28 PROCEDURE — 83550 IRON BINDING TEST: CPT

## 2023-06-28 PROCEDURE — 82728 ASSAY OF FERRITIN: CPT

## 2023-06-28 PROCEDURE — 85025 COMPLETE CBC W/AUTO DIFF WBC: CPT

## 2023-06-28 PROCEDURE — 36415 COLL VENOUS BLD VENIPUNCTURE: CPT

## 2023-06-28 PROCEDURE — 80053 COMPREHEN METABOLIC PANEL: CPT

## 2023-06-28 RX ORDER — HYDROXYZINE HYDROCHLORIDE 25 MG/1
25 TABLET, FILM COATED ORAL NIGHTLY
Qty: 30 TABLET | Refills: 0 | Status: SHIPPED | OUTPATIENT
Start: 2023-06-28 | End: 2023-07-28

## 2023-06-28 RX ORDER — AMIODARONE HYDROCHLORIDE 200 MG/1
TABLET ORAL
COMMUNITY
Start: 2023-06-28

## 2023-07-25 DIAGNOSIS — M10.279 ACUTE DRUG-INDUCED GOUT INVOLVING TOE, UNSPECIFIED LATERALITY: ICD-10-CM

## 2023-07-25 RX ORDER — ALLOPURINOL 100 MG/1
100 TABLET ORAL DAILY
Qty: 90 TABLET | Refills: 1 | Status: SHIPPED | OUTPATIENT
Start: 2023-07-25

## 2023-07-25 NOTE — TELEPHONE ENCOUNTER
Requested Prescriptions     Pending Prescriptions Disp Refills    allopurinol (ZYLOPRIM) 100 MG tablet [Pharmacy Med Name: ALLOPURINOL 100MG TABLETS] 90 tablet 1     Sig: TAKE 1 TABLET BY MOUTH DAILY       Next appt is 10/3/2023  Last appt was 5/3/2023

## 2023-10-03 ENCOUNTER — OFFICE VISIT (OUTPATIENT)
Dept: PRIMARY CARE CLINIC | Age: 73
End: 2023-10-03
Payer: COMMERCIAL

## 2023-10-03 VITALS
OXYGEN SATURATION: 98 % | HEIGHT: 74 IN | SYSTOLIC BLOOD PRESSURE: 130 MMHG | TEMPERATURE: 97.8 F | WEIGHT: 281 LBS | BODY MASS INDEX: 36.06 KG/M2 | HEART RATE: 80 BPM | DIASTOLIC BLOOD PRESSURE: 70 MMHG

## 2023-10-03 DIAGNOSIS — R73.9 HYPERGLYCEMIA: ICD-10-CM

## 2023-10-03 DIAGNOSIS — I10 PRIMARY HYPERTENSION: Primary | ICD-10-CM

## 2023-10-03 DIAGNOSIS — E79.0 HYPERURICEMIA: ICD-10-CM

## 2023-10-03 DIAGNOSIS — I48.91 ATRIAL FIBRILLATION, UNSPECIFIED TYPE (HCC): ICD-10-CM

## 2023-10-03 DIAGNOSIS — L21.9 SEBORRHEIC DERMATITIS: ICD-10-CM

## 2023-10-03 DIAGNOSIS — Z12.11 COLON CANCER SCREENING: ICD-10-CM

## 2023-10-03 DIAGNOSIS — L21.9 SEBORRHEIC DERMATITIS OF SCALP: Primary | ICD-10-CM

## 2023-10-03 DIAGNOSIS — Z11.59 NEED FOR HEPATITIS C SCREENING TEST: ICD-10-CM

## 2023-10-03 DIAGNOSIS — Z23 NEED FOR VACCINATION WITH 20-POLYVALENT PNEUMOCOCCAL CONJUGATE VACCINE: ICD-10-CM

## 2023-10-03 DIAGNOSIS — Z12.5 PROSTATE CANCER SCREENING: ICD-10-CM

## 2023-10-03 LAB — HBA1C MFR BLD: 5.6 %

## 2023-10-03 PROCEDURE — 90677 PCV20 VACCINE IM: CPT | Performed by: FAMILY MEDICINE

## 2023-10-03 PROCEDURE — 1123F ACP DISCUSS/DSCN MKR DOCD: CPT | Performed by: FAMILY MEDICINE

## 2023-10-03 PROCEDURE — 83036 HEMOGLOBIN GLYCOSYLATED A1C: CPT | Performed by: FAMILY MEDICINE

## 2023-10-03 PROCEDURE — G0008 ADMIN INFLUENZA VIRUS VAC: HCPCS | Performed by: FAMILY MEDICINE

## 2023-10-03 PROCEDURE — 3078F DIAST BP <80 MM HG: CPT | Performed by: FAMILY MEDICINE

## 2023-10-03 PROCEDURE — 90694 VACC AIIV4 NO PRSRV 0.5ML IM: CPT | Performed by: FAMILY MEDICINE

## 2023-10-03 PROCEDURE — 99214 OFFICE O/P EST MOD 30 MIN: CPT | Performed by: FAMILY MEDICINE

## 2023-10-03 PROCEDURE — G0009 ADMIN PNEUMOCOCCAL VACCINE: HCPCS | Performed by: FAMILY MEDICINE

## 2023-10-03 PROCEDURE — 3075F SYST BP GE 130 - 139MM HG: CPT | Performed by: FAMILY MEDICINE

## 2023-10-03 RX ORDER — CLOTRIMAZOLE 1 %
CREAM (GRAM) TOPICAL
Qty: 45 G | Refills: 1 | Status: SHIPPED | OUTPATIENT
Start: 2023-10-03 | End: 2023-10-10

## 2023-10-03 RX ORDER — LOSARTAN POTASSIUM AND HYDROCHLOROTHIAZIDE 12.5; 5 MG/1; MG/1
1 TABLET ORAL DAILY
Qty: 90 TABLET | Refills: 1 | Status: SHIPPED | OUTPATIENT
Start: 2023-10-03 | End: 2024-03-31

## 2023-10-03 ASSESSMENT — ENCOUNTER SYMPTOMS
EYE ITCHING: 0
ABDOMINAL DISTENTION: 0
SORE THROAT: 0
WHEEZING: 0
NAUSEA: 0
COLOR CHANGE: 0
FACIAL SWELLING: 0
CONSTIPATION: 0
BLOOD IN STOOL: 0
SINUS PRESSURE: 0
ABDOMINAL PAIN: 0
PHOTOPHOBIA: 0
DIARRHEA: 0
COUGH: 0
VOMITING: 0
SHORTNESS OF BREATH: 0
RHINORRHEA: 0
EYE PAIN: 0
EYE DISCHARGE: 0

## 2023-10-03 NOTE — PATIENT INSTRUCTIONS
your provider if you feel dizzy or have vision changes or ringing in the ears. As with any medicine, there is a very remote chance of a vaccine causing a severe allergic reaction, other serious injury, or death. 5. What if there is a serious problem? An allergic reaction could occur after the vaccinated person leaves the clinic. If you see signs of a severe allergic reaction (hives, swelling of the face and throat, difficulty breathing, a fast heartbeat, dizziness, or weakness), call 9-1-1 and get the person to the nearest hospital.    For other signs that concern you, call your health care provider. Adverse reactions should be reported to the Vaccine Adverse Event Reporting System (VAERS). Your health care provider will usually file this report, or you can do it yourself. Visit the VAERS website at www.vaers. hhs.gov or call 9-252.956.1232. VAERS is only for reporting reactions, and VAERS staff members do not give medical advice. 6. The National Vaccine Injury Compensation Program    The [facility] Vaccine Injury Compensation Program (VICP) is a federal program that was created to compensate people who may have been injured by certain vaccines. Claims regarding alleged injury or death due to vaccination have a time limit for filing, which may be as short as two years. Visit the VICP website at www.hrsa.gov/vaccinecompensation or call 7-939.488.6805 to learn about the program and about filing a claim. 7. How can I learn more?    o Ask your health care provider. o Call your local or state health department. o Visit the website of the Food and Drug Administration (FDA) for vaccine package inserts and additional information at www.fda.gov/vaccines-blood-biologics/vaccines. o Contact the Centers for Disease Control and Prevention (CDC):  - Call 8-616.481.8235 (1-800-CDC-INFO) or  - Visit CDC's influenza website at www.cdc.gov/flu.     Vaccine Information Statement   Inactivated Influenza Vaccine

## 2023-10-03 NOTE — PROGRESS NOTES
mmol/L Final     Potassium reflex Magnesium   Date Value Ref Range Status   12/11/2022 4.2 3.5 - 5.0 mmol/L Final     Chloride   Date Value Ref Range Status   06/28/2023 103 98 - 107 mmol/L Final     CO2   Date Value Ref Range Status   06/28/2023 28 22 - 29 mmol/L Final     Anion Gap   Date Value Ref Range Status   06/28/2023 8 7 - 16 mmol/L Final     Glucose   Date Value Ref Range Status   06/28/2023 130 (H) 74 - 99 mg/dL Final     BUN   Date Value Ref Range Status   06/28/2023 16 6 - 23 mg/dL Final     Creatinine   Date Value Ref Range Status   06/28/2023 1.2 0.7 - 1.2 mg/dL Final     Est, Glom Filt Rate   Date Value Ref Range Status   06/28/2023 >60 >=60 mL/min/1.73 Final     Comment:     Pediatric calculator link  Hugo.at. org/professionals/kdoqi/gfr_calculatorped  Effective Oct 3, 2022  These results are not intended for use in patients  <25years of age. eGFR results are calculated without  a race factor using the 2021 CKD-EPI equation. Careful  clinical correlation is recommended, particularly when  comparing to results calculated using previous equations. The CKD-EPI equation is less accurate in patients with  extremes of muscle mass, extra-renal metabolism of  creatinine, excessive creatinine ingestion, or following  therapy that affects renal tubular secretion.        GFR    Date Value Ref Range Status   01/25/2022 >60  Final     Calcium   Date Value Ref Range Status   06/28/2023 8.5 (L) 8.6 - 10.2 mg/dL Final     Total Protein   Date Value Ref Range Status   06/28/2023 6.5 6.4 - 8.3 g/dL Final     Albumin   Date Value Ref Range Status   06/28/2023 3.9 3.5 - 5.2 g/dL Final     Total Bilirubin   Date Value Ref Range Status   06/28/2023 0.3 0.0 - 1.2 mg/dL Final     Alkaline Phosphatase   Date Value Ref Range Status   06/28/2023 89 40 - 129 U/L Final     ALT   Date Value Ref Range Status   06/28/2023 12 0 - 40 U/L Final     AST   Date Value Ref Range Status   06/28/2023 17 0 - 39

## 2023-10-05 ENCOUNTER — TELEPHONE (OUTPATIENT)
Dept: PRIMARY CARE CLINIC | Age: 73
End: 2023-10-05

## 2023-10-05 NOTE — TELEPHONE ENCOUNTER
PC to patient to inform we put the request for authorization through his insurance for him to see Dr. Davian Tellez. Informed him that Dr. Davian Tellez is out of network with his new insurance ([employer]), and that we can wait to see if his insurance will state it is medically necessary for him to see Dr. Davian Tellez if he would like.      Patient states he will check with his wife and let us know who he would like to see    (Let patient know that both Dr. Kevin Buitrago and Dr. Criselda Manuel are in network)

## 2023-10-10 ENCOUNTER — NURSE ONLY (OUTPATIENT)
Dept: PRIMARY CARE CLINIC | Age: 73
End: 2023-10-10
Payer: COMMERCIAL

## 2023-10-10 DIAGNOSIS — I48.91 ATRIAL FIBRILLATION, UNSPECIFIED TYPE (HCC): ICD-10-CM

## 2023-10-10 DIAGNOSIS — Z11.59 NEED FOR HEPATITIS C SCREENING TEST: ICD-10-CM

## 2023-10-10 DIAGNOSIS — I10 PRIMARY HYPERTENSION: ICD-10-CM

## 2023-10-10 DIAGNOSIS — Z12.5 PROSTATE CANCER SCREENING: Primary | ICD-10-CM

## 2023-10-10 DIAGNOSIS — E79.0 HYPERURICEMIA: ICD-10-CM

## 2023-10-10 DIAGNOSIS — I48.91 NEW ONSET ATRIAL FIBRILLATION (HCC): ICD-10-CM

## 2023-10-10 DIAGNOSIS — Z12.5 PROSTATE CANCER SCREENING: ICD-10-CM

## 2023-10-10 PROCEDURE — 36415 COLL VENOUS BLD VENIPUNCTURE: CPT | Performed by: FAMILY MEDICINE

## 2023-10-11 LAB
CHOLESTEROL: 151 MG/DL
HDLC SERPL-MCNC: 60 MG/DL
HEPATITIS C ANTIBODY: NONREACTIVE
LDL CHOLESTEROL: 84 MG/DL
PROSTATE SPECIFIC ANTIGEN: 0.95 NG/ML (ref 0–4)
TRIGL SERPL-MCNC: 36 MG/DL
TSH SERPL DL<=0.05 MIU/L-ACNC: 3.76 UIU/ML (ref 0.27–4.2)
URIC ACID: 6.8 MG/DL (ref 3.4–7)
VLDLC SERPL CALC-MCNC: 7 MG/DL

## 2023-10-25 ENCOUNTER — TELEPHONE (OUTPATIENT)
Dept: PRIMARY CARE CLINIC | Age: 73
End: 2023-10-25

## 2023-10-25 NOTE — TELEPHONE ENCOUNTER
PC to patient to follow up on referral to Gastroenterology    Dr. Dalton Burnett is out of network with his insurance, patient was to check with his wife    Patient admits that he has not followed up yet, but will get back to us on who he would like to see

## 2023-11-21 DIAGNOSIS — Z12.11 COLON CANCER SCREENING: Primary | ICD-10-CM

## 2023-11-30 ENCOUNTER — HOSPITAL ENCOUNTER (EMERGENCY)
Age: 73
Discharge: HOME | End: 2023-11-30
Payer: COMMERCIAL

## 2023-11-30 ENCOUNTER — APPOINTMENT (OUTPATIENT)
Dept: GENERAL RADIOLOGY | Age: 73
End: 2023-11-30
Payer: COMMERCIAL

## 2023-11-30 VITALS
OXYGEN SATURATION: 99 % | TEMPERATURE: 97.3 F | RESPIRATION RATE: 22 BRPM | WEIGHT: 278 LBS | DIASTOLIC BLOOD PRESSURE: 77 MMHG | SYSTOLIC BLOOD PRESSURE: 146 MMHG | HEART RATE: 98 BPM | BODY MASS INDEX: 35.69 KG/M2

## 2023-11-30 DIAGNOSIS — S89.91XA INJURY OF RIGHT KNEE, INITIAL ENCOUNTER: Primary | ICD-10-CM

## 2023-11-30 PROCEDURE — 99283 EMERGENCY DEPT VISIT LOW MDM: CPT

## 2023-11-30 PROCEDURE — 73562 X-RAY EXAM OF KNEE 3: CPT

## 2023-11-30 RX ORDER — ACETAMINOPHEN 500 MG
1000 TABLET ORAL 3 TIMES DAILY PRN
Qty: 42 TABLET | Refills: 0 | Status: SHIPPED | OUTPATIENT
Start: 2023-11-30 | End: 2023-12-07

## 2023-11-30 ASSESSMENT — PAIN - FUNCTIONAL ASSESSMENT: PAIN_FUNCTIONAL_ASSESSMENT: 0-10

## 2023-11-30 ASSESSMENT — PAIN SCALES - GENERAL: PAINLEVEL_OUTOF10: 7

## 2023-11-30 NOTE — ED PROVIDER NOTES
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 ED Course / Medical Decision Making   Medications - No data to display     Consult(s):   None    Procedure(s):   none. MDM:     Patient presents to the emergency department for right knee pain and injury. Radiographs were obtained and interpreted by the radiologist as:   XR KNEE RIGHT (3 VIEWS)   Final Result   No acute bony abnormalities. 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 He is neurovascularly intact. RICE therapy. Follow-up with Ortho. He will be given the prescriptions below, patient educated on new meds. Patient to return immediately for any worsening symptoms. Plan of Care/Counseling:  Alexandr Escudero PA-C reviewed today's visit with the patient in addition to providing specific details for the plan of care and counseling regarding the diagnosis and prognosis. Questions are answered at this time and are agreeable with the plan. Assessment      1. Injury of right knee, initial encounter      Plan   Discharged home. Patient condition is good    New Medications     New Prescriptions    ACETAMINOPHEN (TYLENOL) 500 MG TABLET    Take 2 tablets by mouth 3 times daily as needed for Pain or Fever    DICLOFENAC SODIUM (VOLTAREN) 1 % GEL    Apply 4 g topically 4 times daily     Electronically signed by Alexandr Escudero PA-C   DD: 11/30/23  **This report was transcribed using voice recognition software. Every effort was made to ensure accuracy; however, inadvertent computerized transcription errors may be present.   END OF ED PROVIDER NOTE       Alexandr Escudero PA-C  11/30/23 [id number]

## 2023-11-30 NOTE — ED NOTES
Knee immobilizer placed and fitted to right knee, home care instructions provided, no questions at this time.       Glory Alanis RN  11/30/23 [id number]

## 2023-12-01 ENCOUNTER — TELEPHONE (OUTPATIENT)
Dept: ORTHOPEDIC SURGERY | Age: 73
End: 2023-12-01

## 2023-12-01 NOTE — TELEPHONE ENCOUNTER
Pt need a ED follow up 11/30- [facility]-Injury of right knee, initial encounter.   on call. Please contact pt to schedule.

## 2024-03-09 ENCOUNTER — APPOINTMENT (OUTPATIENT)
Dept: GENERAL RADIOLOGY | Age: 74
End: 2024-03-09
Payer: COMMERCIAL

## 2024-03-09 ENCOUNTER — HOSPITAL ENCOUNTER (EMERGENCY)
Age: 74
Discharge: HOME | End: 2024-03-09
Payer: COMMERCIAL

## 2024-03-09 VITALS
TEMPERATURE: 98.2 F | OXYGEN SATURATION: 98 % | DIASTOLIC BLOOD PRESSURE: 97 MMHG | WEIGHT: 278 LBS | SYSTOLIC BLOOD PRESSURE: 191 MMHG | BODY MASS INDEX: 35.69 KG/M2 | HEART RATE: 98 BPM | RESPIRATION RATE: 16 BRPM

## 2024-03-09 DIAGNOSIS — M65.9 SYNOVITIS AND TENOSYNOVITIS: Primary | ICD-10-CM

## 2024-03-09 LAB — GLUCOSE BLD-MCNC: 99 MG/DL (ref 74–99)

## 2024-03-09 PROCEDURE — 99283 EMERGENCY DEPT VISIT LOW MDM: CPT

## 2024-03-09 PROCEDURE — 6370000000 HC RX 637 (ALT 250 FOR IP): Performed by: PHYSICIAN ASSISTANT

## 2024-03-09 PROCEDURE — 73130 X-RAY EXAM OF HAND: CPT

## 2024-03-09 PROCEDURE — 82962 GLUCOSE BLOOD TEST: CPT

## 2024-03-09 RX ORDER — HYDROCODONE BITARTRATE AND ACETAMINOPHEN 5; 325 MG/1; MG/1
1 TABLET ORAL ONCE
Status: COMPLETED | OUTPATIENT
Start: 2024-03-09 | End: 2024-03-09

## 2024-03-09 RX ADMIN — HYDROCODONE BITARTRATE AND ACETAMINOPHEN 1 TABLET: 5; 325 TABLET ORAL at 12:22

## 2024-03-09 ASSESSMENT — PAIN SCALES - GENERAL: PAINLEVEL_OUTOF10: 4

## 2024-03-09 ASSESSMENT — PAIN DESCRIPTION - LOCATION: LOCATION: FINGER (COMMENT WHICH ONE)

## 2024-03-09 ASSESSMENT — PAIN - FUNCTIONAL ASSESSMENT: PAIN_FUNCTIONAL_ASSESSMENT: 0-10

## 2024-03-09 ASSESSMENT — PAIN DESCRIPTION - ORIENTATION: ORIENTATION: LEFT

## 2024-03-09 NOTE — ED PROVIDER NOTES
Independent RYAN Visit.     [facility]  Department of Emergency Medicine   ED  Encounter Note  Admit Date/RoomTime: 3/9/2024 11:53 AM  ED Room: Internal Waiting A/IntWa*  NAME: Bebo Krause  : 1950  MRN: [id number]     Chief Complaint:  Finger Pain (Pt c/o left index finger pain and swelling that started Thursday. Pt states he slipped while trying to get off of his riding  and attempted to grab the steering wheel, injuring his finger. )    HISTORY OF PRESENT ILLNESS        Bebo Krause is a 73 y.o. male who presents to the ED with a complaint of left index finger pain.  Patient states 2 days ago he tripped getting off of his lawnmower.  Went to grab the steering well and ended up yanking his left index finger.  Presents today with left index finger swelling and pain.  Does not want anything to touch it.  He is right-hand dominant.  Patient states he is not a diabetic.  Symptoms are moderate in severity.  Worse with any movement.      ROS   Pertinent positives and negatives are stated within HPI, all other systems reviewed and are negative.    Past Medical History:  has a past medical history of Chronic back pain, Hx of blood clots, Hypertension, and Iron deficiency anemia.    Surgical History:  has a past surgical history that includes Tonsillectomy; hernia repair; Gastric bypass surgery (); back surgery (); Vena Cava Filter Placement (); Colonoscopy; Cholecystectomy; Intracapsular cataract extraction (Right, 2020); Cataract removal with implant (Left, 2021); and Intracapsular cataract extraction (Left, 3/9/2021).    Social History:  reports that he has never smoked. He has never used smokeless tobacco. He reports current alcohol use of about 1.0 standard drink of alcohol per week. He reports that he does not use drugs.    Family History: family history is not on file.     Allergies: Patient has no known allergies.    PHYSICAL EXAM   Oxygen Saturation  Interpretation: Normal on room air analysis.        ED Triage Vitals [03/09/24 1140]   BP Temp Temp src Pulse Respirations SpO2 Height Weight - Scale   (!) 191/97 98.2 °F (36.8 °C) -- 98 16 98 % -- 126.1 kg (278 lb)         General:  NAD.  Alert and Oriented.  Well-appearing.  Skin:  Warm, dry.  No rashes.  Head:  Normocephalic.  Atraumatic.  Eyes:  EOMI.  Conjunctiva normal.  ENT:  Oral mucosa moist.  Airway patent.  Neck:  Supple.  Normal ROM.    Respiratory:  No respiratory distress.  No labored breathing.  Lungs clear without rales, rhonchi or wheezing.  Cardiovascular:  Regular rate.  No Murmur.  No peripheral edema.  Extremities warm and good color.  Extremities: Left index finger with classic fusiform swelling.  Pain with flexion and extension, more with extension.  Pain with tapping along the tip of the left index finger.  Remainder of the left hand is negative for swelling or pain.  2+ left radial pulse.  Back:  Normal ROM.  Nontender to palpation.  Neuro:  Alert and Oriented to person, place, time and situation.  Normal LOC.  Moves all extremities.  Speech fluent.  Psych:  Calm and Cooperative.  Normal thought process.  Normal judgement.    Lab / Imaging Results   (All laboratory and radiology results have been personally reviewed by myself)  Labs:  Results for orders placed or performed during the hospital encounter of 03/09/24   POCT Glucose   Result Value Ref Range    POC Glucose 99 74 - 99 mg/dL     Imaging:  All Radiology results interpreted by Radiologist unless otherwise noted.  XR HAND LEFT (MIN 3 VIEWS)   Final Result   1. Osteoarthritic changes involving the proximal interphalangeal joint of the   2nd digit.   2. Edema involving 2nd digit.   3. No fracture or dislocation.             ED Course / Medical Decision Making     Medications   HYDROcodone-acetaminophen (NORCO) 5-325 MG per tablet 1 tablet (1 tablet Oral Given 3/9/24 1222)        Re-examination:  3/9/24       Time:   Patient’s condition

## 2024-03-11 ENCOUNTER — OFFICE VISIT (OUTPATIENT)
Dept: PRIMARY CARE CLINIC | Age: 74
End: 2024-03-11
Payer: COMMERCIAL

## 2024-03-11 ENCOUNTER — TELEPHONE (OUTPATIENT)
Dept: PRIMARY CARE CLINIC | Age: 74
End: 2024-03-11

## 2024-03-11 VITALS
TEMPERATURE: 97.4 F | SYSTOLIC BLOOD PRESSURE: 124 MMHG | WEIGHT: 287.8 LBS | OXYGEN SATURATION: 93 % | HEART RATE: 72 BPM | BODY MASS INDEX: 36.95 KG/M2 | DIASTOLIC BLOOD PRESSURE: 68 MMHG

## 2024-03-11 DIAGNOSIS — M65.9 SYNOVITIS OF FINGER: Primary | ICD-10-CM

## 2024-03-11 PROCEDURE — 99213 OFFICE O/P EST LOW 20 MIN: CPT

## 2024-03-11 PROCEDURE — 3074F SYST BP LT 130 MM HG: CPT

## 2024-03-11 PROCEDURE — 1123F ACP DISCUSS/DSCN MKR DOCD: CPT

## 2024-03-11 PROCEDURE — 3078F DIAST BP <80 MM HG: CPT

## 2024-03-11 RX ORDER — PREDNISONE 20 MG/1
20 TABLET ORAL DAILY
Qty: 7 TABLET | Refills: 0 | Status: SHIPPED | OUTPATIENT
Start: 2024-03-11 | End: 2024-03-18

## 2024-03-11 ASSESSMENT — ENCOUNTER SYMPTOMS
SHORTNESS OF BREATH: 0
VOMITING: 0
CONSTIPATION: 0
COUGH: 0
PHOTOPHOBIA: 0
RHINORRHEA: 0
BACK PAIN: 0
DIARRHEA: 0
SORE THROAT: 0
WHEEZING: 0
ABDOMINAL PAIN: 0

## 2024-03-11 ASSESSMENT — PATIENT HEALTH QUESTIONNAIRE - PHQ9
2. FEELING DOWN, DEPRESSED OR HOPELESS: 0
1. LITTLE INTEREST OR PLEASURE IN DOING THINGS: 0
SUM OF ALL RESPONSES TO PHQ QUESTIONS 1-9: 0
SUM OF ALL RESPONSES TO PHQ QUESTIONS 1-9: 0
SUM OF ALL RESPONSES TO PHQ9 QUESTIONS 1 & 2: 0
SUM OF ALL RESPONSES TO PHQ QUESTIONS 1-9: 0
SUM OF ALL RESPONSES TO PHQ QUESTIONS 1-9: 0

## 2024-03-11 NOTE — PROGRESS NOTES
Subjective:  73 y.o. male who presents to the office today with chief complaint:  Chief Complaint   Patient presents with    ED Follow-up     3/9/2024 (3 hours)  [facility] Emergency Department  Left index finger caught on riding  when he was getting off.  Swollen with discomfort that goes into the hand.     Eloped. Received his XR results on [employer], but then no one informed him that they were waiting on ortho. Waited over an hour & a half in a room with sick people without being talked to.      Patient here for follow-up after presenting to the emergency department on 3/9/2024.  He presented with pain and swelling of left index finger.  Reports he slipped off his riding lawnmower and attempted to grab the steering wheel which she believes injured his finger.  He was given dose of Norco and an x-ray was ordered.  Unfortunately patient eloped from the emergency department and left before treatment was complete.  Ortho was consulted but never sought patient.  He obtained his x-ray results from [employer].  Finger today is in extension.  He has difficulty and pain with flexion/trying to make a fist.    Health Maintenance Due   Topic Date Due    DTaP/Tdap/Td vaccine (1 - Tdap) Never done    Colorectal Cancer Screen  Never done    Shingles vaccine (1 of 2) Never done    Respiratory Syncytial Virus (RSV) Pregnant or age 60 yrs+ (1 - 1-dose 60+ series) Never done    COVID-19 Vaccine (4 - 2023-24 season) 09/01/2023    Annual Wellness Visit (Medicare Advantage)  Never done    Depression Screen  01/31/2024     Review of Systems   Constitutional:  Negative for appetite change, chills, diaphoresis, fatigue and fever.   HENT:  Negative for congestion, hearing loss, rhinorrhea and sore throat.    Eyes:  Negative for photophobia and visual disturbance.   Respiratory:  Negative for cough, shortness of breath and wheezing.    Cardiovascular:  Negative for chest pain and palpitations.

## 2024-03-11 NOTE — TELEPHONE ENCOUNTER
----- Message from London Stephens PA-C sent at 3/11/2024  2:26 PM EDT -----  Can you please remind pt he should not be taking any NSAIDs for his finger since he is on eliquis

## 2024-03-15 ENCOUNTER — TELEPHONE (OUTPATIENT)
Dept: PRIMARY CARE CLINIC | Age: 74
End: 2024-03-15

## 2024-03-15 NOTE — TELEPHONE ENCOUNTER
PC from pt, was told to call in today with update on finger and medication.    Stated he is noticing no difference, maybe a little worse than before.    Please advise.

## 2024-03-15 NOTE — TELEPHONE ENCOUNTER
If his finger is worsening I would go to the ORTHO walk in clinic which would be the fastest way to see ortho. There is a [facility] and [facility] location. I can also do an outpatient referral but this might take a little bit longer depending on how bad the finger is getting. If it is red or swollen, painful, etc he should go to the ortho walk in.       Pt notified and will go to Ortho walk in.  Stated his finger is not any worse, just the same as it was on Monday. (No redness or swelling)

## 2024-04-03 DIAGNOSIS — I10 PRIMARY HYPERTENSION: ICD-10-CM

## 2024-04-03 RX ORDER — LOSARTAN POTASSIUM AND HYDROCHLOROTHIAZIDE 12.5; 5 MG/1; MG/1
1 TABLET ORAL DAILY
Qty: 90 TABLET | Refills: 1 | Status: SHIPPED | OUTPATIENT
Start: 2024-04-03 | End: 2024-09-30

## 2024-04-05 ENCOUNTER — TELEPHONE (OUTPATIENT)
Dept: PRIMARY CARE CLINIC | Age: 74
End: 2024-04-05

## 2024-04-05 NOTE — TELEPHONE ENCOUNTER
Sent the following [employer] message to patient after receiving notice that patient had canceled but not rescheduled his Medicare AWV    Currently, no future appts with Dr. Cb Lagunas,    Thank you for the notification of needing to cancel your appointment.  We ask that you please reschedule your Medicare Annual Well Visit for a time that is more convenient for you when for when you return to town.  It is usually best to schedule in advance if you prefer morning appointments.    You can reschedule this appointment directly through your [employer] account.  If you have any difficulties, please, contact the office at [phone].        Sincerely,  [facility] Primary [facility]  ===View-only below this line===      ----- Message -----       From:Bebo Krause       Sent:4/5/2024  9:28 AM EDT         To:[facility] PRIMARY [facility]    Subject:Appointment canceled    Appointment canceled for Bebo ABBIE Jimi ([id number])  Visit Type: MEDICARE ANNUAL WELL VISIT  Date        Time      Length    Provider                  Department  4/10/2024    9:30 AM  30 mins.  Dr. Jacek Vivar,     [facility]    Reason for Cancellation: Other    Patient Comments: I will be out of town.

## 2024-04-06 SDOH — HEALTH STABILITY: PHYSICAL HEALTH: ON AVERAGE, HOW MANY MINUTES DO YOU ENGAGE IN EXERCISE AT THIS LEVEL?: 0 MIN

## 2024-04-06 SDOH — HEALTH STABILITY: PHYSICAL HEALTH: ON AVERAGE, HOW MANY DAYS PER WEEK DO YOU ENGAGE IN MODERATE TO STRENUOUS EXERCISE (LIKE A BRISK WALK)?: 0 DAYS

## 2024-04-09 ENCOUNTER — OFFICE VISIT (OUTPATIENT)
Dept: ORTHOPEDIC SURGERY | Age: 74
End: 2024-04-09
Payer: COMMERCIAL

## 2024-04-09 VITALS — WEIGHT: 287 LBS | HEIGHT: 74 IN | BODY MASS INDEX: 36.83 KG/M2 | TEMPERATURE: 98 F

## 2024-04-09 DIAGNOSIS — S66.812A FLEXOR TENDON RUPTURE OF HAND, LEFT, INITIAL ENCOUNTER: Primary | ICD-10-CM

## 2024-04-09 PROCEDURE — 99203 OFFICE O/P NEW LOW 30 MIN: CPT | Performed by: ORTHOPAEDIC SURGERY

## 2024-04-09 PROCEDURE — 1123F ACP DISCUSS/DSCN MKR DOCD: CPT | Performed by: ORTHOPAEDIC SURGERY

## 2024-04-09 NOTE — PROGRESS NOTES
distributions.    Hand exam:  The skin overlying the hand is  intact.  There is not evidence of scar, lesion, laceration, or abrasion.  The motion in the small joints of the hand are intact with no stiffness or deformity.  The ROM in the MCP flexion wnl/ extension wnl , PIP flexion wnl/ extension wnl, DIP flexion dec/ extension wnl.  There is not rotational deformity.    There is no masses or adenopathy in bilateral upper extremities.  Radial pulses are 2+ and symmetric bilaterally.  Capillary refill is intact and < 2 seconds.  Motor strength is 5/5 with flexion and extension of the small finger joints. 0/5 strength of flexor digitorum profundus index finger.     Right:  Phallens sign negative, Tinnells sign negative, Median nerve compression test negative ,  Finklesteins negative, CMC Grind test negative, Piano Key Test negative.     Left:  Phallens sign negative, Tinnells sign negative, Median nerve compression test negative ,  Finklesteins negative, CMC Grind test negative, Piano Key Test negative.    Xrays: 1. Osteoarthritic changes involving the proximal interphalangeal joint of the  2nd digit.  2. Edema involving 2nd digit.  3. No fracture or dislocation.       Radiographic findings reviewed with patient    Impression:   Encounter Diagnosis   Name Primary?    Flexor tendon rupture of hand, left, initial encounter Yes       Plan: Natural history and expected course discussed. Questions answered.  Educational materials distributed.  Rest, ice, compression, and elevation (RICE) therapy.  Reduction in offending activity discussed.  OTC analgesics as needed.    Discussed treatment options and recommend referral to hand surgeon. Patient declined at this time and will let us know if he changes his mind.

## 2024-10-17 DIAGNOSIS — I10 PRIMARY HYPERTENSION: ICD-10-CM

## 2024-10-17 RX ORDER — LOSARTAN POTASSIUM AND HYDROCHLOROTHIAZIDE 12.5; 5 MG/1; MG/1
1 TABLET ORAL DAILY
Qty: 90 TABLET | Refills: 1 | Status: SHIPPED | OUTPATIENT
Start: 2024-10-17 | End: 2025-04-15

## 2024-10-17 NOTE — TELEPHONE ENCOUNTER
Requested Prescriptions     Pending Prescriptions Disp Refills    losartan-hydroCHLOROthiazide (HYZAAR) 50-12.5 MG per tablet 90 tablet 1     Sig: Take 1 tablet by mouth daily       Next appt is Visit date not found  Last appt was 3/11/2024

## 2024-12-08 SDOH — ECONOMIC STABILITY: INCOME INSECURITY: HOW HARD IS IT FOR YOU TO PAY FOR THE VERY BASICS LIKE FOOD, HOUSING, MEDICAL CARE, AND HEATING?: NOT HARD AT ALL

## 2024-12-08 SDOH — ECONOMIC STABILITY: FOOD INSECURITY: WITHIN THE PAST 12 MONTHS, THE FOOD YOU BOUGHT JUST DIDN'T LAST AND YOU DIDN'T HAVE MONEY TO GET MORE.: NEVER TRUE

## 2024-12-08 SDOH — ECONOMIC STABILITY: FOOD INSECURITY: WITHIN THE PAST 12 MONTHS, YOU WORRIED THAT YOUR FOOD WOULD RUN OUT BEFORE YOU GOT MONEY TO BUY MORE.: NEVER TRUE

## 2024-12-09 ENCOUNTER — OFFICE VISIT (OUTPATIENT)
Dept: PRIMARY CARE CLINIC | Age: 74
End: 2024-12-09

## 2024-12-09 VITALS
BODY MASS INDEX: 37.86 KG/M2 | TEMPERATURE: 98.2 F | OXYGEN SATURATION: 98 % | DIASTOLIC BLOOD PRESSURE: 82 MMHG | SYSTOLIC BLOOD PRESSURE: 128 MMHG | HEIGHT: 74 IN | WEIGHT: 295 LBS | HEART RATE: 74 BPM

## 2024-12-09 DIAGNOSIS — R73.03 PRE-DIABETES: ICD-10-CM

## 2024-12-09 DIAGNOSIS — I48.0 PAROXYSMAL ATRIAL FIBRILLATION (HCC): ICD-10-CM

## 2024-12-09 DIAGNOSIS — R42 DISEQUILIBRIUM: ICD-10-CM

## 2024-12-09 DIAGNOSIS — Z12.5 PROSTATE CANCER SCREENING: ICD-10-CM

## 2024-12-09 DIAGNOSIS — E79.0 HYPERURICEMIA: ICD-10-CM

## 2024-12-09 DIAGNOSIS — I10 PRIMARY HYPERTENSION: Primary | ICD-10-CM

## 2024-12-09 LAB — HBA1C MFR BLD: 5.5 %

## 2024-12-09 RX ORDER — HYDROCORTISONE AND ACETIC ACID 20.75; 10.375 MG/ML; MG/ML
4 SOLUTION AURICULAR (OTIC) 2 TIMES DAILY
Qty: 30 ML | Refills: 2 | Status: SHIPPED
Start: 2024-12-09 | End: 2024-12-09

## 2024-12-09 RX ORDER — HYDROCORTISONE AND ACETIC ACID 20.75; 10.375 MG/ML; MG/ML
4 SOLUTION AURICULAR (OTIC) 4 TIMES DAILY PRN
Qty: 30 ML | Refills: 2 | Status: SHIPPED | OUTPATIENT
Start: 2024-12-09 | End: 2024-12-19

## 2024-12-09 ASSESSMENT — ENCOUNTER SYMPTOMS
PHOTOPHOBIA: 0
RHINORRHEA: 0
DIARRHEA: 0
FACIAL SWELLING: 0
EYE PAIN: 0
ABDOMINAL DISTENTION: 0
VOMITING: 0
COUGH: 0
WHEEZING: 0
COLOR CHANGE: 0
SHORTNESS OF BREATH: 0
SORE THROAT: 0
EYE ITCHING: 0
EYE DISCHARGE: 0
SINUS PRESSURE: 0
BLOOD IN STOOL: 0
NAUSEA: 0
ABDOMINAL PAIN: 0
CONSTIPATION: 0

## 2024-12-09 NOTE — PROGRESS NOTES
difficulty sleeping.  He falls asleep easily but awakens early morning and has difficulty going back to sleep.  He states his balance has been \"off\" over the past 9 months.  Is not sure if this is related to his ears.  He does admit to numbness and tingling in his legs.  He also has arthritic knees which seem to affect his balance as well.  He denies vertigo but does admit to a pulsion feeling if he is standing still bending forward.  He is requesting a requisition for labs.  He does have an appointment here next month.        Review of Systems   Constitutional:  Negative for chills, fatigue and fever.   HENT:  Negative for congestion, ear discharge, ear pain, facial swelling, hearing loss, nosebleeds, rhinorrhea, sinus pressure and sore throat.    Eyes:  Negative for photophobia, pain, discharge, itching and visual disturbance.   Respiratory:  Negative for cough, shortness of breath and wheezing.    Cardiovascular:  Negative for chest pain, palpitations and leg swelling.   Gastrointestinal:  Negative for abdominal distention, abdominal pain, blood in stool, constipation, diarrhea, nausea and vomiting.   Endocrine: Negative for polydipsia, polyphagia and polyuria.   Genitourinary:  Negative for difficulty urinating, dysuria, frequency, hematuria and urgency.   Musculoskeletal:  Positive for arthralgias (Bilateral knees) and gait problem (cane). Negative for joint swelling and myalgias.   Skin:  Positive for rash (Scalp for years.). Negative for color change.   Allergic/Immunologic: Negative for environmental allergies and food allergies.   Neurological:  Negative for dizziness, seizures, syncope, weakness, numbness and headaches.   Psychiatric/Behavioral:  Negative for confusion, hallucinations and suicidal ideas. The patient is not nervous/anxious.           Objective   /82 (Position: Sitting)   Pulse 74   Temp 98.2 °F (36.8 °C) (Temporal)   Ht 1.88 m (6' 2\")   Wt 133.8 kg (295 lb)   SpO2 98%   BMI

## 2024-12-09 NOTE — PATIENT INSTRUCTIONS
after the vaccinated person leaves the clinic. If you see signs of a severe allergic reaction (hives, swelling of the face and throat, difficulty breathing, a fast heartbeat, dizziness, or weakness), call 9-1-1 and get the person to the nearest hospital.    For other signs that concern you, call your health care provider.    Adverse reactions should be reported to the Vaccine Adverse Event Reporting System (VAERS). Your health care provider will usually file this report, or you can do it yourself. Visit the VAERS website at www.vaers.[facility].gov or call 1-941.260.8233. VAERS is only for reporting reactions, and VAERS staff members do not give medical advice.    6. The National Vaccine Injury Compensation Program    The National Vaccine Injury Compensation Program (VICP) is a federal program that was created to compensate people who may have been injured by certain vaccines. Claims regarding alleged injury or death due to vaccination have a time limit for filing, which may be as short as two years. Visit the VICP website at www.[facility].gov/vaccinecompensation or call 1-121.658.9355 to learn about the program and about filing a claim.     7. How can I learn more?    o Ask your health care provider.   o Call your local or state health department.   o Visit the website of the Food and Drug Administration (FDA) for vaccine package inserts and additional information at www.fda.gov/vaccines-blood-biologics/vaccines.  o Contact the Centers for Disease Control and Prevention (CDC):  - Call 1-414.674.3149 (0-618-UPK-INFO) or  - Visit CDC's influenza website at www.cdc.gov/flu.    Vaccine Information Statement   Inactivated Influenza Vaccine   8/6/2021  42 U.S.C. § 300aa-26     Department of Health and Human Services  Centers for Disease Control and Prevention

## 2024-12-10 PROBLEM — I48.91 NEW ONSET ATRIAL FIBRILLATION (HCC): Status: RESOLVED | Noted: 2022-12-12 | Resolved: 2024-12-10

## 2024-12-10 PROBLEM — I82.419 ACUTE DEEP VEIN THROMBOSIS (DVT) OF FEMORAL VEIN (HCC): Status: RESOLVED | Noted: 2022-12-14 | Resolved: 2024-12-10

## 2024-12-10 LAB
ALBUMIN: 4 G/DL
ALP BLD-CCNC: 90 U/L
ALT SERPL-CCNC: 12 U/L
AST SERPL-CCNC: 21 U/L
BASOPHILS ABSOLUTE: 0.03 /ΜL
BASOPHILS RELATIVE PERCENT: 0.6 %
BILIRUB SERPL-MCNC: 0.6 MG/DL (ref 0.1–1.4)
BUN BLDV-MCNC: 22 MG/DL
CALCIUM SERPL-MCNC: 8.6 MG/DL
CHLORIDE BLD-SCNC: 105 MMOL/L
CHOLESTEROL, TOTAL: 167 MG/DL
CHOLESTEROL/HDL RATIO: 2.93
CO2: 27 MMOL/L
CREAT SERPL-MCNC: 1.41 MG/DL
EOSINOPHILS ABSOLUTE: 0.25 /ΜL
EOSINOPHILS RELATIVE PERCENT: 4.8 %
GLUCOSE FASTING: 92 MG/DL
HCT VFR BLD CALC: 40.1 % (ref 41–53)
HDLC SERPL-MCNC: 57 MG/DL (ref 35–70)
HEMOGLOBIN: 13 G/DL (ref 13.5–17.5)
LDL CHOLESTEROL: NORMAL
LYMPHOCYTES ABSOLUTE: 1.05 /ΜL
LYMPHOCYTES RELATIVE PERCENT: 20.3 %
MCH RBC QN AUTO: 32.3 PG
MCHC RBC AUTO-ENTMCNC: 32.4 G/DL
MCV RBC AUTO: 99.8 FL
MONOCYTES ABSOLUTE: 0.57 /ΜL
MONOCYTES RELATIVE PERCENT: 11 %
NEUTROPHILS ABSOLUTE: 3.25 /ΜL
NEUTROPHILS RELATIVE PERCENT: 63.1 %
NONHDLC SERPL-MCNC: 110 MG/DL
PDW BLD-RTO: 14 %
PLATELET # BLD: 301 K/ΜL
PMV BLD AUTO: 8.7 FL
POTASSIUM SERPL-SCNC: 4.1 MMOL/L
PROSTATE SPECIFIC ANTIGEN: 0.94 NG/ML
RBC # BLD: 4.02 10^6/ΜL
SODIUM BLD-SCNC: 142 MMOL/L
T4 FREE: 1.4
TOTAL PROTEIN: 6.6 G/DL (ref 6.4–8.2)
TRIGL SERPL-MCNC: 48 MG/DL
TSH SERPL DL<=0.05 MIU/L-ACNC: 3.2 UIU/ML
URIC ACID: 7.4
VLDLC SERPL CALC-MCNC: 10 MG/DL
WBC # BLD: 5.16 10^3/ML

## 2024-12-17 PROBLEM — I48.0 PAROXYSMAL ATRIAL FIBRILLATION (HCC): Status: RESOLVED | Noted: 2022-12-12 | Resolved: 2024-12-10

## 2024-12-17 PROBLEM — H26.9 LEFT CATARACT: Status: RESOLVED | Noted: 2021-03-09 | Resolved: 2024-12-17

## 2024-12-18 ENCOUNTER — TELEPHONE (OUTPATIENT)
Dept: PRIMARY CARE CLINIC | Age: 74
End: 2024-12-18

## 2024-12-18 NOTE — TELEPHONE ENCOUNTER
----- Message from Dr. Jacek Vivar DO sent at 12/17/2024  4:43 PM EST -----  Labs reviewed.  There was evidence of some very mild renal insufficiency.  Encourage fluids.  Avoid NSAIDs.  Cholesterol was 167 with a triglyceride of 46.  PSA was normal.  Thyroid function was normal.  Uric acid was normal at 7.4.  Remainder of the labs were otherwise unremarkable.  Recheck fasting labs in 6 months.

## 2024-12-23 LAB — FECAL BLOOD IMMUNOCHEMICAL TEST: NEGATIVE

## 2024-12-26 DIAGNOSIS — I10 PRIMARY HYPERTENSION: ICD-10-CM

## 2024-12-26 DIAGNOSIS — E79.0 HYPERURICEMIA: ICD-10-CM

## 2024-12-26 DIAGNOSIS — R42 DISEQUILIBRIUM: ICD-10-CM

## 2024-12-26 DIAGNOSIS — Z12.5 PROSTATE CANCER SCREENING: ICD-10-CM

## 2024-12-26 DIAGNOSIS — I48.0 PAROXYSMAL ATRIAL FIBRILLATION (HCC): ICD-10-CM

## 2025-03-21 SDOH — HEALTH STABILITY: PHYSICAL HEALTH: ON AVERAGE, HOW MANY DAYS PER WEEK DO YOU ENGAGE IN MODERATE TO STRENUOUS EXERCISE (LIKE A BRISK WALK)?: 1 DAY

## 2025-03-21 SDOH — HEALTH STABILITY: PHYSICAL HEALTH: ON AVERAGE, HOW MANY MINUTES DO YOU ENGAGE IN EXERCISE AT THIS LEVEL?: 10 MIN

## 2025-03-21 ASSESSMENT — PATIENT HEALTH QUESTIONNAIRE - PHQ9
SUM OF ALL RESPONSES TO PHQ QUESTIONS 1-9: 2
2. FEELING DOWN, DEPRESSED OR HOPELESS: SEVERAL DAYS
SUM OF ALL RESPONSES TO PHQ QUESTIONS 1-9: 2
1. LITTLE INTEREST OR PLEASURE IN DOING THINGS: SEVERAL DAYS

## 2025-03-21 ASSESSMENT — LIFESTYLE VARIABLES
HOW OFTEN DO YOU HAVE SIX OR MORE DRINKS ON ONE OCCASION: 1
HOW OFTEN DO YOU HAVE A DRINK CONTAINING ALCOHOL: 2-4 TIMES A MONTH
HOW OFTEN DO YOU HAVE A DRINK CONTAINING ALCOHOL: 3
HOW MANY STANDARD DRINKS CONTAINING ALCOHOL DO YOU HAVE ON A TYPICAL DAY: 1
HOW MANY STANDARD DRINKS CONTAINING ALCOHOL DO YOU HAVE ON A TYPICAL DAY: 1 OR 2

## 2025-03-22 SDOH — ECONOMIC STABILITY: TRANSPORTATION INSECURITY
IN THE PAST 12 MONTHS, HAS THE LACK OF TRANSPORTATION KEPT YOU FROM MEDICAL APPOINTMENTS OR FROM GETTING MEDICATIONS?: NO

## 2025-03-22 SDOH — ECONOMIC STABILITY: FOOD INSECURITY: WITHIN THE PAST 12 MONTHS, YOU WORRIED THAT YOUR FOOD WOULD RUN OUT BEFORE YOU GOT MONEY TO BUY MORE.: NEVER TRUE

## 2025-03-22 SDOH — ECONOMIC STABILITY: INCOME INSECURITY: IN THE LAST 12 MONTHS, WAS THERE A TIME WHEN YOU WERE NOT ABLE TO PAY THE MORTGAGE OR RENT ON TIME?: NO

## 2025-03-22 SDOH — ECONOMIC STABILITY: FOOD INSECURITY: WITHIN THE PAST 12 MONTHS, THE FOOD YOU BOUGHT JUST DIDN'T LAST AND YOU DIDN'T HAVE MONEY TO GET MORE.: NEVER TRUE

## 2025-03-25 ENCOUNTER — OFFICE VISIT (OUTPATIENT)
Dept: PRIMARY CARE CLINIC | Age: 75
End: 2025-03-25
Payer: COMMERCIAL

## 2025-03-25 VITALS
SYSTOLIC BLOOD PRESSURE: 138 MMHG | TEMPERATURE: 97.9 F | DIASTOLIC BLOOD PRESSURE: 82 MMHG | OXYGEN SATURATION: 98 % | HEIGHT: 74 IN | HEART RATE: 74 BPM | BODY MASS INDEX: 39.66 KG/M2 | WEIGHT: 309 LBS

## 2025-03-25 DIAGNOSIS — E79.0 HYPERURICEMIA: ICD-10-CM

## 2025-03-25 DIAGNOSIS — Z00.00 INITIAL MEDICARE ANNUAL WELLNESS VISIT: Primary | ICD-10-CM

## 2025-03-25 DIAGNOSIS — I48.0 PAROXYSMAL ATRIAL FIBRILLATION (HCC): ICD-10-CM

## 2025-03-25 DIAGNOSIS — I10 PRIMARY HYPERTENSION: ICD-10-CM

## 2025-03-25 DIAGNOSIS — M17.0 PRIMARY OSTEOARTHRITIS OF BOTH KNEES: ICD-10-CM

## 2025-03-25 PROCEDURE — 1159F MED LIST DOCD IN RCRD: CPT | Performed by: FAMILY MEDICINE

## 2025-03-25 PROCEDURE — 1160F RVW MEDS BY RX/DR IN RCRD: CPT | Performed by: FAMILY MEDICINE

## 2025-03-25 PROCEDURE — G0438 PPPS, INITIAL VISIT: HCPCS | Performed by: FAMILY MEDICINE

## 2025-03-25 PROCEDURE — 3079F DIAST BP 80-89 MM HG: CPT | Performed by: FAMILY MEDICINE

## 2025-03-25 PROCEDURE — 1123F ACP DISCUSS/DSCN MKR DOCD: CPT | Performed by: FAMILY MEDICINE

## 2025-03-25 PROCEDURE — 3075F SYST BP GE 130 - 139MM HG: CPT | Performed by: FAMILY MEDICINE

## 2025-03-25 NOTE — PATIENT INSTRUCTIONS
motion in joints and muscles.  Includes upper arm stretches, calf stretches, and gentle yoga.  Aim for at least twice a week, preferably after your muscles are warmed up from other activities.  It can help you function better in daily life.  Balancing.  This helps you stay coordinated and have good posture.  Includes heel-to-toe walking, tosin chi, and certain types of yoga.  Aim for at least 3 days a week.  It can reduce your risk of falling.  Even if you have a hard time meeting the recommendations, it's better to be more active than less active. All activity done in each category counts toward your weekly total. You'd be surprised how daily things like carrying groceries, keeping up with grandchildren, and taking the stairs can add up.  What keeps you from being active?  If you've had a hard time being more active, you're not alone. Maybe you remember being able to do more. Or maybe you've never thought of yourself as being active. It's frustrating when you can't do the things you want. Being more active can help. What's holding you back?  Getting started.  Have a goal, but break it into easy tasks. Small steps build into big accomplishments.  Staying motivated.  If you feel like skipping your activity, remember your goal. Maybe you want to move better and stay independent. Every activity gets you one step closer.  Not feeling your best.  Start with 5 minutes of an activity you enjoy. Prove to yourself you can do it. As you get comfortable, increase your time.  You may not be where you want to be. But you're in the process of getting there. Everyone starts somewhere.  How can you find safe ways to stay active?  Talk with your doctor about any physical challenges you're facing. Make a plan with your doctor if you have a health problem or aren't sure how to get started with activity.  If you're already active, ask your doctor if there is anything you should change to stay safe as your body and health change.  If you

## 2025-04-05 NOTE — PROGRESS NOTES
Medicare Annual Wellness Visit    Bebo Krause is here for Medicare AWV (Pt req lab req. He also would like to discuss [employer] screening.Pt complains of chucho knee pain R knee actually worse past several months. Denies trauma)    Assessment & Plan   Initial Medicare annual wellness visit  -     CBC; Future  -     Comprehensive Metabolic Panel, Fasting; Future  -     Lipid Panel; Future  Primary osteoarthritis of both knees  -     Óscar Maynard MD, [facility] [place] ([facility])  Primary hypertension  -     CBC; Future  -     Comprehensive Metabolic Panel, Fasting; Future  -     Lipid Panel; Future  Hyperuricemia  -     Uric Acid; Future  Paroxysmal atrial fibrillation (HCC)  -     TSH; Future       Return in 3 months (on 6/25/2025) for Medicare Annual Wellness Visit in 1 year.     Subjective   The following acute and/or chronic problems were also addressed today:  Complains of bilateral knee pain.  Having difficult time walking.  Longstanding history of osteoarthritis.    Patient's complete Health Risk Assessment and screening values have been reviewed and are found in Flowsheets. The following problems were reviewed today and where indicated follow up appointments were made and/or referrals ordered.    Positive Risk Factor Screenings with Interventions:    Fall Risk:  Do you feel unsteady or are you worried about falling? : (!) (Patient-Rptd) yes  2 or more falls in past year?: (Patient-Rptd) no  Fall with injury in past year?: (Patient-Rptd) no     Interventions:    Reviewed medications, home hazards, visual acuity, and co-morbidities that can increase risk for falls            General HRA Questions:  Select all that apply: (!) (Patient-Rptd) New or Increased Pain  Interventions - Pain:  Arthritic discomfort mainly bilateral knees.       Poor Eating Habits/Diet:  Do you eat balanced/healthy meals regularly?: (!) (Patient-Rptd) No  Interventions:  low carbohydrate diet, exercise for at least 150 
minutes/week    {Optional - Use if Billing for Obesity Counseling (8-15 minutes):[id number]}       Safety:  Do you always fasten your seatbelt when you are in a car?: (!) (Patient-Rptd) No  Interventions:  Patient needs to wear seatbelt when traveling in the car       {OPTIONAL- LDCT, CVD, STI Counseling Statements:0216361750}            Objective   Vitals:    03/25/25 1307   BP: 138/82   Patient Position: Sitting   Pulse: 74   Temp: 97.9 °F (36.6 °C)   TempSrc: Temporal   SpO2: 98%   Weight: (!) 140.2 kg (309 lb)   Height: 1.88 m (6' 2\")      Body mass index is 39.67 kg/m².        {OPTIONAL - GENERAL PHYSICAL EXAM (WILL AUTO-DELETE IF NOT USED):159544804}          No Known Allergies  Prior to Visit Medications    Medication Sig Taking? Authorizing Provider   losartan-hydroCHLOROthiazide (HYZAAR) 50-12.5 MG per tablet Take 1 tablet by mouth daily Yes Jacek Vivar DO   amiodarone (CORDARONE) 200 MG tablet  Yes Provider, MD Naga   apixaban (ELIQUIS) 5 MG TABS tablet 10 mg oral twice daily for 7 days them 5 mg twice daily Yes Joby Nichole DO     Physical Exam:  General Appearance: alert and oriented to person, place and time, well-developed and well-nourished, in no acute distress  Skin: warm and dry, no rash or erythema  Head: normocephalic and atraumatic  Eyes: pupils equal, round, and reactive to light, extraocular eye movements intact, conjunctivae normal and evidence of previous cataract surgery with IOL implants OU.  ENT: tympanic membrane, external ear and ear canal normal bilaterally, oropharynx clear and moist with normal mucous membranes  Neck: neck supple and non tender without mass, no thyromegaly or thyroid nodules, no cervical lymphadenopathy   Pulmonary/Chest: clear to auscultation bilaterally- no wheezes, rales or rhonchi, normal air movement, no respiratory distress  Cardiovascular: normal rate, regular rhythm, normal S1 and S2, no murmurs, rubs, clicks or gallops, distal pulses intact,

## 2025-04-12 DIAGNOSIS — I10 PRIMARY HYPERTENSION: ICD-10-CM

## 2025-04-12 RX ORDER — LOSARTAN POTASSIUM AND HYDROCHLOROTHIAZIDE 12.5; 5 MG/1; MG/1
1 TABLET ORAL DAILY
Qty: 90 TABLET | Refills: 1 | Status: SHIPPED | OUTPATIENT
Start: 2025-04-12 | End: 2025-10-09

## 2025-04-17 LAB
ALBUMIN: 3.8 G/DL
ALP BLD-CCNC: 69 U/L
ALT SERPL-CCNC: 15 U/L
AST SERPL-CCNC: 19 U/L
BASOPHILS ABSOLUTE: ABNORMAL
BASOPHILS RELATIVE PERCENT: ABNORMAL
BILIRUB SERPL-MCNC: 0.5 MG/DL (ref 0.1–1.4)
BUN BLDV-MCNC: 21 MG/DL
CALCIUM SERPL-MCNC: 8.8 MG/DL
CHLORIDE BLD-SCNC: 106 MMOL/L
CHOLESTEROL, TOTAL: 156 MG/DL
CHOLESTEROL/HDL RATIO: 2.79
CO2: 26 MMOL/L
CREAT SERPL-MCNC: 1.32 MG/DL
EOSINOPHILS ABSOLUTE: ABNORMAL
EOSINOPHILS RELATIVE PERCENT: ABNORMAL
GFR, ESTIMATED: 56
GLUCOSE FASTING: 92 MG/DL
HCT VFR BLD CALC: 37.8 % (ref 41–53)
HDLC SERPL-MCNC: 56 MG/DL (ref 35–70)
HEMOGLOBIN: 12.3 G/DL (ref 13.5–17.5)
LDL CHOLESTEROL: 93
LYMPHOCYTES ABSOLUTE: ABNORMAL
LYMPHOCYTES RELATIVE PERCENT: ABNORMAL
MCH RBC QN AUTO: 32.2 PG
MCHC RBC AUTO-ENTMCNC: 32.5 G/DL
MCV RBC AUTO: 99 FL
MONOCYTES ABSOLUTE: ABNORMAL
MONOCYTES RELATIVE PERCENT: ABNORMAL
NEUTROPHILS ABSOLUTE: ABNORMAL
NEUTROPHILS RELATIVE PERCENT: ABNORMAL
NONHDLC SERPL-MCNC: 100 MG/DL
PLATELET # BLD: 307 K/ΜL
PMV BLD AUTO: 8.8 FL
POTASSIUM SERPL-SCNC: 5.4 MMOL/L
RBC # BLD: 3.82 10^6/ΜL
SODIUM BLD-SCNC: 142 MMOL/L
TOTAL PROTEIN: 6.4 G/DL (ref 6.4–8.2)
TRIGL SERPL-MCNC: 36 MG/DL
TSH SERPL DL<=0.05 MIU/L-ACNC: 2.91 UIU/ML
URIC ACID: 6.8
VLDLC SERPL CALC-MCNC: 7 MG/DL
WBC # BLD: 4.59 10^3/ML

## 2025-04-28 ENCOUNTER — TELEPHONE (OUTPATIENT)
Dept: PRIMARY CARE CLINIC | Age: 75
End: 2025-04-28

## 2025-04-28 NOTE — TELEPHONE ENCOUNTER
----- Message from Dr. Jacek Vivar DO sent at 4/28/2025  7:38 AM EDT -----  Labs reviewed.  Cholesterol is 156 with a triglyceride of 36.  Uric acid was normal.  Thyroid function was normal.  Remainder the labs were otherwise unremarkable.  Continue current treatment.  Recheck fasting labs in 6 months.

## 2025-04-29 DIAGNOSIS — I10 PRIMARY HYPERTENSION: ICD-10-CM

## 2025-04-29 DIAGNOSIS — Z00.00 INITIAL MEDICARE ANNUAL WELLNESS VISIT: ICD-10-CM

## 2025-04-29 DIAGNOSIS — I48.0 PAROXYSMAL ATRIAL FIBRILLATION (HCC): ICD-10-CM

## 2025-04-29 DIAGNOSIS — E79.0 HYPERURICEMIA: ICD-10-CM

## 2025-08-19 ENCOUNTER — OFFICE VISIT (OUTPATIENT)
Dept: PRIMARY CARE CLINIC | Age: 75
End: 2025-08-19
Payer: COMMERCIAL

## 2025-08-19 VITALS
TEMPERATURE: 98.2 F | HEART RATE: 71 BPM | SYSTOLIC BLOOD PRESSURE: 136 MMHG | BODY MASS INDEX: 38.92 KG/M2 | DIASTOLIC BLOOD PRESSURE: 80 MMHG | OXYGEN SATURATION: 98 % | WEIGHT: 303.3 LBS | HEIGHT: 74 IN

## 2025-08-19 DIAGNOSIS — L03.311 CELLULITIS OF ABDOMINAL WALL: Primary | ICD-10-CM

## 2025-08-19 PROCEDURE — 1123F ACP DISCUSS/DSCN MKR DOCD: CPT

## 2025-08-19 PROCEDURE — 3075F SYST BP GE 130 - 139MM HG: CPT

## 2025-08-19 PROCEDURE — 99213 OFFICE O/P EST LOW 20 MIN: CPT

## 2025-08-19 PROCEDURE — 3079F DIAST BP 80-89 MM HG: CPT

## 2025-08-19 PROCEDURE — 1159F MED LIST DOCD IN RCRD: CPT

## 2025-08-19 RX ORDER — DOXYCYCLINE HYCLATE 100 MG
100 TABLET ORAL 2 TIMES DAILY
Qty: 14 TABLET | Refills: 0 | Status: SHIPPED | OUTPATIENT
Start: 2025-08-19 | End: 2025-08-26

## 2025-08-19 ASSESSMENT — ENCOUNTER SYMPTOMS
SHORTNESS OF BREATH: 0
BACK PAIN: 0
WHEEZING: 0
PHOTOPHOBIA: 0
CONSTIPATION: 0
VOMITING: 0
COUGH: 0
RHINORRHEA: 0
SORE THROAT: 0
DIARRHEA: 0
ABDOMINAL PAIN: 0

## 2025-08-21 ENCOUNTER — PATIENT MESSAGE (OUTPATIENT)
Dept: PRIMARY CARE CLINIC | Age: 75
End: 2025-08-21

## 2025-08-22 ENCOUNTER — PATIENT MESSAGE (OUTPATIENT)
Dept: PRIMARY CARE CLINIC | Age: 75
End: 2025-08-22

## 2025-08-22 DIAGNOSIS — R19.06 EPIGASTRIC MASS: Primary | ICD-10-CM

## 2025-08-26 ENCOUNTER — OFFICE VISIT (OUTPATIENT)
Dept: PRIMARY CARE CLINIC | Age: 75
End: 2025-08-26
Payer: COMMERCIAL

## 2025-08-26 VITALS
DIASTOLIC BLOOD PRESSURE: 86 MMHG | BODY MASS INDEX: 38.89 KG/M2 | HEART RATE: 84 BPM | OXYGEN SATURATION: 98 % | SYSTOLIC BLOOD PRESSURE: 132 MMHG | HEIGHT: 74 IN | WEIGHT: 303 LBS | TEMPERATURE: 98 F

## 2025-08-26 DIAGNOSIS — L03.311 CELLULITIS OF ABDOMINAL WALL: Primary | ICD-10-CM

## 2025-08-26 PROCEDURE — 99213 OFFICE O/P EST LOW 20 MIN: CPT

## 2025-08-26 PROCEDURE — 3075F SYST BP GE 130 - 139MM HG: CPT

## 2025-08-26 PROCEDURE — 1159F MED LIST DOCD IN RCRD: CPT

## 2025-08-26 PROCEDURE — 1123F ACP DISCUSS/DSCN MKR DOCD: CPT

## 2025-08-26 PROCEDURE — 3079F DIAST BP 80-89 MM HG: CPT

## 2025-08-26 RX ORDER — DOXYCYCLINE HYCLATE 100 MG
100 TABLET ORAL 2 TIMES DAILY
Qty: 6 TABLET | Refills: 0 | Status: SHIPPED | OUTPATIENT
Start: 2025-08-26 | End: 2025-08-29

## 2025-08-26 ASSESSMENT — ENCOUNTER SYMPTOMS
ABDOMINAL PAIN: 0
RHINORRHEA: 0
PHOTOPHOBIA: 0
COUGH: 0
CONSTIPATION: 0
SHORTNESS OF BREATH: 0
SORE THROAT: 0
BACK PAIN: 0
VOMITING: 0
WHEEZING: 0
DIARRHEA: 0

## 2025-09-02 ENCOUNTER — APPOINTMENT (OUTPATIENT)
Dept: CT IMAGING | Age: 75
End: 2025-09-02
Payer: COMMERCIAL

## 2025-09-02 ENCOUNTER — HOSPITAL ENCOUNTER (EMERGENCY)
Age: 75
Discharge: HOME | End: 2025-09-02
Attending: EMERGENCY MEDICINE
Payer: COMMERCIAL

## 2025-09-02 VITALS
TEMPERATURE: 98.1 F | SYSTOLIC BLOOD PRESSURE: 130 MMHG | DIASTOLIC BLOOD PRESSURE: 78 MMHG | RESPIRATION RATE: 18 BRPM | OXYGEN SATURATION: 96 % | HEART RATE: 96 BPM

## 2025-09-02 DIAGNOSIS — R33.9 URINARY RETENTION: ICD-10-CM

## 2025-09-02 DIAGNOSIS — K59.00 CONSTIPATION, UNSPECIFIED CONSTIPATION TYPE: Primary | ICD-10-CM

## 2025-09-02 LAB
ALBUMIN SERPL-MCNC: 3.9 G/DL (ref 3.5–5.2)
ALP SERPL-CCNC: 99 U/L (ref 40–129)
ALT SERPL-CCNC: 21 U/L (ref 0–50)
ANION GAP SERPL CALCULATED.3IONS-SCNC: 13 MMOL/L (ref 7–16)
AST SERPL-CCNC: 28 U/L (ref 0–50)
BASOPHILS # BLD: 0.1 K/UL (ref 0–0.2)
BASOPHILS NFR BLD: 1 % (ref 0–2)
BILIRUB DIRECT SERPL-MCNC: 0.3 MG/DL (ref 0–0.2)
BILIRUB INDIRECT SERPL-MCNC: 0.4 MG/DL (ref 0–1)
BILIRUB SERPL-MCNC: 0.7 MG/DL (ref 0–1.2)
BILIRUB UR QL STRIP: NEGATIVE
BUN SERPL-MCNC: 23 MG/DL (ref 8–23)
CALCIUM SERPL-MCNC: 8.8 MG/DL (ref 8.8–10.2)
CHLORIDE SERPL-SCNC: 104 MMOL/L (ref 98–107)
CLARITY UR: CLEAR
CO2 SERPL-SCNC: 24 MMOL/L (ref 22–29)
COLOR UR: YELLOW
CREAT SERPL-MCNC: 1.3 MG/DL (ref 0.7–1.2)
EOSINOPHIL # BLD: 0 K/UL (ref 0.05–0.5)
EOSINOPHILS RELATIVE PERCENT: 0 % (ref 0–6)
ERYTHROCYTE [DISTWIDTH] IN BLOOD BY AUTOMATED COUNT: 14.7 % (ref 11.5–15)
GFR, ESTIMATED: 55 ML/MIN/1.73M2
GLUCOSE SERPL-MCNC: 111 MG/DL (ref 74–99)
GLUCOSE UR STRIP-MCNC: NEGATIVE MG/DL
HCT VFR BLD AUTO: 37.7 % (ref 37–54)
HGB BLD-MCNC: 12.5 G/DL (ref 12.5–16.5)
HGB UR QL STRIP.AUTO: NEGATIVE
KETONES UR STRIP-MCNC: ABNORMAL MG/DL
LACTATE BLDV-SCNC: 1.8 MMOL/L (ref 0.5–2.2)
LEUKOCYTE ESTERASE UR QL STRIP: NEGATIVE
LIPASE SERPL-CCNC: 15 U/L (ref 13–60)
LYMPHOCYTES NFR BLD: 0.4 K/UL (ref 1.5–4)
LYMPHOCYTES RELATIVE PERCENT: 4 % (ref 20–42)
MAGNESIUM SERPL-MCNC: 2.2 MG/DL (ref 1.6–2.4)
MCH RBC QN AUTO: 33.2 PG (ref 26–35)
MCHC RBC AUTO-ENTMCNC: 33.2 G/DL (ref 32–34.5)
MCV RBC AUTO: 100.3 FL (ref 80–99.9)
METAMYELOCYTES ABSOLUTE COUNT: 0.1 K/UL (ref 0–0.12)
METAMYELOCYTES: 1 % (ref 0–1)
MONOCYTES NFR BLD: 0.8 K/UL (ref 0.1–0.95)
MONOCYTES NFR BLD: 7 % (ref 2–12)
NEUTROPHILS NFR BLD: 88 % (ref 43–80)
NEUTS SEG NFR BLD: 9.9 K/UL (ref 1.8–7.3)
NITRITE UR QL STRIP: NEGATIVE
PH UR STRIP: 7 [PH] (ref 5–8)
PLATELET # BLD AUTO: 313 K/UL (ref 130–450)
PMV BLD AUTO: 8.5 FL (ref 7–12)
POTASSIUM SERPL-SCNC: 3.9 MMOL/L (ref 3.5–5.1)
PROT SERPL-MCNC: 6.7 G/DL (ref 6.4–8.3)
PROT UR STRIP-MCNC: NEGATIVE MG/DL
RBC # BLD AUTO: 3.76 M/UL (ref 3.8–5.8)
RBC # BLD: ABNORMAL 10*6/UL
RBC #/AREA URNS HPF: ABNORMAL /HPF
SODIUM SERPL-SCNC: 140 MMOL/L (ref 136–145)
SP GR UR STRIP: 1.01 (ref 1–1.03)
UROBILINOGEN UR STRIP-ACNC: 1 EU/DL (ref 0–1)
WBC #/AREA URNS HPF: ABNORMAL /HPF
WBC OTHER # BLD: 11.3 K/UL (ref 4.5–11.5)

## 2025-09-02 PROCEDURE — 74177 CT ABD & PELVIS W/CONTRAST: CPT

## 2025-09-02 PROCEDURE — 99285 EMERGENCY DEPT VISIT HI MDM: CPT

## 2025-09-02 PROCEDURE — 82248 BILIRUBIN DIRECT: CPT

## 2025-09-02 PROCEDURE — 83690 ASSAY OF LIPASE: CPT

## 2025-09-02 PROCEDURE — 6360000004 HC RX CONTRAST MEDICATION: Performed by: RADIOLOGY

## 2025-09-02 PROCEDURE — 81001 URINALYSIS AUTO W/SCOPE: CPT

## 2025-09-02 PROCEDURE — 83605 ASSAY OF LACTIC ACID: CPT

## 2025-09-02 PROCEDURE — 51701 INSERT BLADDER CATHETER: CPT

## 2025-09-02 PROCEDURE — 80053 COMPREHEN METABOLIC PANEL: CPT

## 2025-09-02 PROCEDURE — 85025 COMPLETE CBC W/AUTO DIFF WBC: CPT

## 2025-09-02 PROCEDURE — 51798 US URINE CAPACITY MEASURE: CPT

## 2025-09-02 PROCEDURE — 83735 ASSAY OF MAGNESIUM: CPT

## 2025-09-02 PROCEDURE — 87086 URINE CULTURE/COLONY COUNT: CPT

## 2025-09-02 RX ORDER — IOPAMIDOL 755 MG/ML
75 INJECTION, SOLUTION INTRAVASCULAR
Status: COMPLETED | OUTPATIENT
Start: 2025-09-02 | End: 2025-09-02

## 2025-09-02 RX ORDER — SODIUM PHOSPHATE, DIBASIC AND SODIUM PHOSPHATE, MONOBASIC 7; 19 G/230ML; G/230ML
1 ENEMA RECTAL
Qty: 1 ENEMA | Refills: 0 | Status: SHIPPED | OUTPATIENT
Start: 2025-09-02

## 2025-09-02 RX ORDER — DOCUSATE SODIUM 100 MG/1
100 CAPSULE, LIQUID FILLED ORAL 2 TIMES DAILY
Qty: 30 CAPSULE | Refills: 0 | Status: SHIPPED | OUTPATIENT
Start: 2025-09-02 | End: 2025-09-17

## 2025-09-02 RX ADMIN — IOPAMIDOL 75 ML: 755 INJECTION, SOLUTION INTRAVENOUS at 16:30

## 2025-09-03 ENCOUNTER — TELEPHONE (OUTPATIENT)
Dept: PRIMARY CARE CLINIC | Age: 75
End: 2025-09-03

## 2025-09-03 LAB
MICROORGANISM SPEC CULT: NO GROWTH
SERVICE CMNT-IMP: NORMAL
SPECIMEN DESCRIPTION: NORMAL

## (undated) DEVICE — STERILE POLYISOPRENE POWDER-FREE SURGICAL GLOVES: Brand: PROTEXIS

## (undated) DEVICE — SURGICAL PROCEDURE PACK CATRCT LT EYE BASIC CUST ST JOS LF

## (undated) DEVICE — SOLUTION IV IRRIG POUR BRL 0.9% SODIUM CHL 2F7124

## (undated) DEVICE — SOLUTION SCRB 32OZ 7.5% POVIDONE IOD BTL GENTLE EFFECTIVE

## (undated) DEVICE — Device

## (undated) DEVICE — SUTURE ETHLN 10-0 L12IN NONABSORBABLE BLK CS140-8 L6.5MM 9033G

## (undated) DEVICE — SOLUTION IV IRRIG WATER 1000ML POUR BRL 2F7114

## (undated) DEVICE — ENCORE® LATEX MICRO SIZE 8.5, STERILE LATEX POWDER-FREE SURGICAL GLOVE: Brand: ENCORE

## (undated) DEVICE — 3 ML SYRINGE LUER-LOCK TIP: Brand: MONOJECT